# Patient Record
Sex: FEMALE | Race: WHITE | NOT HISPANIC OR LATINO | Employment: OTHER | URBAN - METROPOLITAN AREA
[De-identification: names, ages, dates, MRNs, and addresses within clinical notes are randomized per-mention and may not be internally consistent; named-entity substitution may affect disease eponyms.]

---

## 2017-07-07 NOTE — PROGRESS NOTES
SUBJECTIVE:                                                    Ciera Casas is a 67 year old female who presents to clinic today for the following health issues:      Musculoskeletal problem/pain      Duration: 3 weeks     Description  Location: right foot 2nd toe    Intensity:  Mild-moderate    Accompanying signs and symptoms: swelling and discoloration of toe    History  Previous similar problem: no   Previous evaluation:  none    Precipitating or alleviating factors:  Trauma or overuse: YES- had a fall  Aggravating factors include: walking    Therapies tried and outcome: rest/inactivity    Sustained injury to right second toe during fall 3 weeks ago.  Fell when she was chasing after cat and slipped on cat vomit.  Fell backwards and landed on her left elbow and right foot.  She did hit her head, no LOC.      No headache,m nausea, or vision changes after fall.    Pt was able to walk and bear weight after injury.    Left elbow pain has resolved.      immediately developed swelling and pain of right foot.  Did milan taping which actually worsened pain.  Stopped milan taping.  Pain has been improving but still hurts to walk.  No numbness or tingling.    Patient Active Problem List   Diagnosis     CARDIOVASCULAR SCREENING; LDL GOAL LESS THAN 160     Overweight     Abnormal blood sugar     Closed fracture of middle or proximal phalanx or phalanges of hand     Stiffness of joint, not elsewhere classified, hand     Pain in joint, hand     Post-proc states NEC     Depression with anxiety     Atherosclerotic plaque     Chronic right shoulder pain     Past Surgical History:   Procedure Laterality Date     APPENDECTOMY       BACK SURGERY       C NONSPECIFIC PROCEDURE  1992    laminectomy     CLOSED REDUCTION, PERCUTANEOUS PINNING FINGER, COMBINED  4/6/2012    Procedure:COMBINED CLOSED REDUCTION, PERCUTANEOUS PINNING FINGER; Right Ring Finger Proximal Fracture Closed Reduction Internal Fixation, percutaneous  pinning  Choice anesthesia; Surgeon:MILDRED DICKSON; Location:[facility]       Social History   Substance Use Topics     Smoking status: Former Smoker     Packs/day: 0.50     Years: 30.00     Types: Cigarettes     Quit date: 2/1/2009     Smokeless tobacco: Never Used     Alcohol use Yes      Comment: a couple of drinks on the weekend     Family History   Problem Relation Age of Onset     CANCER Mother      lung     Respiratory Mother      Emphysema     CANCER Father      esophagus     OSTEOPOROSIS Paternal Grandmother      Lipids Paternal Grandmother      Hypertension Maternal Aunt      Alzheimer Disease Other      fathers mother sister     Connective Tissue Disorder Other      lupus cousin     Depression Maternal Aunt      Respiratory Paternal Aunt      Asthma     Respiratory Other      neice     Respiratory Other      Asthma second cousin         Current Outpatient Prescriptions   Medication Sig Dispense Refill     fish oil-omega-3 fatty acids 1000 MG capsule Take 2 g by mouth daily       Cholecalciferol (VITAMIN D3 PO) Take by mouth daily       atorvastatin (LIPITOR) 40 MG tablet Take 1 tablet (40 mg) by mouth daily 90 tablet 1     aspirin 81 MG tablet Take 81 mg by mouth daily         ROS:  MSK, Neuro as above, otherwise negative       OBJECTIVE:                                                    /72 (BP Location: Right arm, Patient Position: Chair, Cuff Size: Adult Regular)  Pulse 78  Temp 97.8  F (36.6  C) (Tympanic)  Resp 16  Wt 180 lb (81.6 kg)  SpO2 98%  BMI 29.95 kg/m2   GENERAL APPEARANCE: healthy, alert and no distress  EYES: Eyes grossly normal to inspection and conjunctivae and sclerae normal  RESP: respirations nonlabored   ORTHO: RIGHT Foot Exam: Inspection:  swelling to 2nd digit  Tender::right 2nd MTP joint, 2nd metatarsal head  Non-tender: 2nd metatarsal, proximal, middle, or distal phalanx   Range of Motion: limited flexion of 2nd digit     PSYCH: mentation appears normal and affect  normal/bright     Xray of right foot with fracture of middle phalanx, does not extend into joint line.       ASSESSMENT/PLAN:                                                    (S92.965A) Closed fracture of phalanx of right second toe, initial encounter  (primary encounter diagnosis)  Plan: XR Foot Right G/E 3 Views, PODIATRY/FOOT &         ANKLE SURGERY REFERRAL        Placed in ortho boot with relief of pain with weight bearing.  Advised on ice and NSAID use.  Follow up with podiatry if not improving in 1 week.    (R59.321) Pain of toe of right foot  Plan: as above         See Patient Instructions    Marisela Khan, [facility]    Patient Instructions   1.  For toe fracture wear immobilizing boot when weight bearing.  Apply ice ove rthe toe 3 times a day for 15 min.  Over the counter ibuprofen as needed for pain/swelling.  Follow up with podiatry if not improving in 1 week.

## 2017-07-10 ENCOUNTER — OFFICE VISIT (OUTPATIENT)
Dept: FAMILY MEDICINE | Facility: CLINIC | Age: 67
End: 2017-07-10
Payer: COMMERCIAL

## 2017-07-10 ENCOUNTER — RADIANT APPOINTMENT (OUTPATIENT)
Dept: GENERAL RADIOLOGY | Facility: CLINIC | Age: 67
End: 2017-07-10
Attending: NURSE PRACTITIONER
Payer: COMMERCIAL

## 2017-07-10 VITALS
RESPIRATION RATE: 16 BRPM | OXYGEN SATURATION: 98 % | HEART RATE: 78 BPM | WEIGHT: 180 LBS | SYSTOLIC BLOOD PRESSURE: 130 MMHG | TEMPERATURE: 97.8 F | DIASTOLIC BLOOD PRESSURE: 72 MMHG | BODY MASS INDEX: 29.95 KG/M2

## 2017-07-10 DIAGNOSIS — M79.674 PAIN OF TOE OF RIGHT FOOT: ICD-10-CM

## 2017-07-10 DIAGNOSIS — S92.501A CLOSED FRACTURE OF PHALANX OF RIGHT SECOND TOE, INITIAL ENCOUNTER: Primary | ICD-10-CM

## 2017-07-10 PROCEDURE — 99213 OFFICE O/P EST LOW 20 MIN: CPT | Performed by: NURSE PRACTITIONER

## 2017-07-10 PROCEDURE — 73630 X-RAY EXAM OF FOOT: CPT | Mod: RT

## 2017-07-10 RX ORDER — CHLORAL HYDRATE 500 MG
2 CAPSULE ORAL DAILY PRN
COMMUNITY

## 2017-07-10 NOTE — LETTER
My Depression Action Plan  Name: Ciera Casas   Date of Birth 1950  Date: 7/10/2017    My doctor: Sowmya Tinajero   My clinic: [address] [address] [phone]          GREEN    ZONE   Good Control    What it looks like:     Things are going generally well. You have normal up s and down s. You may even feel depressed from time to time, but bad moods usually last less than a day.   What you need to do:  1. Continue to care for yourself (see self care plan)  2. Check your depression survival kit and update it as needed  3. Follow your physician s recommendations including any medication.  4. Do not stop taking medication unless you consult with your physician first.           YELLOW         ZONE Getting Worse    What it looks like:     Depression is starting to interfere with your life.     It may be hard to get out of bed; you may be starting to isolate yourself from others.    Symptoms of depression are starting to last most all day and this has happened for several days.     You may have suicidal thoughts but they are not constant.   What you need to do:     1. Call your care team, your response to treatment will improve if you keep your care team informed of your progress. Yellow periods are signs an adjustment may need to be made.     2. Continue your self-care, even if you have to fake it!    3. Talk to someone in your support network    4. Open up your depression survival kit           RED    ZONE Medical Alert - Get Help    What it looks like:     Depression is seriously interfering with your life.     You may experience these or other symptoms: You can t get out of bed most days, can t work or engage in other necessary activities, you have trouble taking care of basic hygiene, or basic responsibilities, thoughts of suicide or death that will not go away, self-injurious behavior.     What you need to do:  1. Call your care  team and request a same-day appointment. If they are not available (weekends or after hours) call your local crisis line, emergency room or 911.      Electronically signed by: Keyla Vega, July 10, 2017    Depression Self Care Plan / Survival Kit    Self-Care for Depression  Here s the deal. Your body and mind are really not as separate as most people think.  What you do and think affects how you feel and how you feel influences what you do and think. This means if you do things that people who feel good do, it will help you feel better.  Sometimes this is all it takes.  There is also a place for medication and therapy depending on how severe your depression is, so be sure to consult with your medical provider and/ or Behavioral Health Consultant if your symptoms are worsening or not improving.     In order to better manage my stress, I will:    Exercise  Get some form of exercise, every day. This will help reduce pain and release endorphins, the  feel good  chemicals in your brain. This is almost as good as taking antidepressants!  This is not the same as joining a gym and then never going! (they count on that by the way ) It can be as simple as just going for a walk or doing some gardening, anything that will get you moving.      Hygiene   Maintain good hygiene (Get out of bed in the morning, Make your bed, Brush your teeth, Take a shower, and Get dressed like you were going to work, even if you are unemployed).  If your clothes don't fit try to get ones that do.    Diet  I will strive to eat foods that are good for me, drink plenty of water, and avoid excessive sugar, caffeine, alcohol, and other mood-altering substances.  Some foods that are helpful in depression are: complex carbohydrates, B vitamins, flaxseed, fish or fish oil, fresh fruits and vegetables.    Psychotherapy  I agree to participate in Individual Therapy (if recommended).    Medication  If prescribed medications, I agree to take them.  Missing  doses can result in serious side effects.  I understand that drinking alcohol, or other illicit drug use, may cause potential side effects.  I will not stop my medication abruptly without first discussing it with my provider.    Staying Connected With Others  I will stay in touch with my friends, family members, and my primary care provider/team.    Use your imagination  Be creative.  We all have a creative side; it doesn t matter if it s oil painting, sand castles, or mud pies! This will also kick up the endorphins.    Witness Beauty  (AKA stop and smell the roses) Take a look outside, even in mid-winter. Notice colors, textures. Watch the squirrels and birds.     Service to others  Be of service to others.  There is always someone else in need.  By helping others we can  get out of ourselves  and remember the really important things.  This also provides opportunities for practicing all the other parts of the program.    Humor  Laugh and be silly!  Adjust your TV habits for less news and crime-drama and more comedy.    Control your stress  Try breathing deep, massage therapy, biofeedback, and meditation. Find time to relax each day.     My support system    Clinic Contact:  Phone number:    Contact 1:  Phone number:    Contact 2:  Phone number:    Baptism/:  Phone number:    Therapist:  Phone number:    Local crisis center:    Phone number:    Other community support:  Phone number:

## 2017-07-10 NOTE — PATIENT INSTRUCTIONS
1.  For toe fracture wear immobilizing boot when weight bearing.  Apply ice ove rthe toe 3 times a day for 15 min.  Over the counter ibuprofen as needed for pain/swelling.  Follow up with podiatry if not improving in 1 week.

## 2017-07-10 NOTE — NURSING NOTE
"Chief Complaint   Patient presents with     Musculoskeletal Problem       Initial /72 (BP Location: Right arm, Patient Position: Chair, Cuff Size: Adult Regular)  Pulse 78  Temp 97.8  F (36.6  C) (Tympanic)  Resp 16  Wt 180 lb (81.6 kg)  SpO2 98%  BMI 29.95 kg/m2 Estimated body mass index is 29.95 kg/(m^2) as calculated from the following:    Height as of 12/12/16: 5' 5\" (1.651 m).    Weight as of this encounter: 180 lb (81.6 kg).  Medication Reconciliation: complete     Keyla Vega, CMA    "

## 2017-07-10 NOTE — MR AVS SNAPSHOT
After Visit Summary   7/10/2017    Ciera Casas    MRN: [id number]           Patient Information     Date Of Birth          1950        Visit Information        Provider Department      7/10/2017 9:20 AM Marisela Khan APRN CNP [facility]        Today's Diagnoses     Pain of toe of right foot    -  1      Care Instructions    1.  For toe fracture wear immobilizing boot when weight bearing.  Apply ice ove rthe toe 3 times a day for 15 min.  Over the counter ibuprofen as needed for pain/swelling.  Follow up with podiatry if not improving in 1 week.          Follow-ups after your visit        Additional Services     PODIATRY/FOOT & ANKLE SURGERY REFERRAL       Your provider has referred you to: FMG: [facility] [phone]   [email or web address]  FMG: [facility] [phone]   [email or web address]    Please be aware that coverage of these services is subject to the terms and limitations of your health insurance plan.  Call member services at your health plan with any benefit or coverage questions.      Please bring the following to your appointment:  >>   Any x-rays, CTs or MRIs which have been performed.  Contact the facility where they were done to arrange for  prior to your scheduled appointment.    >>   List of current medications   >>   This referral request   >>   Any documents/labs given to you for this referral                  Who to contact     If you have questions or need follow up information about today's clinic visit or your schedule please contact [facility] directly at [phone].  Normal or non-critical lab and imaging results will be communicated to you by MyChart, letter or phone within 4 business days after the clinic has received the results. If you do not hear from us within 7 days, please contact the clinic through  "FireLayershart or phone. If you have a critical or abnormal lab result, we will notify you by phone as soon as possible.  Submit refill requests through [email or web address] or call your pharmacy and they will forward the refill request to us. Please allow 3 business days for your refill to be completed.          Additional Information About Your Visit        FireLayershart Information     [email or web address] lets you send messages to your doctor, view your test results, renew your prescriptions, schedule appointments and more. To sign up, go to [email or web address] . Click on \"Log in\" on the left side of the screen, which will take you to the Welcome page. Then click on \"Sign up Now\" on the right side of the page.     You will be asked to enter the access code listed below, as well as some personal information. Please follow the directions to create your username and password.     Your access code is: [email or web address]  Expires: 10/8/2017  9:58 AM     Your access code will  in 90 days. If you need help or a new code, please call your [place] clinic or [phone].        Care EveryWhere ID     This is your Care EveryWhere ID. This could be used by other organizations to access your [place] medical records  [id number]        Your Vitals Were     Pulse Temperature Respirations Pulse Oximetry BMI (Body Mass Index)       78 97.8  F (36.6  C) (Tympanic) 16 98% 29.95 kg/m2        Blood Pressure from Last 3 Encounters:   07/10/17 130/72   16 108/76   16 118/78    Weight from Last 3 Encounters:   07/10/17 180 lb (81.6 kg)   16 180 lb (81.6 kg)   16 183 lb (83 kg)              We Performed the Following     DEPRESSION ACTION PLAN (DAP)     PODIATRY/FOOT & ANKLE SURGERY REFERRAL        Primary Care Provider Office Phone # Fax #    Sowmya Tinajero PA-C [phone]       [facility]       [address] [facility] [address]        Equal Access to Services     CHARLEE TAVARES AH: Hadii " gail Livingston, waalexander germaindeborahha, qacharleneta kacesar binflor, waxairam sen crainflorentinyordan verma josi. So [facility] [phone].    ATENCIÓN: Si habla español, tiene a varner disposición servicios gratuitos de asistencia lingüística. Llame al [phone].    We comply with applicable federal civil rights laws and [place] laws. We do not discriminate on the basis of race, color, national origin, age, disability sex, sexual orientation or gender identity.            Thank you!     Thank you for choosing [facility]  for your care. Our goal is always to provide you with excellent care. Hearing back from our patients is one way we can continue to improve our services. Please take a few minutes to complete the written survey that you may receive in the mail after your visit with us. Thank you!             Your Updated Medication List - Protect others around you: Learn how to safely use, store and throw away your medicines at www.disposemymeds.org.          This list is accurate as of: 7/10/17  9:58 AM.  Always use your most recent med list.                   Brand Name Dispense Instructions for use Diagnosis    aspirin 81 MG tablet      Take 81 mg by mouth daily        atorvastatin 40 MG tablet    LIPITOR    90 tablet    Take 1 tablet (40 mg) by mouth daily    CARDIOVASCULAR SCREENING; LDL GOAL LESS THAN 160       fish oil-omega-3 fatty acids 1000 MG capsule      Take 2 g by mouth daily        VITAMIN D3 PO      Take by mouth daily

## 2017-07-11 ASSESSMENT — PATIENT HEALTH QUESTIONNAIRE - PHQ9: SUM OF ALL RESPONSES TO PHQ QUESTIONS 1-9: 2

## 2017-07-17 ENCOUNTER — OFFICE VISIT (OUTPATIENT)
Dept: PODIATRY | Facility: CLINIC | Age: 67
End: 2017-07-17
Payer: COMMERCIAL

## 2017-07-17 VITALS
HEIGHT: 65 IN | WEIGHT: 175 LBS | BODY MASS INDEX: 29.16 KG/M2 | SYSTOLIC BLOOD PRESSURE: 132 MMHG | DIASTOLIC BLOOD PRESSURE: 60 MMHG

## 2017-07-17 DIAGNOSIS — S92.514A CLOSED NONDISPLACED FRACTURE OF PROXIMAL PHALANX OF LESSER TOE OF RIGHT FOOT, INITIAL ENCOUNTER: Primary | ICD-10-CM

## 2017-07-17 PROCEDURE — 99203 OFFICE O/P NEW LOW 30 MIN: CPT | Performed by: PODIATRIST

## 2017-07-17 NOTE — MR AVS SNAPSHOT
After Visit Summary   7/17/2017    Ciera Casas    MRN: [id number]           Patient Information     Date Of Birth          1950        Visit Information        Provider Department      7/17/2017 1:20 PM Casey Munroe, [facility]        Today's Diagnoses     Closed nondisplaced fracture of proximal phalanx of lesser toe of right foot, initial encounter    -  1      Care Instructions    Follow up in 2 weeks.  Dr. Munroe's Clinic Locations    Monday Tuesday  [facility]  [address]         [address]  [place] [address]      [place] [address]  Ph:  [phone]      Ph: [phone]  Fax: [phone]      Fax: [phone]    Wednesday Thursday - Surgery Day  [facility]     Call Danielle @ [phone]  [address] [place] [address]  Ph: [phone]  Fax: [phone]    PURVIS THERAPY    Many aches and pains throughout the foot and ankle can be helped with many simple treatments. This is usually described as PRICE Therapy.      P - Protection - often times, inflammation/pain in the lower extremity is not able to improve simply because the areas involved are never allowed to rest. Every step we take can bother the problematic area. Protecting those areas is an important step in the healing process. This may involve a walking cast boot, a special insert/orthotic device, an ankle brace, or simply avoiding barefoot walking.    R - Rest - in addition to protecting the foot/ankle, resting is an important, but often times difficult, treatment option. Getting off your feet when they bother you, and specifically avoiding activities that cause pain/discomfort, are very beneficial to prevent, and treat, foot/ankle pain.      I - Ice - icing regularly can help to decrease inflammation and swelling in the foot, thus decreasing pain. Using an ice pack or a bag of  frozen veggies works very well. Ice for 20 minutes multiple times per day as needed.  Do not place the ice directly on the skin as this can cause tissue damage.    C - Compression - using a compression wrap or an ACE wrap can help to decrease swelling, which can help to decrease pain. Wearing the wraps is generally not needed at night, but they should be worn on a regular basis when you are going to be on your feet for prolonged periods as gravity tends to pull fluids down to your feet/ankles.    E - Elevation - elevating your lower extremities multiple times daily for 15-20 minutes can help to decrease swelling, which works well in decreasing pain levels.    NSAID/Tylenol - Anti-inflammatories like Aleve or ibuprofen, and/or a pain medication, such as Tylenol, can help to improve pain levels and get the issue resolved sooner rather than later. Anyone with liver issues should be careful with Tylenol, and anyone with high blood pressure or heart, stomach or kidney issues should be careful with anti-inflammatories. Please ask if you have questions about these medications, including dosage.                Follow-ups after your visit        Who to contact     If you have questions or need follow up information about today's clinic visit or your schedule please contact [facility] directly at [phone].  Normal or non-critical lab and imaging results will be communicated to you by Musiwavehart, letter or phone within 4 business days after the clinic has received the results. If you do not hear from us within 7 days, please contact the clinic through Musiwavehart or phone. If you have a critical or abnormal lab result, we will notify you by phone as soon as possible.  Submit refill requests through [employer] or call your pharmacy and they will forward the refill request to us. Please allow 3 business days for your refill to be completed.          Additional Information About Your Visit        MyChart Information  "    [employer] lets you send messages to your doctor, view your test results, renew your prescriptions, schedule appointments and more. To sign up, go to [email or web address] . Click on \"Log in\" on the left side of the screen, which will take you to the Welcome page. Then click on \"Sign up Now\" on the right side of the page.     You will be asked to enter the access code listed below, as well as some personal information. Please follow the directions to create your username and password.     Your access code is: [email or web address]  Expires: 10/8/2017  9:58 AM     Your access code will  in 90 days. If you need help or a new code, please call your [place] clinic or [phone].        Care EveryWhere ID     This is your [facility] EveryWhere ID. This could be used by other organizations to access your [place] medical records  [id number]        Your Vitals Were     Height BMI (Body Mass Index)                5' 5\" (1.651 m) 29.12 kg/m2           Blood Pressure from Last 3 Encounters:   17 132/60   07/10/17 130/72   16 108/76    Weight from Last 3 Encounters:   17 175 lb (79.4 kg)   07/10/17 180 lb (81.6 kg)   16 180 lb (81.6 kg)              Today, you had the following     No orders found for display       Primary Care Provider Office Phone # Fax #    Sowmya Tinajero PA-C [phone]       [place] [address] [facility] [address]        Equal Access to Services     [facility] CHAITANYA AH: Hadii gail Livingston, wamumtazda luchery, qaybta kaalprosper meehan. So [facility] [phone].    ATENCIÓN: Si habla español, tiene a varner disposición servicios gratuitos de asistencia lingüística. Mil al [phone].    We comply with applicable federal civil rights laws and [place] laws. We do not discriminate on the basis of race, color, national origin, age, disability sex, sexual orientation or gender " identity.            Thank you!     Thank you for choosing [facility]  for your care. Our goal is always to provide you with excellent care. Hearing back from our patients is one way we can continue to improve our services. Please take a few minutes to complete the written survey that you may receive in the mail after your visit with us. Thank you!             Your Updated Medication List - Protect others around you: Learn how to safely use, store and throw away your medicines at www.disposemymeds.org.          This list is accurate as of: 7/17/17  1:23 PM.  Always use your most recent med list.                   Brand Name Dispense Instructions for use Diagnosis    aspirin 81 MG tablet      Take 81 mg by mouth daily        atorvastatin 40 MG tablet    LIPITOR    90 tablet    Take 1 tablet (40 mg) by mouth daily    CARDIOVASCULAR SCREENING; LDL GOAL LESS THAN 160       fish oil-omega-3 fatty acids 1000 MG capsule      Take 2 g by mouth daily        VITAMIN D3 PO      Take by mouth daily
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
## 2017-07-17 NOTE — PATIENT INSTRUCTIONS
Follow up in 2 weeks.  Dr. Munroe's Clinic Locations    Monday Tuesday  [facility]  [address]         [id number]  [place] [address]      [place] [address]  Ph:  [phone]      Ph: [phone]  Fax: [phone]      Fax: [phone]    Wednesday Thursday - Surgery Day  [facility]     Call Danielle @ [phone]  [address] [place] [address] [place] [address]  Ph: [phone]  Fax: [phone]    PRICE THERAPY    Many aches and pains throughout the foot and ankle can be helped with many simple treatments. This is usually described as PRICE Therapy.      P - Protection - often times, inflammation/pain in the lower extremity is not able to improve simply because the areas involved are never allowed to rest. Every step we take can bother the problematic area. Protecting those areas is an important step in the healing process. This may involve a walking cast boot, a special insert/orthotic device, an ankle brace, or simply avoiding barefoot walking.    R - Rest - in addition to protecting the foot/ankle, resting is an important, but often times difficult, treatment option. Getting off your feet when they bother you, and specifically avoiding activities that cause pain/discomfort, are very beneficial to prevent, and treat, foot/ankle pain.      I - Ice - icing regularly can help to decrease inflammation and swelling in the foot, thus decreasing pain. Using an ice pack or a bag of frozen veggies works very well. Ice for 20 minutes multiple times per day as needed.  Do not place the ice directly on the skin as this can cause tissue damage.    C - Compression - using a compression wrap or an ACE wrap can help to decrease swelling, which can help to decrease pain. Wearing the wraps is generally not needed at night, but they should be worn on a regular basis when you are going to be on your feet for prolonged periods  as gravity tends to pull fluids down to your feet/ankles.    E - Elevation - elevating your lower extremities multiple times daily for 15-20 minutes can help to decrease swelling, which works well in decreasing pain levels.    NSAID/Tylenol - Anti-inflammatories like Aleve or ibuprofen, and/or a pain medication, such as Tylenol, can help to improve pain levels and get the issue resolved sooner rather than later. Anyone with liver issues should be careful with Tylenol, and anyone with high blood pressure or heart, stomach or kidney issues should be careful with anti-inflammatories. Please ask if you have questions about these medications, including dosage.

## 2017-07-17 NOTE — NURSING NOTE
"Chief Complaint   Patient presents with     Fracture     R 2nd toe broken- Injured over 3 weeks ago       Initial /60 (BP Location: Left arm, Patient Position: Chair, Cuff Size: Adult Large)  Ht 5' 5\" (1.651 m)  Wt 175 lb (79.4 kg)  BMI 29.12 kg/m2 Estimated body mass index is 29.12 kg/(m^2) as calculated from the following:    Height as of this encounter: 5' 5\" (1.651 m).    Weight as of this encounter: 175 lb (79.4 kg).  Medication Reconciliation: abelino Haro MA July 17, 2017 1:11 PM  "

## 2017-07-31 ENCOUNTER — RADIANT APPOINTMENT (OUTPATIENT)
Dept: GENERAL RADIOLOGY | Facility: CLINIC | Age: 67
End: 2017-07-31
Attending: PODIATRIST
Payer: COMMERCIAL

## 2017-07-31 ENCOUNTER — OFFICE VISIT (OUTPATIENT)
Dept: PODIATRY | Facility: CLINIC | Age: 67
End: 2017-07-31
Payer: COMMERCIAL

## 2017-07-31 VITALS
BODY MASS INDEX: 29.16 KG/M2 | DIASTOLIC BLOOD PRESSURE: 68 MMHG | HEIGHT: 65 IN | WEIGHT: 175 LBS | SYSTOLIC BLOOD PRESSURE: 111 MMHG

## 2017-07-31 DIAGNOSIS — S92.514D CLOSED NONDISPLACED FRACTURE OF PROXIMAL PHALANX OF LESSER TOE OF RIGHT FOOT WITH ROUTINE HEALING, SUBSEQUENT ENCOUNTER: Primary | ICD-10-CM

## 2017-07-31 PROCEDURE — 73660 X-RAY EXAM OF TOE(S): CPT | Mod: RT

## 2017-07-31 PROCEDURE — 99213 OFFICE O/P EST LOW 20 MIN: CPT | Performed by: PODIATRIST

## 2017-07-31 NOTE — PROGRESS NOTES
"PATIENT HISTORY:  Ciera Casas is a 67 year old female who presents to clinic for recheck of a R 2nd toe fx.  DOI about 5 wks ago.  Pt is wbat in a boot.  Reports minimal pain, but occasional toe and 2nd MPJ pain (plantar).  Reports painting w/o boot since last visit which made pain worse.  Denies fevers, new injury.  Nonsmoker.  Retired.       EXAM:  /68 (BP Location: Left arm, Patient Position: Chair, Cuff Size: Adult Regular)  Ht 5' 5\" (1.651 m)  Wt 175 lb (79.4 kg)  BMI 29.12 kg/m2    General appearance: Patient is alert and fully cooperative with history & exam.  No sign of distress is noted during the visit.     Dermatologic: Skin is intact to the right foot without significant lesions, rash or abrasion.  No paronychia or evidence of soft tissue infection is noted.      Vascular: DP & PT pulses are intact & regular on the right.  No significant edema or varicosities noted.  CFT and skin temperature are normal.      Neurologic: Lower extremity sensation is intact to light touch.  No evidence of weakness or contracture in the lower extremities.  No evidence of neuropathy.      Musculoskeletal: Right 2nd toe mild edema, mild pain to palpation.  Pain sub 2nd MPJ noted today as well.  Patient is ambulatory without assistive device or brace.  No gross ankle deformity noted.  No foot or ankle joint effusion is noted.     XRs of right 2nd toe reviewed with pt.  Relatively nondisplaced fx of proximal phalanx of 2nd toe with callus.  Some mild angular deformity at 2nd met head, ? old fx, DJD.      ASSESSMENT: Right 2nd toe fracture, ? capsulitis/DJD      PLAN:  Reviewed patient's chart in epic.  Discussed condition and treatment options including pros and cons.     Continue WBAT in Aircast boot (short).  RICE.   F/u in 4 wks.     Casey Munroe, ROMAINE, FACFAS        "

## 2017-07-31 NOTE — NURSING NOTE
"Chief Complaint   Patient presents with     Fracture     2 week follow up R 2nd toe Fx       Initial /68 (BP Location: Left arm, Patient Position: Chair, Cuff Size: Adult Regular)  Ht 5' 5\" (1.651 m)  Wt 175 lb (79.4 kg)  BMI 29.12 kg/m2 Estimated body mass index is 29.12 kg/(m^2) as calculated from the following:    Height as of this encounter: 5' 5\" (1.651 m).    Weight as of this encounter: 175 lb (79.4 kg).  Medication Reconciliation: abelino Haro MA July 31, 2017 1:18 PM  "

## 2017-07-31 NOTE — MR AVS SNAPSHOT
After Visit Summary   7/31/2017    Ciera Casas    MRN: [id number]           Patient Information     Date Of Birth          1950        Visit Information        Provider Department      7/31/2017 1:00 PM Casey Munroe DPM [facility]        Today's Diagnoses     Closed nondisplaced fracture of proximal phalanx of lesser toe of right foot with routine healing, subsequent encounter    -  1      Care Instructions    Follow up in 3 weeks.  Dr. Munroe's Clinic Locations    Monday Tuesday  [facility]  [address] [place] [address]         [address] [place] [address]      [place] [address]  Ph:  [phone]      Ph: [phone]  Fax: [phone]      Fax: [phone]    Wednesday Thursday - Surgery Day  [facility]     Call Danielle @ [phone]  [address] [place] [address]  Ph: [phone]  Fax: [phone]                  Follow-ups after your visit        Your next 10 appointments already scheduled     Aug 21, 2017  1:00 PM CDT   Return Visit with Casey Munroe DPM   [facility]    [address] [place] [id number] [phone]              Who to contact     If you have questions or need follow up information about today's clinic visit or your schedule please contact [facility] directly at [phone].  Normal or non-critical lab and imaging results will be communicated to you by MyChart, letter or phone within 4 business days after the clinic has received the results. If you do not hear from us within 7 days, please contact the clinic through MyChart or phone. If you have a critical or abnormal lab result, we will notify you by phone as soon as possible.  Submit refill requests through [employer] or call your pharmacy and they will forward the refill request to us. Please allow  "3 business days for your refill to be completed.          Additional Information About Your Visit        MyChart Information     "[employer] lets you send messages to your doctor, view your test results, renew your prescriptions, schedule appointments and more. To sign up, go to [email or web address] . Click on \"Log in\" on the left side of the screen, which will take you to the Welcome page. Then click on \"Sign up Now\" on the right side of the page.     You will be asked to enter the access code listed below, as well as some personal information. Please follow the directions to create your username and password.     Your access code is: [email or web address]  Expires: 10/8/2017  9:58 AM     Your access code will  in 90 days. If you need help or a new code, please call your [place] clinic or [phone].        Care EveryWhere ID     This is your [facility] EveryWhere ID. This could be used by other organizations to access your [place] medical records  [id number]        Your Vitals Were     Height BMI (Body Mass Index)                5' 5\" (1.651 m) 29.12 kg/m2           Blood Pressure from Last 3 Encounters:   17 111/68   17 132/60   07/10/17 130/72    Weight from Last 3 Encounters:   17 175 lb (79.4 kg)   17 175 lb (79.4 kg)   07/10/17 180 lb (81.6 kg)              We Performed the Following     XR Toe Right G/E 2 Views        Primary Care Provider Office Phone # Fax #    Sowmya Tinajero PA-C [phone]       [place] [id number] [facility] [address]        Equal Access to Services     SRIDHAR TAVARES : Hadjacinto [place], lore mg, gigi kaalmada gemini, prosper prater. So [facility] [phone].    ATENCIÓN: Si habla español, tiene a varner disposición servicios gratuitos de asistencia lingüística. Llame al [phone].    We comply with applicable federal civil rights laws and [place] laws. " We do not discriminate on the basis of race, color, national origin, age, disability sex, sexual orientation or gender identity.            Thank you!     Thank you for choosing [facility]  for your care. Our goal is always to provide you with excellent care. Hearing back from our patients is one way we can continue to improve our services. Please take a few minutes to complete the written survey that you may receive in the mail after your visit with us. Thank you!             Your Updated Medication List - Protect others around you: Learn how to safely use, store and throw away your medicines at www.disposemymeds.org.          This list is accurate as of: 7/31/17  1:33 PM.  Always use your most recent med list.                   Brand Name Dispense Instructions for use Diagnosis    aspirin 81 MG tablet      Take 81 mg by mouth daily        atorvastatin 40 MG tablet    LIPITOR    90 tablet    Take 1 tablet (40 mg) by mouth daily    CARDIOVASCULAR SCREENING; LDL GOAL LESS THAN 160       fish oil-omega-3 fatty acids 1000 MG capsule      Take 2 g by mouth daily        VITAMIN D3 PO      Take by mouth daily

## 2017-07-31 NOTE — LETTER
"    7/31/2017         RE: Ciera Casas  [address]  [facility] [place] [address]        Dear Colleague,    Thank you for referring your patient, Ciera Casas, to the [facility]. Please see a copy of my visit note below.    PATIENT HISTORY:  Ciera Casas is a 67 year old female who presents to clinic for recheck of a R 2nd toe fx.  DOI about 5 wks ago.  Pt is wbat in a boot.  Reports minimal pain, but occasional toe and 2nd MPJ pain (plantar).  Reports painting w/o boot since last visit which made pain worse.  Denies fevers, new injury.  Nonsmoker.  Retired.       EXAM:  /68 (BP Location: Left arm, Patient Position: Chair, Cuff Size: Adult Regular)  Ht 5' 5\" (1.651 m)  Wt 175 lb (79.4 kg)  BMI 29.12 kg/m2    General appearance: Patient is alert and fully cooperative with history & exam.  No sign of distress is noted during the visit.     Dermatologic: Skin is intact to the right foot without significant lesions, rash or abrasion.  No paronychia or evidence of soft tissue infection is noted.      Vascular: DP & PT pulses are intact & regular on the right.  No significant edema or varicosities noted.  CFT and skin temperature are normal.      Neurologic: Lower extremity sensation is intact to light touch.  No evidence of weakness or contracture in the lower extremities.  No evidence of neuropathy.      Musculoskeletal: Right 2nd toe mild edema, mild pain to palpation.   Pain sub 2nd MPJ noted today as well.  Patient is ambulatory without assistive device or brace.  No gross ankle deformity noted.  No foot or ankle joint effusion is noted.     XRs of right 2nd toe reviewed with pt.  Relatively nondisplaced fx of proximal phalanx of 2nd toe with callus.  Some mild angular deformity at 2nd met head, ? old fx, DJD.      ASSESSMENT: Right 2nd toe fracture, ? capsulitis/DJD      PLAN:  Reviewed patient's chart in epic.  Discussed condition and treatment options including pros and " cons.     Continue WBAT in Aircast boot (short).  RICE.   F/u in 4 wks.     Casey Munroe DPM, FACFAS          BMI is normal.  JUAN JOSE Haro MA July 31, 2017 1:18 PM      Again, thank you for allowing me to participate in the care of your patient.        Sincerely,        Casey Munroe DPM

## 2017-07-31 NOTE — PATIENT INSTRUCTIONS
Follow up in 3 weeks.  Dr[facility]roe[facility] Locations    Monday Tuesday  [facility]  [address]         [id number]  [place] [address]      [place] [address]  Ph:  [phone]      Ph: [phone]  Fax: [phone]      Fax: [phone]    Wednesday Thursday - Surgery Day  [facility]     Call Danielle @ [phone]  [address] [place] [address] [place] [address]  Ph: [phone]  Fax: [phone]

## 2017-08-14 DIAGNOSIS — Z13.6 CARDIOVASCULAR SCREENING; LDL GOAL LESS THAN 160: ICD-10-CM

## 2017-08-14 RX ORDER — ATORVASTATIN CALCIUM 40 MG/1
TABLET, FILM COATED ORAL
Qty: 90 TABLET | Refills: 0 | Status: SHIPPED | OUTPATIENT
Start: 2017-08-14 | End: 2017-11-22

## 2017-08-14 NOTE — TELEPHONE ENCOUNTER
Prescription approved per [facility] Refill Protocol.    Signed Prescriptions:                        Disp   Refills    atorvastatin (LIPITOR) 40 MG tablet        90 tab*0        Sig: TAKE ONE TABLET BY MOUTH EVERY DAY  Authorizing Provider: LISA IBARRA  Ordering User: EMANI CARRIZALES      Closing encounter - no further actions needed at this time    Emani Carrizales RN

## 2017-08-14 NOTE — TELEPHONE ENCOUNTER
atorvastatin (LIPITOR) 40 MG tablet     Last Written Prescription Date: 12/12/16  Last Fill Quantity: 90, # refills: 1  Last Office Visit with [facility], [facility] or [facility] prescribing provider: 7/10/17  Next 5 appointments (look out 90 days)     Aug 21, 2017  1:00 PM CDT   Return Visit with Casey Munroe DPM   [facility] ([facility])    [address] [place] [address]   [phone]                   Lab Results   Component Value Date    CHOL 147 12/13/2016     Lab Results   Component Value Date    HDL 80 12/13/2016     Lab Results   Component Value Date    LDL 56 12/13/2016     Lab Results   Component Value Date    TRIG 53 12/13/2016     Lab Results   Component Value Date    CHOLHDLRATIO 1.9 10/15/2015

## 2017-08-21 ENCOUNTER — OFFICE VISIT (OUTPATIENT)
Dept: PODIATRY | Facility: CLINIC | Age: 67
End: 2017-08-21
Payer: COMMERCIAL

## 2017-08-21 ENCOUNTER — RADIANT APPOINTMENT (OUTPATIENT)
Dept: GENERAL RADIOLOGY | Facility: CLINIC | Age: 67
End: 2017-08-21
Attending: PODIATRIST
Payer: COMMERCIAL

## 2017-08-21 VITALS
SYSTOLIC BLOOD PRESSURE: 110 MMHG | DIASTOLIC BLOOD PRESSURE: 60 MMHG | BODY MASS INDEX: 29.16 KG/M2 | WEIGHT: 175 LBS | HEIGHT: 65 IN

## 2017-08-21 DIAGNOSIS — S92.514D CLOSED NONDISPLACED FRACTURE OF PROXIMAL PHALANX OF LESSER TOE OF RIGHT FOOT WITH ROUTINE HEALING, SUBSEQUENT ENCOUNTER: Primary | ICD-10-CM

## 2017-08-21 PROCEDURE — 73660 X-RAY EXAM OF TOE(S): CPT | Mod: RT

## 2017-08-21 PROCEDURE — 99213 OFFICE O/P EST LOW 20 MIN: CPT | Performed by: PODIATRIST

## 2017-08-21 NOTE — PROGRESS NOTES
"PATIENT HISTORY:  Ciera Casas is a 67 year old female who presents to clinic for recheck of a R 2nd toe fx.  Pt is wbat in a boot.  Reports minimal pain, but pt did have a fall down some stairs 3 days ago and landed flat on her foot, which is a bit sore, but she can still bear weight.  Denies fevers.  Nonsmoker.  Retired.       EXAM:  /60 (BP Location: Left arm, Patient Position: Chair, Cuff Size: Adult Large)  Ht 5' 5\" (1.651 m)  Wt 175 lb (79.4 kg)  BMI 29.12 kg/m2    General appearance: Patient is alert and fully cooperative with history & exam.  No sign of distress is noted during the visit.     Dermatologic: Diffuse callus sub left 2nd MPJ.  Skin is intact to the right foot without significant lesions, rash or abrasion.  No paronychia or evidence of soft tissue infection is noted.      Vascular: DP & PT pulses are intact & regular on the right.  No significant edema or varicosities noted.  CFT and skin temperature are normal.      Neurologic: Lower extremity sensation is intact to light touch.  No evidence of weakness or contracture in the lower extremities.  No evidence of neuropathy.      Musculoskeletal: Right 2nd toe w/o pain to palpation.  Mild pain sub 2nd MPJ noted today as well.  No other significant foot pain on exam.  No gross ankle deformity noted.  No foot or ankle joint effusion is noted.     XRs of right 2nd toe reviewed with pt.  Relatively nondisplaced fx of proximal phalanx of 2nd toe with callus.  Some mild angular deformity at 2nd met head, ? old fx, DJD.      ASSESSMENT: Right 2nd toe fracture, ? capsulitis/DJD      PLAN:  Reviewed patient's chart in epic.  Discussed condition and treatment options including pros and cons.     Fracture seems healed clinically.  Advised transitioning to stiff soled supportive shoes with orthotics w/metatarsal pads as tolerated.   Pt states she has orthotics at home.  PRICE as needed.  F/u prn.     Casey Munroe, ROMAINE, FACFAS        "

## 2017-08-21 NOTE — LETTER
"    8/21/2017         RE: Ciera Casas  [address]  [facility] [address]        Dear Colleague,    Thank you for referring your patient, Ciera Casas, to the [facility]. Please see a copy of my visit note below.    BMI is normal.  JUAN JOSE Haro MA August 21, 2017 12:53 PM      PATIENT HISTORY:  Ciera Casas is a 67 year old female who presents to clinic for recheck of a R 2nd toe fx.  Pt is wbat in a boot.  Reports minimal pain, but pt did have a fall down some stairs 3 days ago and landed flat on her foot, which is a bit sore, but she can still bear weight.  Denies fevers.  Nonsmoker.  Retired.       EXAM:  /60 (BP Location: Left arm, Patient Position: Chair, Cuff Size: Adult Large)  Ht 5' 5\" (1.651 m)  Wt 175 lb (79.4 kg)  BMI 29.12 kg/m2    General appearance: Patient is alert and fully cooperative with history & exam.  No sign of distress is noted during the visit.     Dermatologic: Diffuse callus sub left 2nd MPJ.  Skin is intact to the right foot without significant lesions, rash or abrasion.  No paronychia or evidence of soft tissue infection is noted.      Vascular: DP & PT pulses are intact & regular on the right.  No significant edema or varicosities noted.  CFT and skin temperature are normal.      Neurologic: Lower extremity sensation is intact to light touch.  No evidence of weakness or contracture in the lower extremities.  No evidence of neuropathy.      Musculoskeletal: Right 2nd toe w/o pain to palpation.   Mild pain sub 2nd MPJ noted today as well.  No other significant foot pain on exam.  No gross ankle deformity noted.  No foot or ankle joint effusion is noted.     XRs of right 2nd toe reviewed with pt.  Relatively nondisplaced fx of proximal phalanx of 2nd toe with callus.  Some mild angular deformity at 2nd met head, ? old fx, DJD.      ASSESSMENT: Right 2nd toe fracture, ? capsulitis/DJD      PLAN:  Reviewed patient's chart in [facility].  Discussed " condition and treatment options including pros and cons.     Fracture seems healed clinically.  Advised transitioning to stiff soled supportive shoes with orthotics w/metatarsal pads as tolerated.   Pt states she has orthotics at home.  PRICE as needed.  F/u prn.     Casey Munroe DPM, FACFAS          Again, thank you for allowing me to participate in the care of your patient.        Sincerely,        Casey Munroe DPM

## 2017-08-21 NOTE — MR AVS SNAPSHOT
After Visit Summary   8/21/2017    Ciera Casas    MRN: [id number]           Patient Information     Date Of Birth          1950        Visit Information        Provider Department      8/21/2017 1:00 PM Casey Munroe DPM [facility]        Today's Diagnoses     Closed nondisplaced fracture of proximal phalanx of lesser toe of right foot with routine healing, subsequent encounter    -  1      Care Instructions    PRICE THERAPY    Many aches and pains throughout the foot and ankle can be helped with many simple treatments. This is usually described as PRICE Therapy.      P - Protection - often times, inflammation/pain in the lower extremity is not able to improve simply because the areas involved are never allowed to rest. Every step we take can bother the problematic area. Protecting those areas is an important step in the healing process. This may involve a walking cast boot, a special insert/orthotic device, an ankle brace, or simply avoiding barefoot walking.    R - Rest - in addition to protecting the foot/ankle, resting is an important, but often times difficult, treatment option. Getting off your feet when they bother you, and specifically avoiding activities that cause pain/discomfort, are very beneficial to prevent, and treat, foot/ankle pain.      I - Ice - icing regularly can help to decrease inflammation and swelling in the foot, thus decreasing pain. Using an ice pack or a bag of frozen veggies works very well. Ice for 20 minutes multiple times per day as needed.  Do not place the ice directly on the skin as this can cause tissue damage.    C - Compression - using a compression wrap or an ACE wrap can help to decrease swelling, which can help to decrease pain. Wearing the wraps is generally not needed at night, but they should be worn on a regular basis when you are going to be on your feet for prolonged periods as gravity tends to pull fluids down to your  "feet/ankles.    E - Elevation - elevating your lower extremities multiple times daily for 15-20 minutes can help to decrease swelling, which works well in decreasing pain levels.    NSAID/Tylenol - Anti-inflammatories like Aleve or ibuprofen, and/or a pain medication, such as Tylenol, can help to improve pain levels and get the issue resolved sooner rather than later. Anyone with liver issues should be careful with Tylenol, and anyone with high blood pressure or heart, stomach or kidney issues should be careful with anti-inflammatories. Please ask if you have questions about these medications, including dosage.                Follow-ups after your visit        Who to contact     If you have questions or need follow up information about today's clinic visit or your schedule please contact [facility] directly at [phone].  Normal or non-critical lab and imaging results will be communicated to you by Intellocorphart, letter or phone within 4 business days after the clinic has received the results. If you do not hear from us within 7 days, please contact the clinic through Intellocorphart or phone. If you have a critical or abnormal lab result, we will notify you by phone as soon as possible.  Submit refill requests through Placeling or call your pharmacy and they will forward the refill request to us. Please allow 3 business days for your refill to be completed.          Additional Information About Your Visit        Placeling Information     Placeling lets you send messages to your doctor, view your test results, renew your prescriptions, schedule appointments and more. To sign up, go to [email or web address] . Click on \"Log in\" on the left side of the screen, which will take you to the Welcome page. Then click on \"Sign up Now\" on the right side of the page.     You will be asked to enter the access code listed below, as well as some personal information. Please follow the directions to create your username and " "password.     Your access code is: [email or web address]  Expires: 10/8/2017  9:58 AM     Your access code will  in 90 days. If you need help or a new code, please call your [facility] clinic or [phone].        Care EveryWhere ID     This is your Care EveryWhere ID. This could be used by other organizations to access your [facility] medical records  [id number]        Your Vitals Were     Height BMI (Body Mass Index)                5' 5\" (1.651 m) 29.12 kg/m2           Blood Pressure from Last 3 Encounters:   17 110/60   17 111/68   17 132/60    Weight from Last 3 Encounters:   17 175 lb (79.4 kg)   17 175 lb (79.4 kg)   17 175 lb (79.4 kg)              We Performed the Following     XR Toe Right G/E 2 Views        Primary Care Provider Office Phone # Fax #    Sowmya Tinajero PA-C [phone]       [address] [place] [address]        Equal Access to Services     [facility]: Hadii aad ku hadasho Soomaali, waaxda luqadaha, qaybta kaalmada adeegyada, prosper vergarain hayaan adedevin verma . So [facility] [phone].    ATENCIÓN: Si habla español, tiene a varner disposición servicios gratuitos de asistencia lingüística. [facility] [phone].    We comply with applicable federal civil rights laws and [place] laws. We do not discriminate on the basis of race, color, national origin, age, disability sex, sexual orientation or gender identity.            Thank you!     Thank you for choosing [facility]  for your care. Our goal is always to provide you with excellent care. Hearing back from our patients is one way we can continue to improve our services. Please take a few minutes to complete the written survey that you may receive in the mail after your visit with us. Thank you!             Your Updated Medication List - Protect others around you: Learn how to safely use, store and throw away your medicines at " www.disposemymeds.org.          This list is accurate as of: 8/21/17  1:16 PM.  Always use your most recent med list.                   Brand Name Dispense Instructions for use Diagnosis    aspirin 81 MG tablet      Take 81 mg by mouth daily        atorvastatin 40 MG tablet    LIPITOR    90 tablet    TAKE ONE TABLET BY MOUTH EVERY DAY    CARDIOVASCULAR SCREENING; LDL GOAL LESS THAN 160       fish oil-omega-3 fatty acids 1000 MG capsule      Take 2 g by mouth daily        VITAMIN D3 PO      Take by mouth daily

## 2017-08-21 NOTE — NURSING NOTE
"Chief Complaint   Patient presents with     Fracture     3 week follow up R 2nd toe Fx       Initial /60 (BP Location: Left arm, Patient Position: Chair, Cuff Size: Adult Large)  Ht 5' 5\" (1.651 m)  Wt 175 lb (79.4 kg)  BMI 29.12 kg/m2 Estimated body mass index is 29.12 kg/(m^2) as calculated from the following:    Height as of this encounter: 5' 5\" (1.651 m).    Weight as of this encounter: 175 lb (79.4 kg).  Medication Reconciliation: abelino Haro MA August 21, 2017 12:52 PM  "

## 2017-08-30 ENCOUNTER — TELEPHONE (OUTPATIENT)
Dept: FAMILY MEDICINE | Facility: CLINIC | Age: 67
End: 2017-08-30

## 2017-11-06 ENCOUNTER — RADIANT APPOINTMENT (OUTPATIENT)
Dept: MAMMOGRAPHY | Facility: CLINIC | Age: 67
End: 2017-11-06
Attending: PHYSICIAN ASSISTANT
Payer: MEDICARE

## 2017-11-06 DIAGNOSIS — Z00.00 PREVENTATIVE HEALTH CARE: ICD-10-CM

## 2017-11-06 PROCEDURE — G0202 SCR MAMMO BI INCL CAD: HCPCS

## 2017-11-22 DIAGNOSIS — Z13.6 CARDIOVASCULAR SCREENING; LDL GOAL LESS THAN 160: ICD-10-CM

## 2017-11-24 NOTE — TELEPHONE ENCOUNTER
Medication Detail      Disp Refills Start End SHIRA   atorvastatin (LIPITOR) 40 MG tablet 90 tablet 0 8/14/2017  No   Sig: TAKE ONE TABLET BY MOUTH EVERY DAY   Class: E-Prescribe   Order: [id number]   E-Prescribing Status: Receipt confirmed by pharmacy (8/14/2017  3:23 PM CDT)     LOV:7/10/2017

## 2017-11-28 RX ORDER — ATORVASTATIN CALCIUM 40 MG/1
TABLET, FILM COATED ORAL
Qty: 90 TABLET | Refills: 0 | Status: SHIPPED | OUTPATIENT
Start: 2017-11-28 | End: 2018-02-08

## 2017-11-28 NOTE — TELEPHONE ENCOUNTER
One refill only as patient due for annual office visit and lipid panel.    Team coordinators-Please contact patient to schedule annual office visit after 12/12/17.    Thank you!  TIMOTHY Slade, RN          Lab Results   Component Value Date    CHOL 147 12/13/2016     Lab Results   Component Value Date    HDL 80 12/13/2016     Lab Results   Component Value Date    LDL 56 12/13/2016     Lab Results   Component Value Date    TRIG 53 12/13/2016     Lab Results   Component Value Date    CHOLHDLRATIO 1.9 10/15/2015

## 2018-02-08 ENCOUNTER — OFFICE VISIT (OUTPATIENT)
Dept: FAMILY MEDICINE | Facility: CLINIC | Age: 68
End: 2018-02-08
Payer: COMMERCIAL

## 2018-02-08 VITALS
DIASTOLIC BLOOD PRESSURE: 75 MMHG | OXYGEN SATURATION: 95 % | HEIGHT: 64 IN | SYSTOLIC BLOOD PRESSURE: 114 MMHG | TEMPERATURE: 97.7 F | RESPIRATION RATE: 15 BRPM | WEIGHT: 186 LBS | HEART RATE: 78 BPM | BODY MASS INDEX: 31.76 KG/M2

## 2018-02-08 DIAGNOSIS — R09.82 PND (POST-NASAL DRIP): ICD-10-CM

## 2018-02-08 DIAGNOSIS — Z00.00 ROUTINE GENERAL MEDICAL EXAMINATION AT A HEALTH CARE FACILITY: Primary | ICD-10-CM

## 2018-02-08 DIAGNOSIS — R73.09 ABNORMAL BLOOD SUGAR: ICD-10-CM

## 2018-02-08 DIAGNOSIS — Z13.6 CARDIOVASCULAR SCREENING; LDL GOAL LESS THAN 160: ICD-10-CM

## 2018-02-08 DIAGNOSIS — F41.8 DEPRESSION WITH ANXIETY: ICD-10-CM

## 2018-02-08 DIAGNOSIS — N81.10 BLADDER PROLAPSE, FEMALE, ACQUIRED: ICD-10-CM

## 2018-02-08 PROCEDURE — G0438 PPPS, INITIAL VISIT: HCPCS | Performed by: PHYSICIAN ASSISTANT

## 2018-02-08 PROCEDURE — 36415 COLL VENOUS BLD VENIPUNCTURE: CPT | Performed by: PHYSICIAN ASSISTANT

## 2018-02-08 PROCEDURE — 84443 ASSAY THYROID STIM HORMONE: CPT | Performed by: PHYSICIAN ASSISTANT

## 2018-02-08 PROCEDURE — 80061 LIPID PANEL: CPT | Performed by: PHYSICIAN ASSISTANT

## 2018-02-08 PROCEDURE — 80053 COMPREHEN METABOLIC PANEL: CPT | Performed by: PHYSICIAN ASSISTANT

## 2018-02-08 RX ORDER — FLUTICASONE PROPIONATE 50 MCG
2 SPRAY, SUSPENSION (ML) NASAL DAILY
Qty: 16 G | Refills: 3 | Status: SHIPPED | OUTPATIENT
Start: 2018-02-08 | End: 2018-06-29

## 2018-02-08 RX ORDER — OXYBUTYNIN CHLORIDE 5 MG/1
5 TABLET ORAL 2 TIMES DAILY
Qty: 60 TABLET | Refills: 1 | Status: SHIPPED | OUTPATIENT
Start: 2018-02-08 | End: 2018-03-19

## 2018-02-08 RX ORDER — OXYBUTYNIN CHLORIDE 5 MG/1
5 TABLET, EXTENDED RELEASE ORAL DAILY
Qty: 90 TABLET | Refills: 1 | Status: SHIPPED | OUTPATIENT
Start: 2018-02-08 | End: 2018-02-08

## 2018-02-08 RX ORDER — ATORVASTATIN CALCIUM 40 MG/1
40 TABLET, FILM COATED ORAL DAILY
Qty: 90 TABLET | Refills: 3 | Status: SHIPPED | OUTPATIENT
Start: 2018-02-08 | End: 2019-02-19

## 2018-02-08 ASSESSMENT — ANXIETY QUESTIONNAIRES
1. FEELING NERVOUS, ANXIOUS, OR ON EDGE: SEVERAL DAYS
2. NOT BEING ABLE TO STOP OR CONTROL WORRYING: SEVERAL DAYS
6. BECOMING EASILY ANNOYED OR IRRITABLE: SEVERAL DAYS
7. FEELING AFRAID AS IF SOMETHING AWFUL MIGHT HAPPEN: SEVERAL DAYS
5. BEING SO RESTLESS THAT IT IS HARD TO SIT STILL: NOT AT ALL
GAD7 TOTAL SCORE: 6
IF YOU CHECKED OFF ANY PROBLEMS ON THIS QUESTIONNAIRE, HOW DIFFICULT HAVE THESE PROBLEMS MADE IT FOR YOU TO DO YOUR WORK, TAKE CARE OF THINGS AT HOME, OR GET ALONG WITH OTHER PEOPLE: NOT DIFFICULT AT ALL
3. WORRYING TOO MUCH ABOUT DIFFERENT THINGS: SEVERAL DAYS

## 2018-02-08 ASSESSMENT — PATIENT HEALTH QUESTIONNAIRE - PHQ9: 5. POOR APPETITE OR OVEREATING: SEVERAL DAYS

## 2018-02-08 NOTE — PROGRESS NOTES
SUBJECTIVE:   Ciera Casas is a 67 year old female who presents for Preventive Visit.  re you in the first 12 months of your Medicare Part B coverage?  No    Healthy Habits:    Do you get at least three servings of calcium containing foods daily (dairy, green leafy vegetables, etc.)? yes    Amount of exercise or daily activities, outside of work: 0-1 day(s) per week    Problems taking medications regularly No    Medication side effects: No    Have you had an eye exam in the past two years? yes    Do you see a dentist twice per year? Patient has dentures     Do you have sleep apnea, excessive snoring or daytime drowsiness?snoring      Ability to successfully perform activities of daily living: Yes, no assistance needed    Home safety:  none identified     Hearing impairment: No    Fall risk:  Fallen 2 or more times in the past year?: No  Any fall with injury in the past year?: No      COGNITIVE SCREEN  1) Repeat 3 items (Banana, Sunrise, Chair)    2) Clock draw: NORMAL  3) 3 item recall: Recalls 2 objects   Results: NORMAL clock, 1-2 items recalled: COGNITIVE IMPAIRMENT LESS LIKELY    Mini-CogTM Copyright S Singh. Licensed by the author for use in [facility]; reprinted with permission ([email or web address]). All rights reserved.          Other:  -Sinus problem off/on since last fall - has tried over the counter antihistamines with no real changes  -bladder issue, on going for years and was told patient has a fallen bladder  - wondering about memory vs depression issues - history of being on prozac, paxil and zoloft in the past with some side effects and fatigue; daughter is currently homeless and dealing with addictions so always worried about that and other life stressors.    Reviewed and updated as needed this visit by clinical staff  Tobacco  Allergies  Meds  Problems  Med Hx  Surg Hx  Fam Hx  Soc Hx          Reviewed and updated as needed this visit by Provider  Allergies  Meds  Problems         Social History   Substance Use Topics     Smoking status: Former Smoker     Packs/day: 0.50     Years: 30.00     Types: Cigarettes     Quit date: 2/1/2009     Smokeless tobacco: Never Used     Alcohol use Yes      Comment: a couple of drinks on the weekend       If you drink alcohol do you typically have >3 drinks per day or >7 drinks per week? No                        Today's PHQ-2 Score:   PHQ-2 ( 1999 [employer]) 2/8/2018 12/12/2016   Q1: Little interest or pleasure in doing things 1 0   Q2: Feeling down, depressed or hopeless 1 0   PHQ-2 Score 2 0       Do you feel safe in your environment - Yes    Do you have a Health Care Directive?: No: Advance care planning reviewed with patient; information given to patient to review.    Current providers sharing in care for this patient include:   Patient Care Team:  Sowmya Tinajero PA-C as PCP - General (Physician Assistant)  Sowmya Tinajero PA-C as MD (Physician Assistant)  Leandro Frye MD as MD (House Physician)    The following health maintenance items are reviewed in Epic and correct as of today:  Health Maintenance   Topic Date Due     ADVANCE DIRECTIVE PLANNING Q5 YRS  11/09/2017     FALL RISK ASSESSMENT  12/12/2017     SAURABH QUESTIONNAIRE 1 YEAR  12/12/2017     PHQ-9 Q6 MONTHS  01/10/2018     DEPRESSION ACTION PLAN Q1 YR  07/10/2018     MAMMO SCREEN Q2 YR (SYSTEM ASSIGNED)  11/06/2019     LIPID SCREEN Q5 YR FEMALE (SYSTEM ASSIGNED)  12/13/2021     TETANUS IMMUNIZATION (SYSTEM ASSIGNED)  03/02/2022     COLON CANCER SCREEN (SYSTEM ASSIGNED)  08/24/2022     INFLUENZA VACCINE (SYSTEM ASSIGNED)  Completed     DEXA SCAN SCREENING (SYSTEM ASSIGNED)  Completed     PNEUMOCOCCAL  Completed     HEPATITIS C SCREENING  Completed     Labs reviewed in EPIC  BP Readings from Last 3 Encounters:   02/08/18 114/75   08/21/17 110/60   07/31/17 111/68    Wt Readings from Last 3 Encounters:   02/08/18 186 lb (84.4 kg)   08/21/17 175 lb (79.4 kg)   07/31/17 175  lb (79.4 kg)                  Patient Active Problem List   Diagnosis     CARDIOVASCULAR SCREENING; LDL GOAL LESS THAN 160     Overweight     Abnormal blood sugar     Closed fracture of middle or proximal phalanx or phalanges of hand     Stiffness of joint, not elsewhere classified, hand     Pain in joint, hand     Other postprocedural status(V45.89)     Depression with anxiety     Atherosclerotic plaque     Chronic right shoulder pain     Past Surgical History:   Procedure Laterality Date     APPENDECTOMY       BACK SURGERY       C NONSPECIFIC PROCEDURE  1992    laminectomy     CLOSED REDUCTION, PERCUTANEOUS PINNING FINGER, COMBINED  4/6/2012    Procedure:COMBINED CLOSED REDUCTION, PERCUTANEOUS PINNING FINGER; Right Ring Finger Proximal Fracture Closed Reduction Internal Fixation, percutaneous pinning  Choice anesthesia; Surgeon:MILDRED DICKSON; Location: OR       Social History   Substance Use Topics     Smoking status: Former Smoker     Packs/day: 0.50     Years: 30.00     Types: Cigarettes     Quit date: 2/1/2009     Smokeless tobacco: Never Used     Alcohol use Yes      Comment: a couple of drinks on the weekend     Family History   Problem Relation Age of Onset     CANCER Mother      lung     Respiratory Mother      Emphysema     CANCER Father      esophagus     OSTEOPOROSIS Paternal Grandmother      Lipids Paternal Grandmother      Hypertension Maternal Aunt      Alzheimer Disease Other      fathers mother sister     Connective Tissue Disorder Other      lupus cousin     Depression Maternal Aunt      Respiratory Paternal Aunt      Asthma     Respiratory Other      neice     Respiratory Other      Asthma second cousin         Current Outpatient Prescriptions   Medication Sig Dispense Refill     atorvastatin (LIPITOR) 40 MG tablet Take 1 tablet (40 mg) by mouth daily 90 tablet 3     fluticasone (FLONASE) 50 MCG/ACT spray Spray 2 sprays into both nostrils daily 16 g 3     oxybutynin (DITROPAN XL) 5 MG 24 hr  "tablet Take 1 tablet (5 mg) by mouth daily 90 tablet 1     fish oil-omega-3 fatty acids 1000 MG capsule Take 2 g by mouth daily       Cholecalciferol (VITAMIN D3 PO) Take by mouth daily       aspirin 81 MG tablet Take 81 mg by mouth daily       [DISCONTINUED] atorvastatin (LIPITOR) 40 MG tablet TAKE ONE TABLET BY MOUTH EVERY DAY 90 tablet 0     Allergies   Allergen Reactions     No Known Drug Allergies      Recent Labs   Lab Test  12/13/16   0909  10/15/15   1007  02/26/14   0920   LDL  56  56  118   HDL  80  78  64   TRIG  53  66  73   ALT   --   39   --    CR   --   0.82   --    GFRESTIMATED   --   70   --    GFRESTBLACK   --   84   --    POTASSIUM   --   4.2   --         ROS:  Constitutional, HEENT, cardiovascular, pulmonary, GI, , musculoskeletal, neuro, skin, endocrine and psych systems are negative, except as otherwise noted.    OBJECTIVE:   /75 (BP Location: Left arm, Patient Position: Sitting, Cuff Size: Adult Large)  Pulse 78  Temp 97.7  F (36.5  C) (Oral)  Resp 15  Ht 5' 4\" (1.626 m)  Wt 186 lb (84.4 kg)  SpO2 95%  BMI 31.93 kg/m2 Estimated body mass index is 31.93 kg/(m^2) as calculated from the following:    Height as of this encounter: 5' 4\" (1.626 m).    Weight as of this encounter: 186 lb (84.4 kg).  EXAM:   GENERAL: healthy, alert and no distress  EYES: Eyes grossly normal to inspection, PERRL and conjunctivae and sclerae normal  HENT: ear canals and TM's normal, nose and mouth without ulcers or lesions  NECK: no adenopathy, no asymmetry, masses, or scars and thyroid normal to palpation  RESP: lungs clear to auscultation - no rales, rhonchi or wheezes  BREAST: normal without masses, tenderness or nipple discharge and no palpable axillary masses or adenopathy  CV: regular rate and rhythm, normal S1 S2, no S3 or S4, no murmur, click or rub, no peripheral edema and peripheral pulses strong  ABDOMEN: soft, nontender, no hepatosplenomegaly, no masses and bowel sounds normal  MS: no gross " "musculoskeletal defects noted, no edema  SKIN: no suspicious lesions or rashes  NEURO: Normal strength and tone, mentation intact and speech normal  PSYCH: mentation appears normal, affect normal/bright    ASSESSMENT / PLAN:       ICD-10-CM    1. Routine general medical examination at a health care facility Z00.00 OPHTHALMOLOGY ADULT REFERRAL   2. Abnormal blood sugar R73.09 Comprehensive metabolic panel   3. Depression with anxiety F41.8 TSH with free T4 reflex   4. Bladder prolapse, female, acquired N81.10 oxybutynin (DITROPAN XL) 5 MG 24 hr tablet   5. PND (post-nasal drip) R09.82 fluticasone (FLONASE) 50 MCG/ACT spray   6. CARDIOVASCULAR SCREENING; LDL GOAL LESS THAN 160 Z13.6 atorvastatin (LIPITOR) 40 MG tablet     Lipid panel reflex to direct LDL Fasting       End of Life Planning:  Patient currently has an advanced directive: No.  I have verified the patient's ablity to prepare an advanced directive/make health care decisions.  Literature was provided to assist patient in preparing an advanced directive.    COUNSELING:  Reviewed preventive health counseling, as reflected in patient instructions       Regular exercise       Healthy diet/nutrition       Vision screening    Estimated body mass index is 31.93 kg/(m^2) as calculated from the following:    Height as of this encounter: 5' 4\" (1.626 m).    Weight as of this encounter: 186 lb (84.4 kg).     reports that she quit smoking about 9 years ago. Her smoking use included Cigarettes. She has a 15.00 pack-year smoking history. She has never used smokeless tobacco.      Appropriate preventive services were discussed with this patient, including applicable screening as appropriate for cardiovascular disease, diabetes, osteopenia/osteoporosis, and glaucoma.  As appropriate for age/gender, discussed screening for colorectal cancer, prostate cancer, breast cancer, and cervical cancer. Checklist reviewing preventive services available has been given to the " patient.    Reviewed patients plan of care and provided an AVS. The Basic Care Plan (routine screening as documented in Health Maintenance) for Ciera meets the Care Plan requirement. This Care Plan has been established and reviewed with the Patient.    Counseling Resources:  ATP IV Guidelines  Pooled Cohorts Equation Calculator  Breast Cancer Risk Calculator  FRAX Risk Assessment  ICSI Preventive Guidelines  Dietary Guidelines for Americans, 2010  USDA's MyPlate  ASA Prophylaxis  Lung CA Screening    Sowmya Tinajero PA-C  [facility]

## 2018-02-08 NOTE — PATIENT INSTRUCTIONS
Preventive Health Recommendations    Female Ages 65 +    Yearly exam:     See your health care provider every year in order to  o Review health changes.   o Discuss preventive care.    o Review your medicines if your doctor has prescribed any.      You no longer need a yearly Pap test unless you've had an abnormal Pap test in the past 10 years. If you have vaginal symptoms, such as bleeding or discharge, be sure to talk with your provider about a Pap test.      Every 1 to 2 years, have a mammogram.  If you are over 69, talk with your health care provider about whether or not you want to continue having screening mammograms.      Every 10 years, have a colonoscopy. Or, have a yearly FIT test (stool test). These exams will check for colon cancer.       Have a cholesterol test every 5 years, or more often if your doctor advises it.       Have a diabetes test (fasting glucose) every three years. If you are at risk for diabetes, you should have this test more often.       At age 65, have a bone density scan (DEXA) to check for osteoporosis (brittle bone disease).    Shots:    Get a flu shot each year.    Get a tetanus shot every 10 years.    Talk to your doctor about your pneumonia vaccines. There are now two you should receive - Pneumovax (PPSV 23) and Prevnar (PCV 13).    Talk to your doctor about the shingles vaccine.    Talk to your doctor about the hepatitis B vaccine.    Nutrition:     Eat at least 5 servings of fruits and vegetables each day.      Eat whole-grain bread, whole-wheat pasta and brown rice instead of white grains and rice.      Talk to your provider about Calcium and Vitamin D.     Lifestyle    Exercise at least 150 minutes a week (30 minutes a day, 5 days a week). This will help you control your weight and prevent disease.      Limit alcohol to one drink per day.      No smoking.       Wear sunscreen to prevent skin cancer.       See your dentist twice a year for an exam and cleaning.      See your  "eye doctor every 1 to 2 years to screen for conditions such as glaucoma, macular degeneration and cataracts.    Discussed the pathophysiology of anxiety/depression episodes and the various symptoms seen associated with anxiety episodes. Discussed possible triggers including fatigue, depression, stress, and chemicals such as alcohol, caffeine and certain drugs. Discussed the treatment including an aerobic exercise program, adequate rest, and both rescue meds and maintenance meds.   For your anxiety:   1. Consider therapy - CBT - cognitive behavioral therapy - Daron Bailon's card given to patient.  2. \"The Chemistry of Calm\" by Wilson Banegas   3. \"Hope and Help for your Nerves\" by Nina Varner   4. Vitamin D 1031-6552 IU daily with magnesium 400-550 mg nightly or as needed nightly.  5. Valerian root extract for relaxation and sleep OR Melatonin at bedtime.     "

## 2018-02-08 NOTE — LETTER
February 9, 2018      Ciera Casas  [address]  [facility] [address]        Dear ,    We are writing to inform you of your test results.    Please send patient letter with the following:  This letter is sent to inform you of recent test results and to provide you with personal records of health information.    Your labs are overall within normal limits, but your liver enzymes (AST/ALT) are a little elevated.  This can be from dehydration, increased NSAIDS (ibuprofen, advil, aleve type products) or alcohol use. Please  monitor the intake of these things and we can repeat these levels in 3-6 months. If they remain elevated you may need a ultrasound of your liver.     Thank you for allowing me to participate in your care. If you have any further questions or problems, please contact me at [phone].    Resulted Orders   Comprehensive metabolic panel   Result Value Ref Range    Sodium 138 133 - 144 mmol/L    Potassium 4.6 3.4 - 5.3 mmol/L    Chloride 103 94 - 109 mmol/L    Carbon Dioxide 27 20 - 32 mmol/L    Anion Gap 8 3 - 14 mmol/L    Glucose 100 (H) 70 - 99 mg/dL      Comment:      Fasting specimen    Urea Nitrogen 19 7 - 30 mg/dL    Creatinine 0.85 0.52 - 1.04 mg/dL    GFR Estimate 67 >60 mL/min/1.7m2      Comment:      Non  GFR Calc    GFR Estimate If Black 81 >60 mL/min/1.7m2      Comment:       GFR Calc    Calcium 9.6 8.5 - 10.1 mg/dL    Bilirubin Total 0.6 0.2 - 1.3 mg/dL    Albumin 4.2 3.4 - 5.0 g/dL    Protein Total 7.6 6.8 - 8.8 g/dL    Alkaline Phosphatase 82 40 - 150 U/L    ALT 76 (H) 0 - 50 U/L    AST 47 (H) 0 - 45 U/L   Lipid panel reflex to direct LDL Fasting   Result Value Ref Range    Cholesterol 163 <200 mg/dL    Triglycerides 72 <150 mg/dL      Comment:      Fasting specimen    HDL Cholesterol 84 >49 mg/dL    LDL Cholesterol Calculated 65 <100 mg/dL      Comment:      Desirable:       <100 mg/dl    Non HDL Cholesterol 79 <130 mg/dL   TSH with  free T4 reflex   Result Value Ref Range    TSH 0.53 0.40 - 4.00 mU/L       If you have any questions or concerns, please call the clinic at the number listed above.       Sincerely,        Sowmya Tinajero PA-C/nr

## 2018-02-08 NOTE — MR AVS SNAPSHOT
After Visit Summary   2/8/2018    Ciera Casas    MRN: [id number]           Patient Information     Date Of Birth          1950        Visit Information        Provider Department      2/8/2018 10:40 AM Sowmya Tinajero PA-C [facility]        Today's Diagnoses     Routine general medical examination at a health care facility    -  1    Abnormal blood sugar        Depression with anxiety        Bladder prolapse, female, acquired        PND (post-nasal drip)        CARDIOVASCULAR SCREENING; LDL GOAL LESS THAN 160          Care Instructions      Preventive Health Recommendations    Female Ages 65 +    Yearly exam:     See your health care provider every year in order to  o Review health changes.   o Discuss preventive care.    o Review your medicines if your doctor has prescribed any.      You no longer need a yearly Pap test unless you've had an abnormal Pap test in the past 10 years. If you have vaginal symptoms, such as bleeding or discharge, be sure to talk with your provider about a Pap test.      Every 1 to 2 years, have a mammogram.  If you are over 69, talk with your health care provider about whether or not you want to continue having screening mammograms.      Every 10 years, have a colonoscopy. Or, have a yearly FIT test (stool test). These exams will check for colon cancer.       Have a cholesterol test every 5 years, or more often if your doctor advises it.       Have a diabetes test (fasting glucose) every three years. If you are at risk for diabetes, you should have this test more often.       At age 65, have a bone density scan (DEXA) to check for osteoporosis (brittle bone disease).    Shots:    Get a flu shot each year.    Get a tetanus shot every 10 years.    Talk to your doctor about your pneumonia vaccines. There are now two you should receive - Pneumovax (PPSV 23) and Prevnar (PCV 13).    Talk to your doctor about the shingles vaccine.    Talk to your  "doctor about the hepatitis B vaccine.    Nutrition:     Eat at least 5 servings of fruits and vegetables each day.      Eat whole-grain bread, whole-wheat pasta and brown rice instead of white grains and rice.      Talk to your provider about Calcium and Vitamin D.     Lifestyle    Exercise at least 150 minutes a week (30 minutes a day, 5 days a week). This will help you control your weight and prevent disease.      Limit alcohol to one drink per day.      No smoking.       Wear sunscreen to prevent skin cancer.       See your dentist twice a year for an exam and cleaning.      See your eye doctor every 1 to 2 years to screen for conditions such as glaucoma, macular degeneration and cataracts.    Discussed the pathophysiology of anxiety/depression episodes and the various symptoms seen associated with anxiety episodes. Discussed possible triggers including fatigue, depression, stress, and chemicals such as alcohol, caffeine and certain drugs. Discussed the treatment including an aerobic exercise program, adequate rest, and both rescue meds and maintenance meds.   For your anxiety:   1. Consider therapy - CBT - cognitive behavioral therapy - Daron Bailon's card given to patient.  2. \"The Chemistry of Calm\" by Wilson Banegas   3. \"Hope and Help for your Nerves\" by Nina Varner   4. Vitamin D 9655-3353 IU daily with magnesium 400-550 mg nightly or as needed nightly.  5. Valerian root extract for relaxation and sleep OR Melatonin at bedtime.             Follow-ups after your visit        Additional Services     OPHTHALMOLOGY ADULT REFERRAL       Your provider has referred you to: [facility]: [facility] [phone]   [email or web address]  MANSI: [facility] [place] [phone] [email or web address]  FHN: [facility] [phone]  FHN: [facility] [phone]   " "[email or web address]    Please be aware that coverage of these services is subject to the terms and limitations of your health insurance plan.  Call member services at your health plan with any benefit or coverage questions.      Please bring the following with you to your appointment:    (1) Any X-Rays, CTs or MRIs which have been performed.  Contact the facility where they were done to arrange for  prior to your scheduled appointment.    (2) List of current medications  (3) This referral request   (4) Any documents/labs given to you for this referral                  Follow-up notes from your care team     Return in about 6 months (around 8/8/2018), or if symptoms worsen or fail to improve.      Who to contact     If you have questions or need follow up information about today's clinic visit or your schedule please contact [facility] directly at [phone].  Normal or non-critical lab and imaging results will be communicated to you by MyChart, letter or phone within 4 business days after the clinic has received the results. If you do not hear from us within 7 days, please contact the clinic through [employer]Spr[employer] or phone. If you have a critical or abnormal lab result, we will notify you by phone as soon as possible.  Submit refill requests through [employer] or call your pharmacy and they will forward the refill request to us. Please allow 3 business days for your refill to be completed.          Additional Information About Your Visit        [employer] Information     [employer] lets you send messages to your doctor, view your test results, renew your prescriptions, schedule appointments and more. To sign up, go to [email or web address] . Click on \"Log in\" on the left side of the screen, which will take you to the Welcome page. Then click on \"Sign up Now\" on the right side of the page.     You will be asked to enter the access code listed below, as well as some " "personal information. Please follow the directions to create your username and password.     Your access code is: [email or web address]  Expires: 2018 11:13 AM     Your access code will  in 90 days. If you need help or a new code, please call your [place] clinic or [phone].        Care EveryWhere ID     This is your Care EveryWhere ID. This could be used by other organizations to access your [place] medical records  [id number]        Your Vitals Were     Pulse Temperature Respirations Height Pulse Oximetry BMI (Body Mass Index)    78 97.7  F (36.5  C) (Oral) 15 5' 4\" (1.626 m) 95% 31.93 kg/m2       Blood Pressure from Last 3 Encounters:   18 114/75   17 110/60   17 111/68    Weight from Last 3 Encounters:   18 186 lb (84.4 kg)   17 175 lb (79.4 kg)   17 175 lb (79.4 kg)              We Performed the Following     Comprehensive metabolic panel     Lipid panel reflex to direct LDL Fasting     OPHTHALMOLOGY ADULT REFERRAL     TSH with free T4 reflex          Today's Medication Changes          These changes are accurate as of 18 11:13 AM.  If you have any questions, ask your nurse or doctor.               Start taking these medicines.        Dose/Directions    fluticasone 50 MCG/ACT spray   Commonly known as:  FLONASE   Used for:  PND (post-nasal drip)   Started by:  Sowmya Tinajero PA-C        Dose:  2 spray   Spray 2 sprays into both nostrils daily   Quantity:  16 g   Refills:  3       oxybutynin 5 MG 24 hr tablet   Commonly known as:  DITROPAN XL   Used for:  Bladder prolapse, female, acquired   Started by:  Sowmya Tinajero PA-C        Dose:  5 mg   Take 1 tablet (5 mg) by mouth daily   Quantity:  90 tablet   Refills:  1         These medicines have changed or have updated prescriptions.        Dose/Directions    atorvastatin 40 MG tablet   Commonly known as:  LIPITOR   This may have changed:  See the new instructions.   Used for:  CARDIOVASCULAR " SCREENING; LDL GOAL LESS THAN 160   Changed by:  Sowmya Tinajero PA-C        Dose:  40 mg   Take 1 tablet (40 mg) by mouth daily   Quantity:  90 tablet   Refills:  3            Where to get your medicines      These medications were sent to [facility] [place], [place] - [address]  [address], [facility] [address]     Phone:  [phone]     atorvastatin 40 MG tablet    fluticasone 50 MCG/ACT spray    oxybutynin 5 MG 24 hr tablet                Primary Care Provider Office Phone # Fax #    Sowmya Tinajero PA-C [phone]       [address]  [facility] [address]        Equal Access to Services     CHARLEE TAVARES : Hadii gail johnsono Soedin, waaxda luqadaha, qaybta kaalmada adeegyada, prosper verma . So [place] [phone].    ATENCIÓN: Si habla español, tiene a varner disposición servicios gratuitos de asistencia lingüística. Ll[facility] [phone].    We comply with applicable federal civil rights laws and [place] laws. We do not discriminate on the basis of race, color, national origin, age, disability, sex, sexual orientation, or gender identity.            Thank you!     Thank you for choosing [facility]  for your care. Our goal is always to provide you with excellent care. Hearing back from our patients is one way we can continue to improve our services. Please take a few minutes to complete the written survey that you may receive in the mail after your visit with us. Thank you!             Your Updated Medication List - Protect others around you: Learn how to safely use, store and throw away your medicines at www.disposemymeds.org.          This list is accurate as of 2/8/18 11:13 AM.  Always use your most recent med list.                   Brand Name Dispense Instructions for use Diagnosis    aspirin 81 MG tablet      Take 81 mg by mouth daily        atorvastatin 40 MG tablet    LIPITOR    90 tablet     Take 1 tablet (40 mg) by mouth daily    CARDIOVASCULAR SCREENING; LDL GOAL LESS THAN 160       fish oil-omega-3 fatty acids 1000 MG capsule      Take 2 g by mouth daily        fluticasone 50 MCG/ACT spray    FLONASE    16 g    Spray 2 sprays into both nostrils daily    PND (post-nasal drip)       oxybutynin 5 MG 24 hr tablet    DITROPAN XL    90 tablet    Take 1 tablet (5 mg) by mouth daily    Bladder prolapse, female, acquired       VITAMIN D3 PO      Take by mouth daily

## 2018-02-09 ENCOUNTER — OFFICE VISIT (OUTPATIENT)
Dept: BEHAVIORAL HEALTH | Facility: CLINIC | Age: 68
End: 2018-02-09
Payer: COMMERCIAL

## 2018-02-09 DIAGNOSIS — F41.8 DEPRESSION WITH ANXIETY: Primary | ICD-10-CM

## 2018-02-09 LAB
ALBUMIN SERPL-MCNC: 4.2 G/DL (ref 3.4–5)
ALP SERPL-CCNC: 82 U/L (ref 40–150)
ALT SERPL W P-5'-P-CCNC: 76 U/L (ref 0–50)
ANION GAP SERPL CALCULATED.3IONS-SCNC: 8 MMOL/L (ref 3–14)
AST SERPL W P-5'-P-CCNC: 47 U/L (ref 0–45)
BILIRUB SERPL-MCNC: 0.6 MG/DL (ref 0.2–1.3)
BUN SERPL-MCNC: 19 MG/DL (ref 7–30)
CALCIUM SERPL-MCNC: 9.6 MG/DL (ref 8.5–10.1)
CHLORIDE SERPL-SCNC: 103 MMOL/L (ref 94–109)
CHOLEST SERPL-MCNC: 163 MG/DL
CO2 SERPL-SCNC: 27 MMOL/L (ref 20–32)
CREAT SERPL-MCNC: 0.85 MG/DL (ref 0.52–1.04)
GFR SERPL CREATININE-BSD FRML MDRD: 67 ML/MIN/1.7M2
GLUCOSE SERPL-MCNC: 100 MG/DL (ref 70–99)
HDLC SERPL-MCNC: 84 MG/DL
LDLC SERPL CALC-MCNC: 65 MG/DL
NONHDLC SERPL-MCNC: 79 MG/DL
POTASSIUM SERPL-SCNC: 4.6 MMOL/L (ref 3.4–5.3)
PROT SERPL-MCNC: 7.6 G/DL (ref 6.8–8.8)
SODIUM SERPL-SCNC: 138 MMOL/L (ref 133–144)
TRIGL SERPL-MCNC: 72 MG/DL
TSH SERPL DL<=0.005 MIU/L-ACNC: 0.53 MU/L (ref 0.4–4)

## 2018-02-09 PROCEDURE — 99207 ZZC NO CHARGE BEHAVIORAL WARM HANDOFF: CPT | Performed by: SOCIAL WORKER

## 2018-02-09 ASSESSMENT — ANXIETY QUESTIONNAIRES: GAD7 TOTAL SCORE: 6

## 2018-02-09 ASSESSMENT — PATIENT HEALTH QUESTIONNAIRE - PHQ9: SUM OF ALL RESPONSES TO PHQ QUESTIONS 1-9: 7

## 2018-02-09 NOTE — MR AVS SNAPSHOT
"              After Visit Summary   2018    Ciera Casas    MRN: [id number]           Patient Information     Date Of Birth          1950        Visit Information        Provider Department      2018 9:27 AM Daron Bailon, [facility]        Today's Diagnoses     Depression with anxiety    -  1       Follow-ups after your visit        Who to contact     If you have questions or need follow up information about today's clinic visit or your schedule please contact [facility] [place]note 2] directly at [phone].  Normal or non-critical lab and imaging results will be communicated to you by SmartPillhart, letter or phone within 4 business days after the clinic has received the results. If you do not hear from us within 7 days, please contact the clinic through SmartPillhart or phone. If you have a critical or abnormal lab result, we will notify you by phone as soon as possible.  Submit refill requests through [employer] or call your pharmacy and they will forward the refill request to us. Please allow 3 business days for your refill to be completed.          Additional Information About Your Visit        MyChart Information     [employer] lets you send messages to your doctor, view your test results, renew your prescriptions, schedule appointments and more. To sign up, go to [email or web address] . Click on \"Log in\" on the left side of the screen, which will take you to the Welcome page. Then click on \"Sign up Now\" on the right side of the page.     You will be asked to enter the access code listed below, as well as some personal information. Please follow the directions to create your username and password.     Your access code is: [email or web address]  Expires: 2018 11:13 AM     Your access code will  in 90 days. If you need help or a new code, please call your [facility] or [phone].        Care EveryWhere ID     This is your Care EveryWhere ID. This could be used by other " organizations to access your [place] medical records  [id number]         Blood Pressure from Last 3 Encounters:   02/08/18 114/75   08/21/17 110/60   07/31/17 111/68    Weight from Last 3 Encounters:   02/08/18 84.4 kg (186 lb)   08/21/17 79.4 kg (175 lb)   07/31/17 79.4 kg (175 lb)              Today, you had the following     No orders found for display       Primary Care Provider Office Phone # Fax #    Sowmya Tinajero PA-C [phone]       [address]  [facility] [address]        Equal Access to Services     SRIDHAR TAVARES : Hadii gail rosen hadasho Soedin, waaxda luqadaha, qaybta kaalmada adeflor, prosper verma . So [facility] [phone].    ATENCIÓN: Si habla español, tiene a varner disposición servicios gratuitos de asistencia lingüística. Ll[facility] [phone].    We comply with applicable federal civil rights laws and [place] laws. We do not discriminate on the basis of race, color, national origin, age, disability, sex, sexual orientation, or gender identity.            Thank you!     Thank you for choosing [facility]  for your care. Our goal is always to provide you with excellent care. Hearing back from our patients is one way we can continue to improve our services. Please take a few minutes to complete the written survey that you may receive in the mail after your visit with us. Thank you!             Your Updated Medication List - Protect others around you: Learn how to safely use, store and throw away your medicines at www.disposemymeds.org.          This list is accurate as of 2/9/18  9:30 AM.  Always use your most recent med list.                   Brand Name Dispense Instructions for use Diagnosis    aspirin 81 MG tablet      Take 81 mg by mouth daily        atorvastatin 40 MG tablet    LIPITOR    90 tablet    Take 1 tablet (40 mg) by mouth daily    CARDIOVASCULAR SCREENING; LDL GOAL LESS THAN 160       fish oil-omega-3  fatty acids 1000 MG capsule      Take 2 g by mouth daily        fluticasone 50 MCG/ACT spray    FLONASE    16 g    Spray 2 sprays into both nostrils daily    PND (post-nasal drip)       oxybutynin 5 MG tablet    DITROPAN    60 tablet    Take 1 tablet (5 mg) by mouth 2 times daily    Bladder prolapse, female, acquired       VITAMIN D3 PO      Take by mouth daily

## 2018-02-09 NOTE — PROGRESS NOTES
"Please send patient letter with the following:  This letter is sent to inform you of recent test results and to provide you with personal records of health information.    Your labs are overall within normal limits, but your liver enzymes (AST/ALT) are a little elevated.  This can be from dehydration, increased NSAIDS (ibuprofen, advil, aleve type products) or alcohol use. Please  monitor the intake of these things and we can repeat these levels in 3-6 months. If they remain elevated you may need a ultrasound of your liver.     Thank you for allowing me to participate in your care. If you have any further questions or problems, please contact me at [phone].    Sowmya Kirk \"Julio\" SLAVA Tinajero"

## 2018-02-09 NOTE — PROGRESS NOTES
Patient had appointment with his/her primary care physician. [facility] services were requested. Explained the role of the [facility] and provided informational handout and contact information for the [facility].   Patient reports she is tired and overwhelmed dealing with multiple stressors in her life.  Patient states her  is slowly dying due to chronic medical issues and she continues to worry about her adult daughter.  Patient states her daughter has a history of polysubstance abuse and recent legal problems.  She is currently homeless.  Her daughters children have been removed from her care.  Patient reports she retired last year and also self reflecting a lot on what she did wrong that because some of her adult children's issues.  Discussed with patient the power of the ones interpretation and perception of events that can mitigate stress.  Patient agreed this could be helpful.  Provided patient contact information of [facility].

## 2018-03-19 ENCOUNTER — OFFICE VISIT (OUTPATIENT)
Dept: FAMILY MEDICINE | Facility: CLINIC | Age: 68
End: 2018-03-19
Payer: COMMERCIAL

## 2018-03-19 ENCOUNTER — TELEPHONE (OUTPATIENT)
Dept: FAMILY MEDICINE | Facility: CLINIC | Age: 68
End: 2018-03-19

## 2018-03-19 VITALS
TEMPERATURE: 97.6 F | DIASTOLIC BLOOD PRESSURE: 66 MMHG | SYSTOLIC BLOOD PRESSURE: 110 MMHG | OXYGEN SATURATION: 97 % | RESPIRATION RATE: 13 BRPM | HEART RATE: 79 BPM

## 2018-03-19 DIAGNOSIS — N81.10 BLADDER PROLAPSE, FEMALE, ACQUIRED: ICD-10-CM

## 2018-03-19 PROCEDURE — 99213 OFFICE O/P EST LOW 20 MIN: CPT | Performed by: PHYSICIAN ASSISTANT

## 2018-03-19 RX ORDER — OXYBUTYNIN CHLORIDE 5 MG/1
5 TABLET ORAL 2 TIMES DAILY
Qty: 60 TABLET | Refills: 1 | Status: CANCELLED | OUTPATIENT
Start: 2018-03-19

## 2018-03-19 RX ORDER — DARIFENACIN 7.5 MG/1
7.5 TABLET, EXTENDED RELEASE ORAL DAILY
Status: CANCELLED | OUTPATIENT
Start: 2018-03-19

## 2018-03-19 RX ORDER — TOLTERODINE 2 MG/1
2 CAPSULE, EXTENDED RELEASE ORAL DAILY
Qty: 30 CAPSULE | Refills: 1 | Status: SHIPPED | OUTPATIENT
Start: 2018-03-19 | End: 2018-03-19

## 2018-03-19 RX ORDER — FESOTERODINE FUMARATE 4 MG/1
4 TABLET, FILM COATED, EXTENDED RELEASE ORAL DAILY
Qty: 30 TABLET | Status: CANCELLED | OUTPATIENT
Start: 2018-03-19

## 2018-03-19 NOTE — PATIENT INSTRUCTIONS
Pelvic Organ Prolapse: Surgery for Incontinence  The pelvic organs include the organs of your reproductive system and your urinary tract. The pelvic organs are supported by pelvic floor muscles. Pelvic floor muscles can weaken. This may cause one or more pelvic organs to fall out of place (prolapse). Pelvic organ prolapse can contribute to stress urinary incontinence (CROW). This is trouble controlling the flow of urine out of the body. You may have surgery to treat CROW. During this surgery, a prolapse can be fixed. One or more extra incisions may be needed to do this. Your surgeon can discuss the details with you.  Pelvic organ prolapse: common types     Cystocele.This is when the bladder sags into the vagina. To fix this, the bladder is moved back into its normal position. It is then sewn into place. The tissue between the vagina and bladder may be strengthened for better support to keep the bladder from sagging.     Uterine prolapse.This is when the uterus sags into the vagina. The uterus may fall as far as the opening of the vagina. To fix this, the uterus is often removed (hysterectomy). Or, the uterus may be sewn back into place.      Rectocele. This is when the rectum bulges into the vagina. An enterocele (much less common) is when the small intestine bulges into the vagina. During CROW surgery, the bulge in the rectum or small intestine can be fixed.    Vaginal vault prolapse. This is when the walls of the vagina fall in on themselves. It can happen if the uterus has been removed. Surgery can be done to lift the vagina and hold it in place.   Risks and possible complications of this surgery    Infection    Bleeding    Blood clots    Risks of anesthesia    Damage to nerves, muscles, or nearby pelvic structures    Prolapse of the pelvic organ or organs happening again   Date Last Reviewed: 7/1/2016 2000-2017 The "[employer]. [address] [place] [address], [place] [address]. All rights reserved. This  information is not intended as a substitute for professional medical care. Always follow your healthcare professional's instructions.

## 2018-03-19 NOTE — TELEPHONE ENCOUNTER
Writer called patient who asked writer questions about OB/GYN and Urology referrals.    Patient does want to check with insurance company regarding coverage of specialty services.    Writer encouraged patient to contact both OB/GYN and Urology to ask which specialty would specifically see patient with bladder prolapse. Once has this information, can schedule with the appropriate specialty and follow up with insurance company regarding coverage.    Patient verbalized understanding and in agreement with plan.  TIMOTHY Slade, RN

## 2018-03-19 NOTE — TELEPHONE ENCOUNTER
Reason for Call:  Other call back    Detailed comments: Patient is requesting a call back from PCP. States she has some follow up questions from today's visit. Please assist. Thanks!    Phone Number Patient can be reached at: Home number on file [phone] (home)    Best Time: Any    Can we leave a detailed message on this number? YES    Call taken on 3/19/2018 at 4:08 PM by Geetha Berger

## 2018-03-19 NOTE — PROGRESS NOTES
"  SUBJECTIVE:   Ciera Casas is a 67 year old female who presents to clinic today for the following health issues:    URINARY TRACT SYMPTOMS      Duration: On going for years     Description  urgency    Intensity:  mild    Accompanying signs and symptoms:  Fever/chills: no   Flank pain no   Nausea and vomiting: no   Vaginal symptoms: none  Abdominal/Pelvic Pain: no     History  History of frequent UTI's: no   History of kidney stones: no   Sexually Active: no   Possibility of pregnancy: No    Therapies tried and outcome: oxybutynin-does not help and made patient was having a hard time breathing and swallow      Patient tried prescription for ditropan 5 mg tablet with trouble breathing, felt throat was swallow.  Doesn't want to try any more medicine at this time, looking for urology referral.    Due to repeat liver enzymes in the next few weeks.  History of simlar issue back in 2004 - normal sonogram and labs at that time and levels improved after a few weeks - told it was a \"virus\".      Problem list and histories reviewed & adjusted, as indicated.  Additional history: as documented    Patient Active Problem List   Diagnosis     CARDIOVASCULAR SCREENING; LDL GOAL LESS THAN 160     Overweight     Abnormal blood sugar     Closed fracture of middle or proximal phalanx or phalanges of hand     Stiffness of joint, not elsewhere classified, hand     Pain in joint, hand     Other postprocedural status(V45.89)     Depression with anxiety     Atherosclerotic plaque     Chronic right shoulder pain     Past Surgical History:   Procedure Laterality Date     APPENDECTOMY       BACK SURGERY       C NONSPECIFIC PROCEDURE  1992    laminectomy     CLOSED REDUCTION, PERCUTANEOUS PINNING FINGER, COMBINED  4/6/2012    Procedure:COMBINED CLOSED REDUCTION, PERCUTANEOUS PINNING FINGER; Right Ring Finger Proximal Fracture Closed Reduction Internal Fixation, percutaneous pinning  Choice anesthesia; Surgeon:MILDRED DICKSON; Location:[facility] "       Social History   Substance Use Topics     Smoking status: Former Smoker     Packs/day: 0.50     Years: 30.00     Types: Cigarettes     Quit date: 2/1/2009     Smokeless tobacco: Never Used     Alcohol use Yes      Comment: a couple of drinks on the weekend     Family History   Problem Relation Age of Onset     CANCER Mother      lung     Respiratory Mother      Emphysema     CANCER Father      esophagus     OSTEOPOROSIS Paternal Grandmother      Lipids Paternal Grandmother      Hypertension Maternal Aunt      Alzheimer Disease Other      fathers mother sister     Connective Tissue Disorder Other      lupus cousin     Depression Maternal Aunt      Respiratory Paternal Aunt      Asthma     Respiratory Other      neice     Respiratory Other      Asthma second cousin         Current Outpatient Prescriptions   Medication Sig Dispense Refill     atorvastatin (LIPITOR) 40 MG tablet Take 1 tablet (40 mg) by mouth daily 90 tablet 3     fluticasone (FLONASE) 50 MCG/ACT spray Spray 2 sprays into both nostrils daily 16 g 3     fish oil-omega-3 fatty acids 1000 MG capsule Take 2 g by mouth daily       Cholecalciferol (VITAMIN D3 PO) Take by mouth daily       aspirin 81 MG tablet Take 81 mg by mouth daily       Allergies   Allergen Reactions     No Known Drug Allergies      Oxybutynin Difficulty breathing       Reviewed and updated as needed this visit by clinical staff       Reviewed and updated as needed this visit by Provider         ROS:  Constitutional, HEENT, cardiovascular, pulmonary, GI, , musculoskeletal, neuro, skin, endocrine and psych systems are negative, except as otherwise noted.    OBJECTIVE:     /66 (BP Location: Left arm, Patient Position: Sitting, Cuff Size: Adult Regular)  Pulse 79  Temp 97.6  F (36.4  C) (Oral)  Resp 13  SpO2 97%  There is no height or weight on file to calculate BMI.  GENERAL: healthy, alert and no distress  RESP: lungs clear to auscultation - no rales, rhonchi or  wheezes  CV: regular rate and rhythm, normal S1 S2, no S3 or S4, no murmur, click or rub, no peripheral edema and peripheral pulses strong  ABDOMEN: soft, nontender, no hepatosplenomegaly, no masses and bowel sounds normal  BACK: no CVA tenderness  PSYCH: mentation appears normal, affect normal/bright    Diagnostic Test Results:  none     ASSESSMENT/PLAN:       ICD-10-CM    1. Bladder prolapse, female, acquired N81.10 UROLOGY ADULT REFERRAL     OB/GYN REFERRAL     DISCONTINUED: tolterodine (DETROL LA) 2 MG 24 hr capsule       Patient Instructions       Pelvic Organ Prolapse: Surgery for Incontinence  The pelvic organs include the organs of your reproductive system and your urinary tract. The pelvic organs are supported by pelvic floor muscles. Pelvic floor muscles can weaken. This may cause one or more pelvic organs to fall out of place (prolapse). Pelvic organ prolapse can contribute to stress urinary incontinence (CROW). This is trouble controlling the flow of urine out of the body. You may have surgery to treat CROW. During this surgery, a prolapse can be fixed. One or more extra incisions may be needed to do this. Your surgeon can discuss the details with you.  Pelvic organ prolapse: common types     Cystocele.This is when the bladder sags into the vagina. To fix this, the bladder is moved back into its normal position. It is then sewn into place. The tissue between the vagina and bladder may be strengthened for better support to keep the bladder from sagging.     Uterine prolapse.This is when the uterus sags into the vagina. The uterus may fall as far as the opening of the vagina. To fix this, the uterus is often removed (hysterectomy). Or, the uterus may be sewn back into place.      Rectocele. This is when the rectum bulges into the vagina. An enterocele (much less common) is when the small intestine bulges into the vagina. During CROW surgery, the bulge in the rectum or small intestine can be fixed.    Vaginal  vault prolapse. This is when the walls of the vagina fall in on themselves. It can happen if the uterus has been removed. Surgery can be done to lift the vagina and hold it in place.   Risks and possible complications of this surgery    Infection    Bleeding    Blood clots    Risks of anesthesia    Damage to nerves, muscles, or nearby pelvic structures    Prolapse of the pelvic organ or organs happening again   Date Last Reviewed: 7/1/2016 2000-2017 [employer]. [address] [place] [address], [place] [address]. All rights reserved. This information is not intended as a substitute for professional medical care. Always follow your healthcare professional's instructions.      Sowmya Tinajero PA-C  [facility]

## 2018-03-19 NOTE — MR AVS SNAPSHOT
After Visit Summary   3/19/2018    Ciera Casas    MRN: [id number]           Patient Information     Date Of Birth          1950        Visit Information        Provider Department      3/19/2018 1:40 PM Sowmya Tinajero PA-C [facility]        Today's Diagnoses     Bladder prolapse, female, acquired          Care Instructions      Pelvic Organ Prolapse: Surgery for Incontinence  The pelvic organs include the organs of your reproductive system and your urinary tract. The pelvic organs are supported by pelvic floor muscles. Pelvic floor muscles can weaken. This may cause one or more pelvic organs to fall out of place (prolapse). Pelvic organ prolapse can contribute to stress urinary incontinence (CROW). This is trouble controlling the flow of urine out of the body. You may have surgery to treat CROW. During this surgery, a prolapse can be fixed. One or more extra incisions may be needed to do this. Your surgeon can discuss the details with you.  Pelvic organ prolapse: common types     Cystocele.This is when the bladder sags into the vagina. To fix this, the bladder is moved back into its normal position. It is then sewn into place. The tissue between the vagina and bladder may be strengthened for better support to keep the bladder from sagging.     Uterine prolapse.This is when the uterus sags into the vagina. The uterus may fall as far as the opening of the vagina. To fix this, the uterus is often removed (hysterectomy). Or, the uterus may be sewn back into place.      Rectocele. This is when the rectum bulges into the vagina. An enterocele (much less common) is when the small intestine bulges into the vagina. During CROW surgery, the bulge in the rectum or small intestine can be fixed.    Vaginal vault prolapse. This is when the walls of the vagina fall in on themselves. It can happen if the uterus has been removed. Surgery can be done to lift the vagina and hold it in  place.   Risks and possible complications of this surgery    Infection    Bleeding    Blood clots    Risks of anesthesia    Damage to nerves, muscles, or nearby pelvic structures    Prolapse of the pelvic organ or organs happening again   Date Last Reviewed: 7/1/2016 2000-2017 [employer]. [address] [place] [address]. All rights reserved. This information is not intended as a substitute for professional medical care. Always follow your healthcare professional's instructions.          Follow-ups after your visit        Additional Services     OB/GYN REFERRAL       Your provider has referred you to:  [facility]: [facility] [phone]   [email or web address]    Please be aware that coverage of these services is subject to the terms and limitations of your health insurance plan.  Call member services at your health plan with any benefit or coverage questions.      Please bring the following with you to your appointment:    (1) Any X-Rays, CTs or MRIs which have been performed.  Contact the facility where they were done to arrange for  prior to your scheduled appointment.   (2) List of current medications   (3) This referral request   (4) Any documents/labs given to you for this referral            UROLOGY ADULT REFERRAL       Your provider has referred you to: [facility]: [facility] [place] [phone]   [email or web address]  N: [facility] [phone]   [email or web address]  [place] [phone]   [email or web address]    Please be aware that coverage of these services is subject to the terms and limitations of your health insurance plan.  Call member services at your health plan with any benefit or coverage questions.      Please bring the following with you to your appointment:    (1) Any X-Rays, CTs or MRIs which have been performed.  Contact the facility where they  "were done to arrange for  prior to your scheduled appointment.    (2) List of current medications  (3) This referral request   (4) Any documents/labs given to you for this referral                  Who to contact     If you have questions or need follow up information about today's clinic visit or your schedule please contact [facility] directly at [phone].  Normal or non-critical lab and imaging results will be communicated to you by MyChart, letter or phone within 4 business days after the clinic has received the results. If you do not hear from us within 7 days, please contact the clinic through [employer] or phone. If you have a critical or abnormal lab result, we will notify you by phone as soon as possible.  Submit refill requests through [employer] or call your pharmacy and they will forward the refill request to us. Please allow 3 business days for your refill to be completed.          Additional Information About Your Visit        [employer] Information     [employer] lets you send messages to your doctor, view your test results, renew your prescriptions, schedule appointments and more. To sign up, go to [email or web address] . Click on \"Log in\" on the left side of the screen, which will take you to the Welcome page. Then click on \"Sign up Now\" on the right side of the page.     You will be asked to enter the access code listed below, as well as some personal information. Please follow the directions to create your username and password.     Your access code is: [email or web address]  Expires: 2018 12:13 PM     Your access code will  in 90 days. If you need help or a new code, please call your [place] clinic or [phone].        Care EveryWhere ID     This is your Care EveryWhere ID. This could be used by other organizations to access your [place] medical records  [id number]        Your Vitals Were     Pulse Temperature Respirations Pulse Oximetry          79 97.6  F (36.4  C) " (Oral) 13 97%         Blood Pressure from Last 3 Encounters:   03/19/18 110/66   02/08/18 114/75   08/21/17 110/60    Weight from Last 3 Encounters:   02/08/18 186 lb (84.4 kg)   08/21/17 175 lb (79.4 kg)   07/31/17 175 lb (79.4 kg)              We Performed the Following     OB/GYN REFERRAL     UROLOGY ADULT REFERRAL          Today's Medication Changes          These changes are accurate as of 3/19/18  1:43 PM.  If you have any questions, ask your nurse or doctor.               Stop taking these medicines if you haven't already. Please contact your care team if you have questions.     oxybutynin 5 MG tablet   Commonly known as:  DITROPAN                    Primary Care Provider Office Phone # Fax #    Sowmya Tinajero PA-C [phone]       [address]  [facility] [address]        Equal Access to Services     [facility] Center[facility] : Hadii aad ku hadasho Soedin, waaxda luqadaha, qaybta kaalmada adedevinyada, prosper verma . So [facility] [phone].    ATENCIÓN: Si habla español, tiene a varner disposición servicios gratuitos de asistencia lingüística. Mil al [phone].    We comply with applicable federal civil rights laws and [place] laws. We do not discriminate on the basis of race, color, national origin, age, disability, sex, sexual orientation, or gender identity.            Thank you!     Thank you for choosing [facility]  for your care. Our goal is always to provide you with excellent care. Hearing back from our patients is one way we can continue to improve our services. Please take a few minutes to complete the written survey that you may receive in the mail after your visit with us. Thank you!             Your Updated Medication List - Protect others around you: Learn how to safely use, store and throw away your medicines at www.disposemymeds.org.          This list is accurate as of 3/19/18  1:43 PM.  Always use your most recent med list.                    Brand Name Dispense Instructions for use Diagnosis    aspirin 81 MG tablet      Take 81 mg by mouth daily        atorvastatin 40 MG tablet    LIPITOR    90 tablet    Take 1 tablet (40 mg) by mouth daily    CARDIOVASCULAR SCREENING; LDL GOAL LESS THAN 160       fish oil-omega-3 fatty acids 1000 MG capsule      Take 2 g by mouth daily        fluticasone 50 MCG/ACT spray    FLONASE    16 g    Spray 2 sprays into both nostrils daily    PND (post-nasal drip)       VITAMIN D3 PO      Take by mouth daily

## 2018-04-05 ENCOUNTER — TELEPHONE (OUTPATIENT)
Dept: FAMILY MEDICINE | Facility: CLINIC | Age: 68
End: 2018-04-05

## 2018-04-05 ENCOUNTER — OFFICE VISIT (OUTPATIENT)
Dept: FAMILY MEDICINE | Facility: CLINIC | Age: 68
End: 2018-04-05
Payer: COMMERCIAL

## 2018-04-05 VITALS
RESPIRATION RATE: 15 BRPM | SYSTOLIC BLOOD PRESSURE: 105 MMHG | HEART RATE: 75 BPM | TEMPERATURE: 97.6 F | DIASTOLIC BLOOD PRESSURE: 72 MMHG | OXYGEN SATURATION: 95 %

## 2018-04-05 DIAGNOSIS — M79.10 GENERALIZED MUSCLE ACHE: ICD-10-CM

## 2018-04-05 DIAGNOSIS — R74.8 ELEVATED LIVER ENZYMES: ICD-10-CM

## 2018-04-05 DIAGNOSIS — R42 DIZZINESS: Primary | ICD-10-CM

## 2018-04-05 LAB
ALBUMIN UR-MCNC: NEGATIVE MG/DL
APPEARANCE UR: CLEAR
BACTERIA #/AREA URNS HPF: ABNORMAL /HPF
BASOPHILS # BLD AUTO: 0.1 10E9/L (ref 0–0.2)
BASOPHILS NFR BLD AUTO: 0.9 %
BILIRUB UR QL STRIP: NEGATIVE
COLOR UR AUTO: YELLOW
DIFFERENTIAL METHOD BLD: ABNORMAL
EOSINOPHIL # BLD AUTO: 0.3 10E9/L (ref 0–0.7)
EOSINOPHIL NFR BLD AUTO: 3.9 %
ERYTHROCYTE [DISTWIDTH] IN BLOOD BY AUTOMATED COUNT: 12.4 % (ref 10–15)
ERYTHROCYTE [SEDIMENTATION RATE] IN BLOOD BY WESTERGREN METHOD: 11 MM/H (ref 0–30)
GLUCOSE UR STRIP-MCNC: NEGATIVE MG/DL
HCT VFR BLD AUTO: 38.4 % (ref 35–47)
HGB BLD-MCNC: 13.1 G/DL (ref 11.7–15.7)
HGB UR QL STRIP: ABNORMAL
KETONES UR STRIP-MCNC: NEGATIVE MG/DL
LEUKOCYTE ESTERASE UR QL STRIP: ABNORMAL
LYMPHOCYTES # BLD AUTO: 2.9 10E9/L (ref 0.8–5.3)
LYMPHOCYTES NFR BLD AUTO: 46.1 %
MCH RBC QN AUTO: 33.7 PG (ref 26.5–33)
MCHC RBC AUTO-ENTMCNC: 34.1 G/DL (ref 31.5–36.5)
MCV RBC AUTO: 99 FL (ref 78–100)
MONOCYTES # BLD AUTO: 0.5 10E9/L (ref 0–1.3)
MONOCYTES NFR BLD AUTO: 8 %
NEUTROPHILS # BLD AUTO: 2.6 10E9/L (ref 1.6–8.3)
NEUTROPHILS NFR BLD AUTO: 41.1 %
NITRATE UR QL: NEGATIVE
NON-SQ EPI CELLS #/AREA URNS LPF: ABNORMAL /LPF
PH UR STRIP: 5.5 PH (ref 5–7)
PLATELET # BLD AUTO: 250 10E9/L (ref 150–450)
RBC # BLD AUTO: 3.89 10E12/L (ref 3.8–5.2)
RBC #/AREA URNS AUTO: ABNORMAL /HPF
SOURCE: ABNORMAL
SP GR UR STRIP: 1.02 (ref 1–1.03)
UROBILINOGEN UR STRIP-ACNC: 0.2 EU/DL (ref 0.2–1)
WBC # BLD AUTO: 6.4 10E9/L (ref 4–11)
WBC #/AREA URNS AUTO: ABNORMAL /HPF

## 2018-04-05 PROCEDURE — 80053 COMPREHEN METABOLIC PANEL: CPT | Performed by: PHYSICIAN ASSISTANT

## 2018-04-05 PROCEDURE — 84443 ASSAY THYROID STIM HORMONE: CPT | Performed by: PHYSICIAN ASSISTANT

## 2018-04-05 PROCEDURE — 85025 COMPLETE CBC W/AUTO DIFF WBC: CPT | Performed by: PHYSICIAN ASSISTANT

## 2018-04-05 PROCEDURE — 82306 VITAMIN D 25 HYDROXY: CPT | Performed by: PHYSICIAN ASSISTANT

## 2018-04-05 PROCEDURE — 99213 OFFICE O/P EST LOW 20 MIN: CPT | Performed by: PHYSICIAN ASSISTANT

## 2018-04-05 PROCEDURE — 85652 RBC SED RATE AUTOMATED: CPT | Performed by: PHYSICIAN ASSISTANT

## 2018-04-05 PROCEDURE — 36415 COLL VENOUS BLD VENIPUNCTURE: CPT | Performed by: PHYSICIAN ASSISTANT

## 2018-04-05 PROCEDURE — 87086 URINE CULTURE/COLONY COUNT: CPT | Performed by: PHYSICIAN ASSISTANT

## 2018-04-05 PROCEDURE — 81001 URINALYSIS AUTO W/SCOPE: CPT | Performed by: PHYSICIAN ASSISTANT

## 2018-04-05 NOTE — PATIENT INSTRUCTIONS
Labs updated today.  Push fluids and try over the counter NSAIDS (ibuprofen, advil, aleve type products).  Return to clinic with any worsening or changes in symptoms or go to ER Urgent care in off hours.

## 2018-04-05 NOTE — TELEPHONE ENCOUNTER
Patient states for the past week she has not been feeling well  Feels dizzy and light headed in the morning.  Has been every morning.  Feels better later in the day   Has been having pain in her back just above the hips on both sides  Had recent well exam in February and her labs were off a little, not sure if this is related  Appointment scheduled for this afternoon.  Kimberley Denise RN

## 2018-04-05 NOTE — MR AVS SNAPSHOT
"              After Visit Summary   4/5/2018    Ciera Casas    MRN: [id number]           Patient Information     Date Of Birth          1950        Visit Information        Provider Department      4/5/2018 2:40 PM Sowmya Tinajero PA-C [facility]        Today's Diagnoses     Dizziness    -  1    Generalized muscle ache        Elevated liver enzymes          Care Instructions    Labs updated today.  Push fluids and try over the counter NSAIDS (ibuprofen, advil, aleve type products).  Return to clinic with any worsening or changes in symptoms or go to ER Urgent care in off hours.          Follow-ups after your visit        Follow-up notes from your care team     Return if symptoms worsen or fail to improve.      Who to contact     If you have questions or need follow up information about today's clinic visit or your schedule please contact [facility] directly at [phone].  Normal or non-critical lab and imaging results will be communicated to you by MyChart, letter or phone within 4 business days after the clinic has received the results. If you do not hear from us within 7 days, please contact the clinic through MyChart or phone. If you have a critical or abnormal lab result, we will notify you by phone as soon as possible.  Submit refill requests through [employer] or call your pharmacy and they will forward the refill request to us. Please allow 3 business days for your refill to be completed.          Additional Information About Your Visit        MyChart Information     [employer] lets you send messages to your doctor, view your test results, renew your prescriptions, schedule appointments and more. To sign up, go to [email or web address] . Click on \"Log in\" on the left side of the screen, which will take you to the Welcome page. Then click on \"Sign up Now\" on the right side of the page.     You will be asked to enter the access code listed below, as well as some " personal information. Please follow the directions to create your username and password.     Your access code is: [email or web address]  Expires: 2018 12:13 PM     Your access code will  in 90 days. If you need help or a new code, please call your [place] clinic or [phone].        Care EveryWhere ID     This is your Care EveryWhere ID. This could be used by other organizations to access your [place] medical records  [id number]        Your Vitals Were     Pulse Temperature Respirations Pulse Oximetry          75 97.6  F (36.4  C) (Oral) 15 95%         Blood Pressure from Last 3 Encounters:   18 105/72   18 110/66   18 114/75    Weight from Last 3 Encounters:   18 186 lb (84.4 kg)   17 175 lb (79.4 kg)   17 175 lb (79.4 kg)              We Performed the Following     CBC with platelets differential     Comprehensive metabolic panel     ESR: Erythrocyte sedimentation rate     JUST IN CASE     TSH with free T4 reflex     UA reflex to Microscopic and Culture     Urine Culture Aerobic Bacterial     Urine Microscopic     Vitamin D Deficiency        Primary Care Provider Office Phone # Fax #    Sowmya Tinajero PA-C [phone]       [address] [place] [address]        Equal Access to Services     CHARLEE TAVARES AH: Hadii aad ku hadasho Soomaali, waaxda luqadaha, qaybta kaalmada adeegyada, waxay idiin hayyefrin turner prater. So [facility] [phone].    ATENCIÓN: Si habla español, tiene a varner disposición servicios gratuitos de asistencia lingüística. Lldena al [phone].    We comply with applicable federal civil rights laws and [place] laws. We do not discriminate on the basis of race, color, national origin, age, disability, sex, sexual orientation, or gender identity.            Thank you!     Thank you for choosing [facility]  for your care. Our goal is always to provide you with excellent care. Hearing back from our  patients is one way we can continue to improve our services. Please take a few minutes to complete the written survey that you may receive in the mail after your visit with us. Thank you!             Your Updated Medication List - Protect others around you: Learn how to safely use, store and throw away your medicines at www.disposemymeds.org.          This list is accurate as of 4/5/18  3:21 PM.  Always use your most recent med list.                   Brand Name Dispense Instructions for use Diagnosis    aspirin 81 MG tablet      Take 81 mg by mouth daily        atorvastatin 40 MG tablet    LIPITOR    90 tablet    Take 1 tablet (40 mg) by mouth daily    CARDIOVASCULAR SCREENING; LDL GOAL LESS THAN 160       fish oil-omega-3 fatty acids 1000 MG capsule      Take 2 g by mouth daily        fluticasone 50 MCG/ACT spray    FLONASE    16 g    Spray 2 sprays into both nostrils daily    PND (post-nasal drip)       VITAMIN D3 PO      Take by mouth daily

## 2018-04-05 NOTE — PROGRESS NOTES
SUBJECTIVE:   Ciera Casas is a 67 year old female who presents to clinic today for the following health issues:      Musculoskeletal problem/pain      Duration: 1 week    Description  Location: both hips and sides    Intensity:  moderate    Accompanying signs and symptoms: feeling lightheadedness with the back pain     History  Previous similar problem: YES  Previous evaluation:  none    Precipitating or alleviating factors:  Trauma or overuse: no   Aggravating factors include: laying down for too long     Therapies tried and outcome: nothing    Liver enzymes a little elevated earlier this year with routine labs.    Patient fell a few weeks ago with history of nightmares at nights - fell back and hurt tailbone and may have hit her head.    No headaches, change in vision/hearing, trouble with memory or words.          Problem list and histories reviewed & adjusted, as indicated.  Additional history: as documented    Patient Active Problem List   Diagnosis     CARDIOVASCULAR SCREENING; LDL GOAL LESS THAN 160     Overweight     Abnormal blood sugar     Closed fracture of middle or proximal phalanx or phalanges of hand     Stiffness of joint, not elsewhere classified, hand     Pain in joint, hand     Other postprocedural status(V45.89)     Depression with anxiety     Atherosclerotic plaque     Chronic right shoulder pain     Past Surgical History:   Procedure Laterality Date     APPENDECTOMY       BACK SURGERY       C NONSPECIFIC PROCEDURE  1992    laminectomy     CLOSED REDUCTION, PERCUTANEOUS PINNING FINGER, COMBINED  4/6/2012    Procedure:COMBINED CLOSED REDUCTION, PERCUTANEOUS PINNING FINGER; Right Ring Finger Proximal Fracture Closed Reduction Internal Fixation, percutaneous pinning  Choice anesthesia; Surgeon:MILDRED DICKSON; Location: OR       Social History   Substance Use Topics     Smoking status: Former Smoker     Packs/day: 0.50     Years: 30.00     Types: Cigarettes     Quit date: 2/1/2009      Smokeless tobacco: Never Used     Alcohol use Yes      Comment: a couple of drinks on the weekend     Family History   Problem Relation Age of Onset     CANCER Mother      lung     Respiratory Mother      Emphysema     CANCER Father      esophagus     OSTEOPOROSIS Paternal Grandmother      Lipids Paternal Grandmother      Hypertension Maternal Aunt      Alzheimer Disease Other      fathers mother sister     Connective Tissue Disorder Other      lupus cousin     Depression Maternal Aunt      Respiratory Paternal Aunt      Asthma     Respiratory Other      neice     Respiratory Other      Asthma second cousin         Current Outpatient Prescriptions   Medication Sig Dispense Refill     atorvastatin (LIPITOR) 40 MG tablet Take 1 tablet (40 mg) by mouth daily 90 tablet 3     fluticasone (FLONASE) 50 MCG/ACT spray Spray 2 sprays into both nostrils daily 16 g 3     fish oil-omega-3 fatty acids 1000 MG capsule Take 2 g by mouth daily       Cholecalciferol (VITAMIN D3 PO) Take by mouth daily       aspirin 81 MG tablet Take 81 mg by mouth daily       Allergies   Allergen Reactions     No Known Drug Allergies      Oxybutynin Difficulty breathing     BP Readings from Last 3 Encounters:   04/05/18 105/72   03/19/18 110/66   02/08/18 114/75    Wt Readings from Last 3 Encounters:   02/08/18 186 lb (84.4 kg)   08/21/17 175 lb (79.4 kg)   07/31/17 175 lb (79.4 kg)               Reviewed and updated as needed this visit by clinical staff  Tobacco  Allergies  Meds  Problems  Med Hx  Surg Hx  Fam Hx  Soc Hx        Reviewed and updated as needed this visit by Provider  Allergies  Meds  Problems         ROS:  Constitutional, HEENT, cardiovascular, pulmonary, GI, , musculoskeletal, neuro, skin, endocrine and psych systems are negative, except as otherwise noted.    OBJECTIVE:     /72 (BP Location: Left arm, Patient Position: Sitting, Cuff Size: Adult Regular)  Pulse 75  Temp 97.6  F (36.4  C) (Oral)  Resp 15  SpO2  95%  There is no height or weight on file to calculate BMI.  GENERAL: healthy, alert and no distress  EYES: Eyes grossly normal to inspection, PERRL and conjunctivae and sclerae normal  HENT: ear canals and TM's normal, nose and mouth without ulcers or lesions  NECK: no adenopathy, no asymmetry, masses, or scars and thyroid normal to palpation  RESP: lungs clear to auscultation - no rales, rhonchi or wheezes  CV: regular rate and rhythm, normal S1 S2, no S3 or S4, no murmur, click or rub, no peripheral edema and peripheral pulses strong  MS: no gross musculoskeletal defects noted, no edema  NEURO: Normal strength and tone, mentation intact and speech normal  PSYCH: mentation appears normal, affect normal/bright    Diagnostic Test Results:  none     ASSESSMENT/PLAN:       ICD-10-CM    1. Dizziness R42 CBC with platelets differential     Comprehensive metabolic panel     Vitamin D Deficiency     TSH with free T4 reflex     UA reflex to Microscopic and Culture     Urine Microscopic   2. Generalized muscle ache M79.1 CBC with platelets differential     Comprehensive metabolic panel     Vitamin D Deficiency     TSH with free T4 reflex     UA reflex to Microscopic and Culture     Urine Culture Aerobic Bacterial     ESR: Erythrocyte sedimentation rate   3. Elevated liver enzymes R74.8 Comprehensive metabolic panel     JUST IN CASE       Patient Instructions   Labs updated today.  Push fluids and try over the counter NSAIDS (ibuprofen, advil, aleve type products).  Return to clinic with any worsening or changes in symptoms or go to ER Urgent care in off hours.      Sowmya Tinajero PA-C  [facility]

## 2018-04-06 LAB
ALBUMIN SERPL-MCNC: 3.9 G/DL (ref 3.4–5)
ALP SERPL-CCNC: 74 U/L (ref 40–150)
ALT SERPL W P-5'-P-CCNC: 39 U/L (ref 0–50)
ANION GAP SERPL CALCULATED.3IONS-SCNC: 9 MMOL/L (ref 3–14)
AST SERPL W P-5'-P-CCNC: 26 U/L (ref 0–45)
BACTERIA SPEC CULT: NO GROWTH
BILIRUB SERPL-MCNC: 0.3 MG/DL (ref 0.2–1.3)
BUN SERPL-MCNC: 19 MG/DL (ref 7–30)
CALCIUM SERPL-MCNC: 9.6 MG/DL (ref 8.5–10.1)
CHLORIDE SERPL-SCNC: 107 MMOL/L (ref 94–109)
CO2 SERPL-SCNC: 24 MMOL/L (ref 20–32)
CREAT SERPL-MCNC: 0.98 MG/DL (ref 0.52–1.04)
DEPRECATED CALCIDIOL+CALCIFEROL SERPL-MC: 42 UG/L (ref 20–75)
GFR SERPL CREATININE-BSD FRML MDRD: 56 ML/MIN/1.7M2
GLUCOSE SERPL-MCNC: 95 MG/DL (ref 70–99)
POTASSIUM SERPL-SCNC: 4.7 MMOL/L (ref 3.4–5.3)
PROT SERPL-MCNC: 7.2 G/DL (ref 6.8–8.8)
SODIUM SERPL-SCNC: 140 MMOL/L (ref 133–144)
SPECIMEN SOURCE: NORMAL
TSH SERPL DL<=0.005 MIU/L-ACNC: 0.42 MU/L (ref 0.4–4)

## 2018-04-09 ENCOUNTER — TELEPHONE (OUTPATIENT)
Dept: FAMILY MEDICINE | Facility: CLINIC | Age: 68
End: 2018-04-09

## 2018-04-09 NOTE — TELEPHONE ENCOUNTER
"           Please call patient with the following message:   Labs all within normal limits and liver enzymes are improved.   Check on symptoms with patient please too.   Thanks   Sowmya \"Julio\" SLAVA Tinajero LM for pt to call clinic triage back.  FRANCESCO Cruz    "

## 2018-04-09 NOTE — PROGRESS NOTES
"Please call patient with the following message:  Labs all within normal limits and liver enzymes are improved.  Check on symptoms with patient please too.  Thanks  Sowmya \"Julio\" SLAVA Tinajero"

## 2018-04-09 NOTE — TELEPHONE ENCOUNTER
Ciera called back and says she started feeling better the very next day after being seen last week.  Now all symptoms resolved.  I asked her to call back if she has any questions or concerns.    Dinora MAYA

## 2018-04-11 ENCOUNTER — TRANSFERRED RECORDS (OUTPATIENT)
Dept: HEALTH INFORMATION MANAGEMENT | Facility: CLINIC | Age: 68
End: 2018-04-11

## 2018-04-18 ENCOUNTER — OFFICE VISIT (OUTPATIENT)
Dept: URGENT CARE | Facility: URGENT CARE | Age: 68
End: 2018-04-18
Payer: COMMERCIAL

## 2018-04-18 ENCOUNTER — TELEPHONE (OUTPATIENT)
Dept: FAMILY MEDICINE | Facility: CLINIC | Age: 68
End: 2018-04-18

## 2018-04-18 ENCOUNTER — RADIANT APPOINTMENT (OUTPATIENT)
Dept: GENERAL RADIOLOGY | Facility: CLINIC | Age: 68
End: 2018-04-18
Attending: PHYSICIAN ASSISTANT
Payer: COMMERCIAL

## 2018-04-18 VITALS
TEMPERATURE: 97.8 F | HEART RATE: 81 BPM | DIASTOLIC BLOOD PRESSURE: 82 MMHG | BODY MASS INDEX: 31.77 KG/M2 | OXYGEN SATURATION: 98 % | WEIGHT: 185.06 LBS | RESPIRATION RATE: 18 BRPM | SYSTOLIC BLOOD PRESSURE: 130 MMHG

## 2018-04-18 DIAGNOSIS — R06.2 WHEEZING: Primary | ICD-10-CM

## 2018-04-18 DIAGNOSIS — R05.8 PRODUCTIVE COUGH: ICD-10-CM

## 2018-04-18 PROCEDURE — 94640 AIRWAY INHALATION TREATMENT: CPT | Performed by: PHYSICIAN ASSISTANT

## 2018-04-18 PROCEDURE — 99214 OFFICE O/P EST MOD 30 MIN: CPT | Mod: 25 | Performed by: PHYSICIAN ASSISTANT

## 2018-04-18 PROCEDURE — 71046 X-RAY EXAM CHEST 2 VIEWS: CPT | Mod: FY

## 2018-04-18 RX ORDER — ALBUTEROL SULFATE 90 UG/1
2 AEROSOL, METERED RESPIRATORY (INHALATION) EVERY 6 HOURS PRN
Qty: 1 INHALER | Refills: 0 | Status: SHIPPED | OUTPATIENT
Start: 2018-04-18 | End: 2018-06-29

## 2018-04-18 RX ORDER — PREDNISONE 10 MG/1
10 TABLET ORAL DAILY
Qty: 5 TABLET | Refills: 0 | Status: SHIPPED | OUTPATIENT
Start: 2018-04-18 | End: 2019-02-25

## 2018-04-18 RX ORDER — LEVALBUTEROL INHALATION SOLUTION 1.25 MG/3ML
SOLUTION RESPIRATORY (INHALATION)
Qty: 1 ML | Refills: 0
Start: 2018-04-18 | End: 2018-06-29

## 2018-04-18 NOTE — PATIENT INSTRUCTIONS
(R06.2) Wheezing  (primary encounter diagnosis)  Comment: likely secondary to viral illness  Plan: INHALATION/NEBULIZER TREATMENT, INITIAL,         levalbuterol (XOPENEX) 1.25 MG/3ML neb         solution, LEVALBUTEROL UNIT DOSE, 0.5 MG,         predniSONE (DELTASONE) 10 MG tablet, albuterol         (PROAIR HFA/PROVENTIL HFA/VENTOLIN HFA) 108 (90        Base) MCG/ACT Inhaler            (R05) Productive cough  Comment: consistent with viral illness  Plan:  XR Chest 2 Views        Chest xray was normal today    Follow up with your primary clinic within 3-5 days, sooner should symptoms persist or worsen.      I do not think that your symptoms today are related to the Vesicare, however, I would recommend that you stop the medication for now until you follow up with your urologist.

## 2018-04-18 NOTE — PROGRESS NOTES
SUBJECTIVE:   Ciera Casas is a 67 year old female presenting with a chief complaint of:  1) cough and cold symptoms for the past few days.    No fevers.    2) cough is productive  3) wheezing.   4) post nasal drip.  Onset of symptoms was as above.    Patient wonders if it might be secondary to the new medication, Vesicare, that she started taking about a week ago.      Denies any trouble swallowing, but does complain that she feels something in her throat and is is different when swallowing.    Course of illness is worsening.    Severity moderate  Current and Associated symptoms: as above  Treatment measures tried include None tried.  Predisposing factors include None.    Past Medical History:   Diagnosis Date     Abnormal blood sugar      CARDIOVASCULAR SCREENING; LDL GOAL LESS THAN 160 5/9/2010     Depression with anxiety      Hepatitis, unspecified 4/2004     Major depression      Overweight(278.02)      Patient Active Problem List   Diagnosis     CARDIOVASCULAR SCREENING; LDL GOAL LESS THAN 160     Overweight     Abnormal blood sugar     Closed fracture of middle or proximal phalanx or phalanges of hand     Stiffness of joint, not elsewhere classified, hand     Pain in joint, hand     Other postprocedural status(V45.89)     Depression with anxiety     Atherosclerotic plaque     Chronic right shoulder pain     Social History   Substance Use Topics     Smoking status: Former Smoker     Packs/day: 0.50     Years: 30.00     Types: Cigarettes     Quit date: 2/1/2009     Smokeless tobacco: Never Used     Alcohol use Yes      Comment: a couple of drinks on the weekend       ROS:  CONSTITUTIONAL:NEGATIVE for fever, chills, change in weight  INTEGUMENTARY/SKIN: NEGATIVE for worrisome rashes, moles or lesions  EYES: NEGATIVE for vision changes or irritation  ENT/MOUTH: as per HPI  RESP:as per HPI  CV: NEGATIVE for chest pain, palpitations or peripheral edema  GI: NEGATIVE for nausea, abdominal pain, heartburn, or  change in bowel habits  MUSCULOSKELETAL: NEGATIVE for significant arthralgias or myalgia  NEURO: NEGATIVE for weakness, dizziness or paresthesias  Review of systems negative except as stated above.    OBJECTIVE  :/82  Pulse 81  Temp 97.8  F (36.6  C) (Oral)  Resp 18  Wt 185 lb 1 oz (83.9 kg)  SpO2 98%  Breastfeeding? No  BMI 31.77 kg/m2  GENERAL APPEARANCE: healthy, alert and no distress  EYES: EOMI,  PERRL, conjunctiva clear  HENT: ear canals and TM's normal.  Nose and mouth without ulcers, erythema or lesions  HENT: nasal turbinates boggy with bluish hue and rhinorrhea clear  NECK: supple, nontender, no lymphadenopathy  RESP: wheezing and rhonchi throughout which improve with neb in clinic.  Pulse ox after neb is 100%  CV: regular rates and rhythm, normal S1 S2, no murmur noted  ABDOMEN:  soft, nontender, no HSM or masses and bowel sounds normal  NEURO: Normal strength and tone, sensory exam grossly normal,  normal speech and mentation  SKIN: no suspicious lesions or rashes    (R06.2) Wheezing  (primary encounter diagnosis)  Comment: likely secondary to viral illness  Plan: INHALATION/NEBULIZER TREATMENT, INITIAL,         levalbuterol (XOPENEX) 1.25 MG/3ML neb         solution, LEVALBUTEROL UNIT DOSE, 0.5 MG,         predniSONE (DELTASONE) 10 MG tablet, albuterol         (PROAIR HFA/PROVENTIL HFA/VENTOLIN HFA) 108 (90        Base) MCG/ACT Inhaler            (R05) Productive cough  Comment: consistent with viral illness  Plan:  XR Chest 2 Views        Chest xray was normal today    Follow up with your primary clinic within 3-5 days, sooner should symptoms persist or worsen.      I do not think that your symptoms today are related to the Vesicare, however, I would recommend that you stop the medication for now until you follow up with your urologist.      Greater than 50% of the 40 minutes spent face to face with patient was discussing and reviewing the results of diagnostic tests, discussing risks and  benefits of management (treatment) options, instruction for management and follow up and importance of compliance with treatment.

## 2018-04-18 NOTE — MR AVS SNAPSHOT
After Visit Summary   4/18/2018    Ciera Casas    MRN: [id number]           Patient Information     Date Of Birth          1950        Visit Information        Provider Department      4/18/2018 11:45 AM Norma Gilbert PA-C [facility]        Today's Diagnoses     Wheezing    -  1    Productive cough          Care Instructions    (R06.2) Wheezing  (primary encounter diagnosis)  Comment: likely secondary to viral illness  Plan: INHALATION/NEBULIZER TREATMENT, INITIAL,         levalbuterol (XOPENEX) 1.25 MG/3ML neb         solution, LEVALBUTEROL UNIT DOSE, 0.5 MG,         predniSONE (DELTASONE) 10 MG tablet, albuterol         (PROAIR HFA/PROVENTIL HFA/VENTOLIN HFA) 108 (90        Base) MCG/ACT Inhaler            (R05) Productive cough  Comment: consistent with viral illness  Plan:  XR Chest 2 Views        Chest xray was normal today    Follow up with your primary clinic within 3-5 days, sooner should symptoms persist or worsen.      I do not think that your symptoms today are related to the Vesicare, however, I would recommend that you stop the medication for now until you follow up with your urologist.                Follow-ups after your visit        Who to contact     If you have questions or need follow up information about today's clinic visit or your schedule please contact [facility] directly at [phone].  Normal or non-critical lab and imaging results will be communicated to you by MyChart, letter or phone within 4 business days after the clinic has received the results. If you do not hear from us within 7 days, please contact the clinic through MyChart or phone. If you have a critical or abnormal lab result, we will notify you by phone as soon as possible.  Submit refill requests through u.sit or call your pharmacy and they will forward the refill request to us. Please allow 3 business days for your refill to be  "completed.          Additional Information About Your Visit        [employer] Information     [employer] lets you send messages to your doctor, view your test results, renew your prescriptions, schedule appointments and more. To sign up, go to [email or web address] . Click on \"Log in\" on the left side of the screen, which will take you to the Welcome page. Then click on \"Sign up Now\" on the right side of the page.     You will be asked to enter the access code listed below, as well as some personal information. Please follow the directions to create your username and password.     Your access code is: [email or web address]  Expires: 2018 12:13 PM     Your access code will  in 90 days. If you need help or a new code, please call your [place] clinic or [phone].        Care EveryWhere ID     This is your Care EveryWhere ID. This could be used by other organizations to access your [place] medical records  [id number]        Your Vitals Were     Pulse Temperature Respirations Pulse Oximetry Breastfeeding? BMI (Body Mass Index)    81 97.8  F (36.6  C) (Oral) 18 98% No 31.77 kg/m2       Blood Pressure from Last 3 Encounters:   18 130/82   18 105/72   18 110/66    Weight from Last 3 Encounters:   18 185 lb 1 oz (83.9 kg)   18 186 lb (84.4 kg)   17 175 lb (79.4 kg)              We Performed the Following     INHALATION/NEBULIZER TREATMENT, INITIAL     LEVALBUTEROL UNIT DOSE, 0.5 MG          Today's Medication Changes          These changes are accurate as of 18  1:45 PM.  If you have any questions, ask your nurse or doctor.               Start taking these medicines.        Dose/Directions    albuterol 108 (90 Base) MCG/ACT Inhaler   Commonly known as:  PROAIR HFA/PROVENTIL HFA/VENTOLIN HFA   Used for:  Wheezing   Started by:  Norma Gilbert PA-C        Dose:  2 puff   Inhale 2 puffs into the lungs every 6 hours as needed for shortness of breath / dyspnea or wheezing "   Quantity:  1 Inhaler   Refills:  0       levalbuterol 1.25 MG/3ML neb solution   Commonly known as:  XOPENEX   Used for:  Wheezing   Started by:  Norma Gilbert PA-C        One nebulizer treatment in clinic   Quantity:  1 mL   Refills:  0       predniSONE 10 MG tablet   Commonly known as:  DELTASONE   Used for:  Wheezing   Started by:  Norma Gilbert PA-C        Dose:  10 mg   Take 1 tablet (10 mg) by mouth daily for 5 days   Quantity:  5 tablet   Refills:  0            Where to get your medicines      These medications were sent to [facility] [address]  [address] [facility] [address]     Phone:  [phone]     albuterol 108 (90 Base) MCG/ACT Inhaler    predniSONE 10 MG tablet         Some of these will need a paper prescription and others can be bought over the counter.  Ask your nurse if you have questions.     You don't need a prescription for these medications     levalbuterol 1.25 MG/3ML neb solution                Primary Care Provider Office Phone # Fax #    Sowmya Tinajero PA-C [phone]       [address]  [facility] [address]        Equal Access to Services     CHARLEE TAVARES AH: Hadii gail johnsono Soallyali, waaxda luqadaha, qaybta kaalmada adeegyada, prosper prater. So [facility] [phone].    ATENCIÓN: Si habla español, tiene a varner disposición servicios gratuitos de asistencia lingüística. Llame al [phone].    We comply with applicable federal civil rights laws and [place] laws. We do not discriminate on the basis of race, color, national origin, age, disability, sex, sexual orientation, or gender identity.            Thank you!     Thank you for choosing [facility]  for your care. Our goal is always to provide you with excellent care. Hearing back from our patients is one way we can continue to improve our services. Please take a few  minutes to complete the written survey that you may receive in the mail after your visit with us. Thank you!             Your Updated Medication List - Protect others around you: Learn how to safely use, store and throw away your medicines at www.disposemymeds.org.          This list is accurate as of 4/18/18  1:45 PM.  Always use your most recent med list.                   Brand Name Dispense Instructions for use Diagnosis    albuterol 108 (90 Base) MCG/ACT Inhaler    PROAIR HFA/PROVENTIL HFA/VENTOLIN HFA    1 Inhaler    Inhale 2 puffs into the lungs every 6 hours as needed for shortness of breath / dyspnea or wheezing    Wheezing       aspirin 81 MG tablet      Take 81 mg by mouth daily        atorvastatin 40 MG tablet    LIPITOR    90 tablet    Take 1 tablet (40 mg) by mouth daily    CARDIOVASCULAR SCREENING; LDL GOAL LESS THAN 160       fish oil-omega-3 fatty acids 1000 MG capsule      Take 2 g by mouth daily        fluticasone 50 MCG/ACT spray    FLONASE    16 g    Spray 2 sprays into both nostrils daily    PND (post-nasal drip)       levalbuterol 1.25 MG/3ML neb solution    XOPENEX    1 mL    One nebulizer treatment in clinic    Wheezing       MAGNESIUM OXIDE PO      Take by mouth daily        predniSONE 10 MG tablet    DELTASONE    5 tablet    Take 1 tablet (10 mg) by mouth daily for 5 days    Wheezing       VESICARE PO      Take 5 mg by mouth daily        VITAMIN D3 PO      Take by mouth daily

## 2018-04-18 NOTE — TELEPHONE ENCOUNTER
Pt's urologist ordered vesicare.  Took for 1 week.  Monday, sore throat and feels like trouble breathing.  Cough.  Has increased SOB.  PT has not taken vesicare today.  PT is talking fine, but doesn't feel well.    I recommend going to . Gave info on [facility].  Pt should be seen this am.  Pt was not happy that there were not locations or appts near clinic.  Family will drive pt.  FRANCESCO Cruz

## 2018-04-20 ENCOUNTER — OFFICE VISIT (OUTPATIENT)
Dept: FAMILY MEDICINE | Facility: CLINIC | Age: 68
End: 2018-04-20
Payer: COMMERCIAL

## 2018-04-20 VITALS
HEART RATE: 72 BPM | DIASTOLIC BLOOD PRESSURE: 76 MMHG | RESPIRATION RATE: 16 BRPM | OXYGEN SATURATION: 96 % | SYSTOLIC BLOOD PRESSURE: 125 MMHG | TEMPERATURE: 97.5 F

## 2018-04-20 DIAGNOSIS — J40 BRONCHITIS: Primary | ICD-10-CM

## 2018-04-20 DIAGNOSIS — Z87.891 PERSONAL HISTORY OF TOBACCO USE, PRESENTING HAZARDS TO HEALTH: ICD-10-CM

## 2018-04-20 PROCEDURE — 99213 OFFICE O/P EST LOW 20 MIN: CPT | Performed by: PHYSICIAN ASSISTANT

## 2018-04-20 NOTE — PATIENT INSTRUCTIONS
Continue previous prescription given by UC.  Continue to stop Vesicare and follow up with urology as scheduled.  Return to clinic for nurse only visit for PFT/spirometry in a few weeks.    Return to clinic with any worsening or changes in symptoms and follow up with PCP for routine care.

## 2018-04-20 NOTE — MR AVS SNAPSHOT
After Visit Summary   4/20/2018    Ciera Casas    MRN: [id number]           Patient Information     Date Of Birth          1950        Visit Information        Provider Department      4/20/2018 9:20 AM Sowmya Tinajero PA-C [facility]        Today's Diagnoses     Bronchitis    -  1    Personal history of tobacco use, presenting hazards to health          Care Instructions    Continue previous prescription given by .  Continue to stop Vesicare and follow up with urology as scheduled.  Return to clinic for nurse only visit for PFT/spirometry in a few weeks.    Return to clinic with any worsening or changes in symptoms and follow up with PCP for routine care.           Follow-ups after your visit        Future tests that were ordered for you today     Open Future Orders        Priority Expected Expires Ordered    Spirometry, Breathing Capacity: Normal Order, Clinic Performed Routine  6/4/2018 4/20/2018            Who to contact     If you have questions or need follow up information about today's clinic visit or your schedule please contact [facility] directly at [phone].  Normal or non-critical lab and imaging results will be communicated to you by Endgamehart, letter or phone within 4 business days after the clinic has received the results. If you do not hear from us within 7 days, please contact the clinic through Endgamehart or phone. If you have a critical or abnormal lab result, we will notify you by phone as soon as possible.  Submit refill requests through [employer] or call your pharmacy and they will forward the refill request to us. Please allow 3 business days for your refill to be completed.          Additional Information About Your Visit        Endgamehart Information     [employer] lets you send messages to your doctor, view your test results, renew your prescriptions, schedule appointments and more. To sign up, go to [email or web address] . Click on  "\"Log in\" on the left side of the screen, which will take you to the Welcome page. Then click on \"Sign up Now\" on the right side of the page.     You will be asked to enter the access code listed below, as well as some personal information. Please follow the directions to create your username and password.     Your access code is: [email or web address]  Expires: 2018 12:13 PM     Your access code will  in 90 days. If you need help or a new code, please call your [place] clinic or [phone].        Care EveryWhere ID     This is your Care EveryWhere ID. This could be used by other organizations to access your [place] medical records  [id number]        Your Vitals Were     Pulse Temperature Respirations Pulse Oximetry          72 97.5  F (36.4  C) (Oral) 16 96%         Blood Pressure from Last 3 Encounters:   18 125/76   18 130/82   18 105/72    Weight from Last 3 Encounters:   18 185 lb 1 oz (83.9 kg)   18 186 lb (84.4 kg)   17 175 lb (79.4 kg)               Primary Care Provider Office Phone # Fax #    Sowmya Tinajero PA-C [phone] [address] [place] [address]        Equal Access to Services     CHARLEE TAVARES : Hadii gail johnsono Soedin, waaxda luqadaha, qaybta kaalmada gemini, prosper verma . So [facility] [phone].    ATENCIÓN: Si habla español, tiene a varner disposición servicios gratuitos de asistencia lingüística. Mil al [phone].    We comply with applicable federal civil rights laws and [place] laws. We do not discriminate on the basis of race, color, national origin, age, disability, sex, sexual orientation, or gender identity.            Thank you!     Thank you for choosing [facility]  for your care. Our goal is always to provide you with excellent care. Hearing back from our patients is one way we can continue to improve our services. Please take a few minutes to " complete the written survey that you may receive in the mail after your visit with us. Thank you!             Your Updated Medication List - Protect others around you: Learn how to safely use, store and throw away your medicines at www.disposemadRiseeds.org.          This list is accurate as of 4/20/18  9:38 AM.  Always use your most recent med list.                   Brand Name Dispense Instructions for use Diagnosis    albuterol 108 (90 Base) MCG/ACT Inhaler    PROAIR HFA/PROVENTIL HFA/VENTOLIN HFA    1 Inhaler    Inhale 2 puffs into the lungs every 6 hours as needed for shortness of breath / dyspnea or wheezing    Wheezing       aspirin 81 MG tablet      Take 81 mg by mouth daily        atorvastatin 40 MG tablet    LIPITOR    90 tablet    Take 1 tablet (40 mg) by mouth daily    CARDIOVASCULAR SCREENING; LDL GOAL LESS THAN 160       fish oil-omega-3 fatty acids 1000 MG capsule      Take 2 g by mouth daily        fluticasone 50 MCG/ACT spray    FLONASE    16 g    Spray 2 sprays into both nostrils daily    PND (post-nasal drip)       levalbuterol 1.25 MG/3ML neb solution    XOPENEX    1 mL    One nebulizer treatment in clinic    Wheezing       MAGNESIUM OXIDE PO      Take by mouth daily        predniSONE 10 MG tablet    DELTASONE    5 tablet    Take 1 tablet (10 mg) by mouth daily for 5 days    Wheezing       VESICARE PO      Take 5 mg by mouth daily        VITAMIN D3 PO      Take by mouth daily

## 2018-04-20 NOTE — PROGRESS NOTES
SUBJECTIVE:   Ciera Casas is a 67 year old female who presents to clinic today for the following health issues:      ED/UC Followup:    Facility:  [facility]  Date of visit: 4/18/18  Reason for visit: cough and wheezing   Current Status: still coughing and slight wheeze but feels a little better   Normal chest xray.  Given rescue inhaler (albuterol) and oral prednisone x 5 days  May have been allergic reaction to Vesicare, similar to shortness of breath/wheeze with oxybutyin (ditropan) vs viral bronchitis         Problem list and histories reviewed & adjusted, as indicated.  Additional history: as documented    Patient Active Problem List   Diagnosis     CARDIOVASCULAR SCREENING; LDL GOAL LESS THAN 160     Overweight     Abnormal blood sugar     Closed fracture of middle or proximal phalanx or phalanges of hand     Stiffness of joint, not elsewhere classified, hand     Pain in joint, hand     Other postprocedural status(V45.89)     Depression with anxiety     Atherosclerotic plaque     Chronic right shoulder pain     Past Surgical History:   Procedure Laterality Date     APPENDECTOMY       BACK SURGERY       C NONSPECIFIC PROCEDURE  1992    laminectomy     CLOSED REDUCTION, PERCUTANEOUS PINNING FINGER, COMBINED  4/6/2012    Procedure:COMBINED CLOSED REDUCTION, PERCUTANEOUS PINNING FINGER; Right Ring Finger Proximal Fracture Closed Reduction Internal Fixation, percutaneous pinning  Choice anesthesia; Surgeon:MILDRED DICKSON; Location: OR       Social History   Substance Use Topics     Smoking status: Former Smoker     Packs/day: 0.50     Years: 30.00     Types: Cigarettes     Quit date: 2/1/2009     Smokeless tobacco: Never Used     Alcohol use Yes      Comment: a couple of drinks on the weekend     Family History   Problem Relation Age of Onset     CANCER Mother      lung     Respiratory Mother      Emphysema     CANCER Father      esophagus     OSTEOPOROSIS Paternal Grandmother      Lipids Paternal Grandmother       Hypertension Maternal Aunt      Alzheimer Disease Other      fathers mother sister     Connective Tissue Disorder Other      lupus cousin     Depression Maternal Aunt      Respiratory Paternal Aunt      Asthma     Respiratory Other      neice     Respiratory Other      Asthma second cousin         Current Outpatient Prescriptions   Medication Sig Dispense Refill     albuterol (PROAIR HFA/PROVENTIL HFA/VENTOLIN HFA) 108 (90 Base) MCG/ACT Inhaler Inhale 2 puffs into the lungs every 6 hours as needed for shortness of breath / dyspnea or wheezing 1 Inhaler 0     aspirin 81 MG tablet Take 81 mg by mouth daily       atorvastatin (LIPITOR) 40 MG tablet Take 1 tablet (40 mg) by mouth daily 90 tablet 3     Cholecalciferol (VITAMIN D3 PO) Take by mouth daily       fish oil-omega-3 fatty acids 1000 MG capsule Take 2 g by mouth daily       levalbuterol (XOPENEX) 1.25 MG/3ML neb solution One nebulizer treatment in clinic 1 mL 0     MAGNESIUM OXIDE PO Take by mouth daily       predniSONE (DELTASONE) 10 MG tablet Take 1 tablet (10 mg) by mouth daily for 5 days 5 tablet 0     Solifenacin Succinate (VESICARE PO) Take 5 mg by mouth daily       fluticasone (FLONASE) 50 MCG/ACT spray Spray 2 sprays into both nostrils daily (Patient not taking: Reported on 4/18/2018) 16 g 3     Allergies   Allergen Reactions     No Known Drug Allergies      Oxybutynin Difficulty breathing       Reviewed and updated as needed this visit by clinical staff  Tobacco  Allergies  Meds  Problems  Med Hx  Surg Hx  Fam Hx  Soc Hx        Reviewed and updated as needed this visit by Provider  Allergies  Meds  Problems         ROS:  Constitutional, HEENT, cardiovascular, pulmonary, GI, , musculoskeletal, neuro, skin, endocrine and psych systems are negative, except as otherwise noted.    OBJECTIVE:     /76 (BP Location: Left arm, Patient Position: Sitting, Cuff Size: Adult Large)  Pulse 72  Temp 97.5  F (36.4  C) (Oral)  Resp 16  SpO2  96%  There is no height or weight on file to calculate BMI.  GENERAL: healthy, alert and no distress  EYES: Eyes grossly normal to inspection, PERRL and conjunctivae and sclerae normal  HENT: ear canals and TM's normal, nose and mouth without ulcers or lesions  NECK: no adenopathy, no asymmetry, masses, or scars and thyroid normal to palpation  RESP: lungs clear to auscultation - no rales, rhonchi or wheezes  CV: regular rate and rhythm, normal S1 S2, no S3 or S4, no murmur, click or rub, no peripheral edema and peripheral pulses strong  PSYCH: mentation appears normal, affect normal/bright    Diagnostic Test Results:  none     ASSESSMENT/PLAN:       ICD-10-CM    1. Bronchitis J40 Spirometry, Breathing Capacity: Normal Order, Clinic Performed   2. Personal history of tobacco use, presenting hazards to health Z87.891 Spirometry, Breathing Capacity: Normal Order, Clinic Performed       Patient Instructions   Continue previous prescription given by .  Continue to stop Vesicare and follow up with urology as scheduled.  Return to clinic for nurse only visit for PFT/spirometry in a few weeks.    Return to clinic with any worsening or changes in symptoms and follow up with PCP for routine care.       Sowmya Tinajero PA-C  [facility]

## 2018-04-24 ENCOUNTER — TELEPHONE (OUTPATIENT)
Dept: FAMILY MEDICINE | Facility: CLINIC | Age: 68
End: 2018-04-24

## 2018-04-24 NOTE — TELEPHONE ENCOUNTER
Patient is calling just wanting Julio Tinajero to know that she is not going to proceed with seeing urologist regarding her urine, found out other stuff going on and at this time does not want to proceed with seeing provider at this time. She just wanted you to know this

## 2018-05-10 ENCOUNTER — ALLIED HEALTH/NURSE VISIT (OUTPATIENT)
Dept: NURSING | Facility: CLINIC | Age: 68
End: 2018-05-10
Payer: COMMERCIAL

## 2018-05-10 DIAGNOSIS — R06.02 SOB (SHORTNESS OF BREATH): Primary | ICD-10-CM

## 2018-05-10 LAB
FEF 25/75: NORMAL
FEV-1: NORMAL
FEV1/FVC: NORMAL
FVC: NORMAL

## 2018-05-10 PROCEDURE — 99207 ZZC NO CHARGE NURSE ONLY: CPT

## 2018-05-10 PROCEDURE — 94010 BREATHING CAPACITY TEST: CPT

## 2018-05-10 NOTE — MR AVS SNAPSHOT
"              After Visit Summary   5/10/2018    Ciera Casas    MRN: [id number]           Patient Information     Date Of Birth          1950        Visit Information        Provider Department      5/10/2018 10:00 AM  MEDICAL ASSISTANT [facility]        Today's Diagnoses     SOB (shortness of breath)    -  1       Follow-ups after your visit        Who to contact     If you have questions or need follow up information about today's clinic visit or your schedule please contact [facility] directly at [phone].  Normal or non-critical lab and imaging results will be communicated to you by Golden Gekkohart, letter or phone within 4 business days after the clinic has received the results. If you do not hear from us within 7 days, please contact the clinic through [employer] or phone. If you have a critical or abnormal lab result, we will notify you by phone as soon as possible.  Submit refill requests through [employer] or call your pharmacy and they will forward the refill request to us. Please allow 3 business days for your refill to be completed.          Additional Information About Your Visit        MyChart Information     Yellowsmith lets you send messages to your doctor, view your test results, renew your prescriptions, schedule appointments and more. To sign up, go to [email or web address] . Click on \"Log in\" on the left side of the screen, which will take you to the Welcome page. Then click on \"Sign up Now\" on the right side of the page.     You will be asked to enter the access code listed below, as well as some personal information. Please follow the directions to create your username and password.     Your access code is: [email or web address]  Expires: 2018 10:24 AM     Your access code will  in 90 days. If you need help or a new code, please call your [facility] or [phone].        Care EveryWhere ID     This is your Care EveryWhere ID. This could be used by other " organizations to access your [place] medical records  [id number]         Blood Pressure from Last 3 Encounters:   04/20/18 125/76   04/18/18 130/82   04/05/18 105/72    Weight from Last 3 Encounters:   04/18/18 185 lb 1 oz (83.9 kg)   02/08/18 186 lb (84.4 kg)   08/21/17 175 lb (79.4 kg)              We Performed the Following     Spirometry, Breathing Capacity: Normal Order, Clinic Performed        Primary Care Provider Office Phone # Fax #    Sowmya Tinajero PA-C [phone] [address]  [facility] [address]        Equal Access to Services     CHARLEE TAVARES : Hadii gail [place], waaxda luqadaha, qaybta kaalmada adeflor, prosper prater. So [facility] [phone].    ATENCIÓN: Si habla español, tiene a varner disposición servicios gratuitos de asistencia lingüística. Llame al [phone].    We comply with applicable federal civil rights laws and [place] laws. We do not discriminate on the basis of race, color, national origin, age, disability, sex, sexual orientation, or gender identity.            Thank you!     Thank you for choosing [facility]  for your care. Our goal is always to provide you with excellent care. Hearing back from our patients is one way we can continue to improve our services. Please take a few minutes to complete the written survey that you may receive in the mail after your visit with us. Thank you!             Your Updated Medication List - Protect others around you: Learn how to safely use, store and throw away your medicines at www.disposemymeds.org.          This list is accurate as of 5/10/18 10:24 AM.  Always use your most recent med list.                   Brand Name Dispense Instructions for use Diagnosis    albuterol 108 (90 Base) MCG/ACT Inhaler    PROAIR HFA/PROVENTIL HFA/VENTOLIN HFA    1 Inhaler    Inhale 2 puffs into the lungs every 6 hours as needed for shortness of breath / dyspnea or  wheezing    Wheezing       aspirin 81 MG tablet      Take 81 mg by mouth daily        atorvastatin 40 MG tablet    LIPITOR    90 tablet    Take 1 tablet (40 mg) by mouth daily    CARDIOVASCULAR SCREENING; LDL GOAL LESS THAN 160       fish oil-omega-3 fatty acids 1000 MG capsule      Take 2 g by mouth daily        fluticasone 50 MCG/ACT spray    FLONASE    16 g    Spray 2 sprays into both nostrils daily    PND (post-nasal drip)       levalbuterol 1.25 MG/3ML neb solution    XOPENEX    1 mL    One nebulizer treatment in clinic    Wheezing       MAGNESIUM OXIDE PO      Take by mouth daily        VESICARE PO      Take 5 mg by mouth daily        VITAMIN D3 PO      Take by mouth daily

## 2018-05-10 NOTE — NURSING NOTE
Spirometry done. Julio Tinajero notified and she looked at results and gave patient result.    Janay Ewing, CMA

## 2018-06-29 ENCOUNTER — OFFICE VISIT (OUTPATIENT)
Dept: FAMILY MEDICINE | Facility: CLINIC | Age: 68
End: 2018-06-29
Payer: COMMERCIAL

## 2018-06-29 VITALS
HEART RATE: 70 BPM | SYSTOLIC BLOOD PRESSURE: 112 MMHG | DIASTOLIC BLOOD PRESSURE: 75 MMHG | TEMPERATURE: 98 F | HEIGHT: 64 IN | OXYGEN SATURATION: 96 %

## 2018-06-29 DIAGNOSIS — R23.8 PAPULE OF SKIN: Primary | ICD-10-CM

## 2018-06-29 PROCEDURE — 99213 OFFICE O/P EST LOW 20 MIN: CPT | Performed by: FAMILY MEDICINE

## 2018-06-29 RX ORDER — BENZOCAINE/MENTHOL 6 MG-10 MG
LOZENGE MUCOUS MEMBRANE
Qty: 30 G | Refills: 0 | Status: SHIPPED | OUTPATIENT
Start: 2018-06-29 | End: 2019-03-14

## 2018-06-29 NOTE — PROGRESS NOTES
"  SUBJECTIVE:   Ciera Casas is a 68 year old female who presents to clinic today for the following health issues:      For two to three weeks. She reads a lot and sometimes keeps her glasses on. She has noticed a lesion on the left inner eye. She notices that it is sore to the touch. She has applied neosporin. No pruritis, drainage, fever or chills. She feels that it is getting puffier and larger. She feels that discomfort is relieved with applying pressure on the sinuses. Pt doesn't wear sun screen.     Problem list and histories reviewed & adjusted, as indicated.  Additional history: as documented    Reviewed and updated as needed this visit by clinical staff       Reviewed and updated as needed this visit by Provider         ROS:  Constitutional, HEENT, cardiovascular, pulmonary, gi and gu systems are negative, except as otherwise noted.    OBJECTIVE:     /75  Pulse 70  Temp 98  F (36.7  C) (Oral)  Ht 5' 4\" (1.626 m)  SpO2 96%  There is no height or weight on file to calculate BMI.  GENERAL: healthy, alert and no distress  EYES: Eyes grossly normal to inspection  HENT:  nose and mouth without ulcers or lesions  MS: no gross musculoskeletal defects noted  SKIN: left ethmoidal region with erythematous papule     Diagnostic Test Results:  none     ASSESSMENT/PLAN:     ## Papule of skin  - due to irritation from possible glasses, will tx with hydrocortisone  - if not improving advised to schedule with dermatology for further evaluation  - advised to wear sun screen   - DERMATOLOGY REFERRAL  - hydrocortisone (CORTAID) 1 % cream; Apply sparingly to affected area three two daily for 14 days.  Dispense: 30 g; Refill: 0        Deqa Ale Morillo MD  [facility]  "

## 2018-06-29 NOTE — PATIENT INSTRUCTIONS
Apply hydrocortisone small amount two times daily for up to 14 days.   If not improving then please follow up with a dermatologist.   Please wear sun screen while out in the sun.

## 2018-06-29 NOTE — MR AVS SNAPSHOT
After Visit Summary   6/29/2018    Ciera Casas    MRN: [id number]           Patient Information     Date Of Birth          1950        Visit Information        Provider Department      6/29/2018 9:00 AM Luciana Morillo MD [facility]        Today's Diagnoses     Papule of skin    -  1      Care Instructions    Apply hydrocortisone small amount two times daily for up to 14 days.   If not improving then please follow up with a dermatologist.   Please wear sun screen while out in the sun.           Follow-ups after your visit        Additional Services     DERMATOLOGY REFERRAL       Your provider has referred you to: FMDEE: [facility] [phone]   [email or web address]    Please be aware that coverage of these services is subject to the terms and limitations of your health insurance plan.  Call member services at your health plan with any benefit or coverage questions.      Please bring the following with you to your appointment:    (1) Any X-Rays, CTs or MRIs which have been performed.  Contact the facility where they were done to arrange for  prior to your scheduled appointment.    (2) List of current medications  (3) This referral request   (4) Any documents/labs given to you for this referral                  Who to contact     If you have questions or need follow up information about today's clinic visit or your schedule please contact [facility] directly at [phone].  Normal or non-critical lab and imaging results will be communicated to you by MyChart, letter or phone within 4 business days after the clinic has received the results. If you do not hear from us within 7 days, please contact the clinic through MyChart or phone. If you have a critical or abnormal lab result, we will notify you by phone as soon as possible.  Submit refill requests through [employer] or call your pharmacy and  "they will forward the refill request to us. Please allow 3 business days for your refill to be completed.          Additional Information About Your Visit        Care EveryWhere ID     This is your Care EveryWhere ID. This could be used by other organizations to access your [place] medical records  [id number]        Your Vitals Were     Pulse Temperature Height Pulse Oximetry          70 98  F (36.7  C) (Oral) 5' 4\" (1.626 m) 96%         Blood Pressure from Last 3 Encounters:   06/29/18 112/75   04/20/18 125/76   04/18/18 130/82    Weight from Last 3 Encounters:   04/18/18 185 lb 1 oz (83.9 kg)   02/08/18 186 lb (84.4 kg)   08/21/17 175 lb (79.4 kg)              We Performed the Following     DERMATOLOGY REFERRAL          Today's Medication Changes          These changes are accurate as of 6/29/18  9:15 AM.  If you have any questions, ask your nurse or doctor.               Start taking these medicines.        Dose/Directions    hydrocortisone 1 % cream   Commonly known as:  CORTAID   Used for:  Papule of skin   Started by:  Luciana Morillo MD        Apply sparingly to affected area three two daily for 14 days.   Quantity:  30 g   Refills:  0            Where to get your medicines      These medications were sent to [facility] [address]  [address] [id number]     Phone:  [phone]     hydrocortisone 1 % cream                Primary Care Provider Office Phone # Fax #    Sowmya Tinajero PA-C [phone]       [address]  [facility] [address]        Equal Access to Services     [facility]: Hadii gail rosen hadasho Soedin, waaxda luqadaha, qaybta kaalmada prosper singletary. So [facility] [phone].    ATENCIÓN: Si habla español, tiene a varner disposición servicios gratuitos de asistencia lingüística. Llame al [phone].    We comply with applicable federal civil rights laws and " [place] laws. We do not discriminate on the basis of race, color, national origin, age, disability, sex, sexual orientation, or gender identity.            Thank you!     Thank you for choosing [facility]  for your care. Our goal is always to provide you with excellent care. Hearing back from our patients is one way we can continue to improve our services. Please take a few minutes to complete the written survey that you may receive in the mail after your visit with us. Thank you!             Your Updated Medication List - Protect others around you: Learn how to safely use, store and throw away your medicines at www.disposemymeds.org.          This list is accurate as of 6/29/18  9:15 AM.  Always use your most recent med list.                   Brand Name Dispense Instructions for use Diagnosis    aspirin 81 MG tablet      Take 81 mg by mouth daily        atorvastatin 40 MG tablet    LIPITOR    90 tablet    Take 1 tablet (40 mg) by mouth daily    CARDIOVASCULAR SCREENING; LDL GOAL LESS THAN 160       fish oil-omega-3 fatty acids 1000 MG capsule      Take 2 g by mouth daily        hydrocortisone 1 % cream    CORTAID    30 g    Apply sparingly to affected area three two daily for 14 days.    Papule of skin       VITAMIN D3 PO      Take by mouth daily

## 2018-09-02 ENCOUNTER — OFFICE VISIT (OUTPATIENT)
Dept: URGENT CARE | Facility: URGENT CARE | Age: 68
End: 2018-09-02
Payer: COMMERCIAL

## 2018-09-02 VITALS
OXYGEN SATURATION: 98 % | SYSTOLIC BLOOD PRESSURE: 130 MMHG | BODY MASS INDEX: 29.02 KG/M2 | HEIGHT: 64 IN | DIASTOLIC BLOOD PRESSURE: 78 MMHG | HEART RATE: 73 BPM | WEIGHT: 170 LBS | TEMPERATURE: 97 F

## 2018-09-02 DIAGNOSIS — M79.602 PAIN OF LEFT UPPER EXTREMITY: Primary | ICD-10-CM

## 2018-09-02 PROCEDURE — 99214 OFFICE O/P EST MOD 30 MIN: CPT | Performed by: PHYSICIAN ASSISTANT

## 2018-09-02 PROCEDURE — 93000 ELECTROCARDIOGRAM COMPLETE: CPT | Performed by: PHYSICIAN ASSISTANT

## 2018-09-02 NOTE — PATIENT INSTRUCTIONS
Follow up with PCP in the next week     Follow up in ER for RED FLAG symptoms:  Chest pain  Shortness of Breath  Weakness in arm

## 2018-09-02 NOTE — MR AVS SNAPSHOT
"              After Visit Summary   9/2/2018    Ciera Casas    MRN: [id number]           Patient Information     Date Of Birth          1950        Visit Information        Provider Department      9/2/2018 1:55 PM Bree Washington PA-C [facility]        Today's Diagnoses     Pain of left upper extremity    -  1      Care Instructions    Follow up with PCP in the next week     Follow up in ER for RED FLAG symptoms:  Chest pain  Shortness of Breath  Weakness in arm             Follow-ups after your visit        Who to contact     If you have questions or need follow up information about today's clinic visit or your schedule please contact [facility] directly at [phone].  Normal or non-critical lab and imaging results will be communicated to you by MyChart, letter or phone within 4 business days after the clinic has received the results. If you do not hear from us within 7 days, please contact the clinic through MyChart or phone. If you have a critical or abnormal lab result, we will notify you by phone as soon as possible.  Submit refill requests through [employer] or call your pharmacy and they will forward the refill request to us. Please allow 3 business days for your refill to be completed.          Additional Information About Your Visit        Care EveryWhere ID     This is your Care EveryWhere ID. This could be used by other organizations to access your [place] medical records  [id number]        Your Vitals Were     Pulse Temperature Height Pulse Oximetry BMI (Body Mass Index)       73 97  F (36.1  C) (Tympanic) 5' 4\" (1.626 m) 98% 29.18 kg/m2        Blood Pressure from Last 3 Encounters:   09/02/18 130/78   06/29/18 112/75   04/20/18 125/76    Weight from Last 3 Encounters:   09/02/18 170 lb (77.1 kg)   04/18/18 185 lb 1 oz (83.9 kg)   02/08/18 186 lb (84.4 kg)              We Performed the Following     EKG 12-lead complete w/read - Clinics  "       Primary Care Provider Office Phone # Fax #    Sowmya Tinajero PA-C [phone] [address]  [facility] [address]        Equal Access to Services     CHARLEE TAVARES : Hadii gail ku elizabetho Soomaali, waaxda luqadaha, qaybta kaalmada adeflor, prosper bardales laDanielcatalino prater. So [facility] [phone].    ATENCIÓN: Si habla español, tiene a varner disposición servicios gratuitos de asistencia lingüística. Llame al [phone].    We comply with applicable federal civil rights laws and [place] laws. We do not discriminate on the basis of race, color, national origin, age, disability, sex, sexual orientation, or gender identity.            Thank you!     Thank you for choosing [facility]  for your care. Our goal is always to provide you with excellent care. Hearing back from our patients is one way we can continue to improve our services. Please take a few minutes to complete the written survey that you may receive in the mail after your visit with us. Thank you!             Your Updated Medication List - Protect others around you: Learn how to safely use, store and throw away your medicines at www.disposemymeds.org.          This list is accurate as of 9/2/18  3:33 PM.  Always use your most recent med list.                   Brand Name Dispense Instructions for use Diagnosis    aspirin 81 MG tablet      Take 81 mg by mouth daily        atorvastatin 40 MG tablet    LIPITOR    90 tablet    Take 1 tablet (40 mg) by mouth daily    CARDIOVASCULAR SCREENING; LDL GOAL LESS THAN 160       fish oil-omega-3 fatty acids 1000 MG capsule      Take 2 g by mouth daily        hydrocortisone 1 % cream    CORTAID    30 g    Apply sparingly to affected area three two daily for 14 days.    Papule of skin       VITAMIN D3 PO      Take by mouth daily

## 2018-09-07 NOTE — PROGRESS NOTES
"SUBJECTIVE:  Chief Complaint   Patient presents with     Urgent Care     Forearm Pain     c/o forearm pain for 1 month off and on      Ciera Casas is a 68 year old female who presents with a chief complaint of left forearm pain and tingling. Patient notes that earlier today she had an intense pain and some tingling into her left arm. It lasted about 5 minutes, but during that time she felt sweaty. The area affected is from her elbow to her wrist. She states \"if feels like a blood pressure cuff it on my arm\" She denies having slurred speech or cognitive impairment. When the pain subsided, she was then asymptomatic. She has had this pain for the last 1-2 months and states that it comes on \"out of blue.\" When it occurred today she was sitting at her table. She is left handed. She is retired and is no longer working. She denies having and injury to the area. She denies having the pain waking her up at night. She denies having CP, SOB, nausea or feeling dizzy.   This has never happened to the patient before.  Cardiac Risk factors:  High lipids, EX- smoker    Past Medical History:   Diagnosis Date     Abnormal blood sugar      CARDIOVASCULAR SCREENING; LDL GOAL LESS THAN 160 5/9/2010     Depression with anxiety      Hepatitis, unspecified 4/2004     Major depression      Overweight(278.02)      Current Outpatient Prescriptions   Medication Sig Dispense Refill     aspirin 81 MG tablet Take 81 mg by mouth daily       atorvastatin (LIPITOR) 40 MG tablet Take 1 tablet (40 mg) by mouth daily 90 tablet 3     Cholecalciferol (VITAMIN D3 PO) Take by mouth daily       fish oil-omega-3 fatty acids 1000 MG capsule Take 2 g by mouth daily       hydrocortisone (CORTAID) 1 % cream Apply sparingly to affected area three two daily for 14 days. 30 g 0     Social History   Substance Use Topics     Smoking status: Former Smoker     Packs/day: 0.50     Years: 30.00     Types: Cigarettes     Quit date: 2/1/2009     Smokeless tobacco: Never " "Used     Alcohol use Yes      Comment: a couple of drinks on the weekend     Family History   Problem Relation Age of Onset     Cancer Mother      lung     Respiratory Mother      Emphysema     Cancer Father      esophagus     Osteoporosis Paternal Grandmother      Lipids Paternal Grandmother      Hypertension Maternal Aunt      Alzheimer Disease Other      fathers mother sister     Connective Tissue Disorder Other      lupus cousin     Depression Maternal Aunt      Respiratory Paternal Aunt      Asthma     Respiratory Other      neice     Respiratory Other      Asthma second cousin         ROS:  10 Review of systems negative except as stated above.    EXAM:   /78  Pulse 73  Temp 97  F (36.1  C) (Tympanic)  Ht 5' 4\" (1.626 m)  Wt 170 lb (77.1 kg)  SpO2 98%  BMI 29.18 kg/m2  M/S Exam:right forearm does not have erythema, swelling, tenderness to palpation OR decreased ROM. Tinnels / Phalen negative. Finklestein negative.   GENERAL APPEARANCE: healthy, alert and no distress  NECK: supple, non-tender to palpation, FROM   CHEST: clear to auscultation  CV: regular rate and rhythm  EXTREMITIES: peripheral pulses normal  SKIN: no suspicious lesions or rashes  NEURO: Normal strength and tone, sensory exam grossly normal, mentation intact, speech normal, gait normal, cranial nerves 2-12 intact and Romberg negative. No facial weakness noted.     X-RAY was not done.  EKG -- NSR, no ST segment elevation     ASSESSMENT / PLAN:  1. Pain of left upper extremity  68-year-old female presents with a 1-2 month history of periodic left arm tingling and pain.  Last episode happened this morning which prompted her visit to urgent care.  In clinic the episode had passed and she is completely asymptomatic.  Broad differential was considered including myocardial infarction, TIA,  and ulnar neuropathy. EKG and clinic is normal Sinus rhythm without T wave elevation. She does not have any cognitive impairment or changes while " episodes are happening or weakness in her arm, virtually ruling out TIA. I advised that if it occurs again today, that she be seen in the ED. Follow up with PCP for re-check in the upcoming week.   Likely secondary to nerve compression.   - EKG 12-lead complete w/read - Clinics    Bree Washington PA-C

## 2018-10-23 ENCOUNTER — TRANSFERRED RECORDS (OUTPATIENT)
Dept: HEALTH INFORMATION MANAGEMENT | Facility: CLINIC | Age: 68
End: 2018-10-23

## 2018-11-02 ENCOUNTER — ALLIED HEALTH/NURSE VISIT (OUTPATIENT)
Dept: NURSING | Facility: CLINIC | Age: 68
End: 2018-11-02
Payer: COMMERCIAL

## 2018-11-02 DIAGNOSIS — Z23 NEED FOR PROPHYLACTIC VACCINATION AND INOCULATION AGAINST INFLUENZA: Primary | ICD-10-CM

## 2018-11-02 PROCEDURE — 90662 IIV NO PRSV INCREASED AG IM: CPT

## 2018-11-02 PROCEDURE — G0008 ADMIN INFLUENZA VIRUS VAC: HCPCS

## 2018-11-02 NOTE — MR AVS SNAPSHOT
After Visit Summary   11/2/2018    Ciera Casas    MRN: [id number]           Patient Information     Date Of Birth          1950        Visit Information        Provider Department      11/2/2018 11:45 AM  FLU CLINIC NURSE [facility]        Today's Diagnoses     Need for prophylactic vaccination and inoculation against influenza    -  1       Follow-ups after your visit        Who to contact     If you have questions or need follow up information about today's clinic visit or your schedule please contact [facility] directly at [phone].  Normal or non-critical lab and imaging results will be communicated to you by MyChart, letter or phone within 4 business days after the clinic has received the results. If you do not hear from us within 7 days, please contact the clinic through MyChart or phone. If you have a critical or abnormal lab result, we will notify you by phone as soon as possible.  Submit refill requests through [employer] or call your pharmacy and they will forward the refill request to us. Please allow 3 business days for your refill to be completed.          Additional Information About Your Visit        Care EveryWhere ID     This is your Care EveryWhere ID. This could be used by other organizations to access your [place] medical records  [id number]         Blood Pressure from Last 3 Encounters:   09/02/18 130/78   06/29/18 112/75   04/20/18 125/76    Weight from Last 3 Encounters:   09/02/18 170 lb (77.1 kg)   04/18/18 185 lb 1 oz (83.9 kg)   02/08/18 186 lb (84.4 kg)              We Performed the Following     FLU VACCINE, INCREASED ANTIGEN, PRESV FREE, AGE 65+ [44344]     Vaccine Administration, Initial [[id number]]        Primary Care Provider Office Phone # Fax #    Sowmya Tinajero PA-C [phone] [address] [facility] [address]        Equal Access to Services     CHARLEE TAVARES AH: Fabiana davidson  Yara, wamumtazda luximenaadaha, rajeshta kacesar singletary, prosper ursulain hayaan bindevin breannjewels laDanielcatalino josi. So [facility] [phone].    ATENCIÓN: Si nadine benito, tiene a varner disposición servicios gratuitos de asistencia lingüística. Mil al [phone].    We comply with applicable federal civil rights laws and [place] laws. We do not discriminate on the basis of race, color, national origin, age, disability, sex, sexual orientation, or gender identity.            Thank you!     Thank you for choosing [facility]  for your care. Our goal is always to provide you with excellent care. Hearing back from our patients is one way we can continue to improve our services. Please take a few minutes to complete the written survey that you may receive in the mail after your visit with us. Thank you!             Your Updated Medication List - Protect others around you: Learn how to safely use, store and throw away your medicines at www.disposemymeds.org.          This list is accurate as of 11/2/18 11:53 AM.  Always use your most recent med list.                   Brand Name Dispense Instructions for use Diagnosis    aspirin 81 MG tablet      Take 81 mg by mouth daily        atorvastatin 40 MG tablet    LIPITOR    90 tablet    Take 1 tablet (40 mg) by mouth daily    CARDIOVASCULAR SCREENING; LDL GOAL LESS THAN 160       fish oil-omega-3 fatty acids 1000 MG capsule      Take 2 g by mouth daily        hydrocortisone 1 % cream    CORTAID    30 g    Apply sparingly to affected area three two daily for 14 days.    Papule of skin       VITAMIN D3 PO      Take by mouth daily

## 2018-11-02 NOTE — PROGRESS NOTES

## 2019-01-28 ENCOUNTER — TRANSFERRED RECORDS (OUTPATIENT)
Dept: HEALTH INFORMATION MANAGEMENT | Facility: CLINIC | Age: 69
End: 2019-01-28

## 2019-01-31 ENCOUNTER — OFFICE VISIT (OUTPATIENT)
Dept: FAMILY MEDICINE | Facility: CLINIC | Age: 69
End: 2019-01-31
Payer: COMMERCIAL

## 2019-01-31 VITALS
HEART RATE: 90 BPM | OXYGEN SATURATION: 96 % | TEMPERATURE: 97.6 F | DIASTOLIC BLOOD PRESSURE: 82 MMHG | SYSTOLIC BLOOD PRESSURE: 134 MMHG | RESPIRATION RATE: 18 BRPM

## 2019-01-31 DIAGNOSIS — I70.90 ATHEROSCLEROTIC PLAQUE: ICD-10-CM

## 2019-01-31 DIAGNOSIS — I25.10 CORONARY ARTERY DISEASE INVOLVING NATIVE CORONARY ARTERY OF NATIVE HEART WITHOUT ANGINA PECTORIS: ICD-10-CM

## 2019-01-31 DIAGNOSIS — R42 DIZZINESS: Primary | ICD-10-CM

## 2019-01-31 PROCEDURE — 99213 OFFICE O/P EST LOW 20 MIN: CPT | Performed by: PHYSICIAN ASSISTANT

## 2019-01-31 NOTE — PROGRESS NOTES
SUBJECTIVE:   Ciera Casas is a 68 year old female who presents to clinic today for the following health issues:      ED/UC Followup:    Facility:  [facility]  Date of visit: 1/28/19 and 1/30/19  Reason for visit: dizziness and lightheaded   Current Status: Feeling better      History of CAD with a few clogged arteries - suggested cardiology follow up.  Patient taking baby asa and lipitor 40 mg daily.  If dizziness continues may need vestibular therapy vs ENT referral.  Has not filled meclizine yet - was wondering about options.      Problem list and histories reviewed & adjusted, as indicated.  Additional history: as documented    Patient Active Problem List   Diagnosis     CARDIOVASCULAR SCREENING; LDL GOAL LESS THAN 160     Overweight     Abnormal blood sugar     Closed fracture of middle or proximal phalanx or phalanges of hand     Stiffness of joint, not elsewhere classified, hand     Pain in joint, hand     Other postprocedural status(V45.89)     Depression with anxiety     Atherosclerotic plaque     Chronic right shoulder pain     Coronary artery disease involving native coronary artery of native heart without angina pectoris     Past Surgical History:   Procedure Laterality Date     APPENDECTOMY       BACK SURGERY       C NONSPECIFIC PROCEDURE  1992    laminectomy     CLOSED REDUCTION, PERCUTANEOUS PINNING FINGER, COMBINED  4/6/2012    Procedure:COMBINED CLOSED REDUCTION, PERCUTANEOUS PINNING FINGER; Right Ring Finger Proximal Fracture Closed Reduction Internal Fixation, percutaneous pinning  Choice anesthesia; Surgeon:MILDRED DICKSNO; Location: OR       Social History     Tobacco Use     Smoking status: Former Smoker     Packs/day: 0.50     Years: 30.00     Pack years: 15.00     Types: Cigarettes     Last attempt to quit: 2/1/2009     Years since quitting: 10.0     Smokeless tobacco: Never Used   Substance Use Topics     Alcohol use: Yes     Comment: a couple of drinks on the weekend     Family History    Problem Relation Age of Onset     Cancer Mother         lung     Respiratory Mother         Emphysema     Cancer Father         esophagus     Osteoporosis Paternal Grandmother      Lipids Paternal Grandmother      Hypertension Maternal Aunt      Alzheimer Disease Other         fathers mother sister     Connective Tissue Disorder Other         lupus cousin     Depression Maternal Aunt      Respiratory Paternal Aunt         Asthma     Respiratory Other         neice     Respiratory Other         Asthma second cousin         Current Outpatient Medications   Medication Sig Dispense Refill     aspirin 81 MG tablet Take 81 mg by mouth daily       atorvastatin (LIPITOR) 40 MG tablet Take 1 tablet (40 mg) by mouth daily 90 tablet 3     Cholecalciferol (VITAMIN D3 PO) Take by mouth daily       fish oil-omega-3 fatty acids 1000 MG capsule Take 2 g by mouth daily       hydrocortisone (CORTAID) 1 % cream Apply sparingly to affected area three two daily for 14 days. 30 g 0     Allergies   Allergen Reactions     No Known Drug Allergies      Oxybutynin Difficulty breathing     BP Readings from Last 3 Encounters:   01/31/19 134/82   09/02/18 130/78   06/29/18 112/75    Wt Readings from Last 3 Encounters:   09/02/18 77.1 kg (170 lb)   04/18/18 83.9 kg (185 lb 1 oz)   02/08/18 84.4 kg (186 lb)                  Labs reviewed in EPIC    Reviewed and updated as needed this visit by clinical staff  Tobacco  Allergies  Meds  Problems  Med Hx  Surg Hx  Fam Hx  Soc Hx        Reviewed and updated as needed this visit by Provider  Tobacco  Allergies  Meds  Problems  Med Hx  Surg Hx  Fam Hx         ROS:  Constitutional, HEENT, cardiovascular, pulmonary, GI, , musculoskeletal, neuro, skin, endocrine and psych systems are negative, except as otherwise noted.    OBJECTIVE:     /82 (BP Location: Left arm, Patient Position: Sitting, Cuff Size: Adult Regular)   Pulse 90   Temp 97.6  F (36.4  C) (Oral)   Resp 18   SpO2 96%    There is no height or weight on file to calculate BMI.  GENERAL: healthy, alert and no distress  EYES: Eyes grossly normal to inspection, PERRL and conjunctivae and sclerae normal  HENT: ear canals and TM's normal, nose and mouth without ulcers or lesions  RESP: lungs clear to auscultation - no rales, rhonchi or wheezes  CV: regular rate and rhythm, normal S1 S2, no S3 or S4, no murmur, click or rub, no peripheral edema and peripheral pulses strong  NEURO: Normal strength and tone, mentation intact and speech normal  PSYCH: mentation appears normal, affect normal/bright    Diagnostic Test Results:  none     ASSESSMENT/PLAN:       ICD-10-CM    1. Dizziness R42 PHYSICAL THERAPY REFERRAL   2. Coronary artery disease involving native coronary artery of native heart without angina pectoris I25.10    3. Atherosclerotic plaque I70.90        Patient Instructions       Patient Education     Managing Dizziness (Vertigo) with Medicines    Although medicines can't cure your problem, they can help control symptoms. Your doctor may prescribe medicines for a few weeks and then taper them off. Always take your medicine as prescribed. Never share your medicine with others.  Contact your healthcare provider right away if you have side effects from your medicines.   How medicines can help    Treat infection or inflammation. If you have an infection caused by bacteria, your doctor can prescribe antibiotics.    Limit conflicting balance signals. These medicines are often in pill form.    Ease nausea. Suppositories, pills, or shots can reduce vomiting.    Reduce pressure in the canals. Diuretics can be used to treat Meniere's disease. These medicines help your body get rid of extra fluid.    Ease other symptoms. Other medicines can help ease depression and anxiety caused by living with dizziness or fainting.  Date Last Reviewed: 11/1/2016 2000-2018 [employer]. [address] [place] [address], [place] [address]. All rights  reserved. This information is not intended as a substitute for professional medical care. Always follow your healthcare professional's instructions.           Patient Education     Understanding Dizziness, Balance Problems, and Fainting     The eyes, inner ear, joints, and muscles send signals to the brain to achieve balance.     Balance is a group effort of the eyes, inner ear, joints, and muscles. They each send signals to the brain about body position and head movement. Then the brain uses this information to achieve balance. When the brain receives conflicting signals, or when there is a problem with blood flow, dizziness or fainting can happen.  Vertigo  Vertigo is the feeling of spinning. It may happen if the brain receives conflicting balance signals. Vertigo is often caused by a problem in the inner ear. Problems include changes in inner ear structures, infection, swelling, or excess fluid. Sometimes vertigo is due to a brain problem, such as migraine, stroke, or tumor.  Dysequilibrium  Dysequilibrium is the feeling of imbalance without a sense of spinning. It may happen if the signal path between the body and brain is disrupted. There are many causes of dysequilibrium, including diabetes, anemia, head injury, and aging.  Syncope  Syncope is losing consciousness or fainting. The brain needs oxygen-rich blood to function. The heart pumps that blood to the brain. If there is a problem with the heart, blood flow (such as low blood pressure), or blood vessels, you may faint.  Date Last Reviewed: 5/1/2018 2000-2018 [employer]. [address] [place] [address], [place] [address]. All rights reserved. This information is not intended as a substitute for professional medical care. Always follow your healthcare professional's instructions.           Sowmya Tinajero PA-C  [facility]

## 2019-01-31 NOTE — PATIENT INSTRUCTIONS
Patient Education     Managing Dizziness (Vertigo) with Medicines    Although medicines can't cure your problem, they can help control symptoms. Your doctor may prescribe medicines for a few weeks and then taper them off. Always take your medicine as prescribed. Never share your medicine with others.  Contact your healthcare provider right away if you have side effects from your medicines.   How medicines can help    Treat infection or inflammation. If you have an infection caused by bacteria, your doctor can prescribe antibiotics.    Limit conflicting balance signals. These medicines are often in pill form.    Ease nausea. Suppositories, pills, or shots can reduce vomiting.    Reduce pressure in the canals. Diuretics can be used to treat Meniere's disease. These medicines help your body get rid of extra fluid.    Ease other symptoms. Other medicines can help ease depression and anxiety caused by living with dizziness or fainting.  Date Last Reviewed: 11/1/2016 2000-2018 [employer]. [address] [place] [address]. All rights reserved. This information is not intended as a substitute for professional medical care. Always follow your healthcare professional's instructions.           Patient Education     Understanding Dizziness, Balance Problems, and Fainting     The eyes, inner ear, joints, and muscles send signals to the brain to achieve balance.     Balance is a group effort of the eyes, inner ear, joints, and muscles. They each send signals to the brain about body position and head movement. Then the brain uses this information to achieve balance. When the brain receives conflicting signals, or when there is a problem with blood flow, dizziness or fainting can happen.  Vertigo  Vertigo is the feeling of spinning. It may happen if the brain receives conflicting balance signals. Vertigo is often caused by a problem in the inner ear. Problems include changes in inner ear structures,  infection, swelling, or excess fluid. Sometimes vertigo is due to a brain problem, such as migraine, stroke, or tumor.  Dysequilibrium  Dysequilibrium is the feeling of imbalance without a sense of spinning. It may happen if the signal path between the body and brain is disrupted. There are many causes of dysequilibrium, including diabetes, anemia, head injury, and aging.  Syncope  Syncope is losing consciousness or fainting. The brain needs oxygen-rich blood to function. The heart pumps that blood to the brain. If there is a problem with the heart, blood flow (such as low blood pressure), or blood vessels, you may faint.  Date Last Reviewed: 5/1/2018 2000-2018 [employer]. [address] [place] [address], [place] [address]. All rights reserved. This information is not intended as a substitute for professional medical care. Always follow your healthcare professional's instructions.

## 2019-02-19 ENCOUNTER — TELEPHONE (OUTPATIENT)
Dept: FAMILY MEDICINE | Facility: CLINIC | Age: 69
End: 2019-02-19

## 2019-02-19 DIAGNOSIS — Z13.6 CARDIOVASCULAR SCREENING; LDL GOAL LESS THAN 160: ICD-10-CM

## 2019-02-19 RX ORDER — ATORVASTATIN CALCIUM 40 MG/1
TABLET, FILM COATED ORAL
Qty: 30 TABLET | Refills: 0 | Status: SHIPPED | OUTPATIENT
Start: 2019-02-19 | End: 2019-02-25

## 2019-02-19 NOTE — TELEPHONE ENCOUNTER
"Last Written Prescription Date:  2/8/18  Last Fill Quantity: 90,  # refills: 3   Last office visit: 1/31/2019 with prescribing provider:  1/31/19   Future Office Visit:  .  Requested Prescriptions   Pending Prescriptions Disp Refills     atorvastatin (LIPITOR) 40 MG tablet [Pharmacy Med Name: ATORVASTATIN CALCIUM 40MG TABS] 90 tablet 3     Sig: TAKE ONE TABLET BY MOUTH EVERY DAY    Statins Protocol Failed - 2/19/2019  3:42 PM       Failed - LDL on file in past 12 months    Recent Labs   Lab Test 02/08/18  1149   LDL 65            Passed - No abnormal creatine kinase in past 12 months    No lab results found.            Passed - Recent (12 mo) or future (30 days) visit within the authorizing provider's specialty    Patient had office visit in the last 12 months or has a visit in the next 30 days with authorizing provider or within the authorizing provider's specialty.  See \"Patient Info\" tab in inbasket, or \"Choose Columns\" in Meds & Orders section of the refill encounter.             Passed - Medication is active on med list       Passed - Patient is age 18 or older       Passed - No active pregnancy on record       Passed - No positive pregnancy test in past 12 months          "

## 2019-02-19 NOTE — TELEPHONE ENCOUNTER
Reason for Call:  Medication or medication refill:    Do you use a [facility]?  Name of the pharmacy and phone number for the current request:  [facility] [place] - [phone]    Name of the medication requested: atorvastatin (LIPITOR) 40 MG tablet    Other request: Please call pt when medication has been sent    Can we leave a detailed message on this number? YES    Phone number patient can be reached at: Home number on file [phone] (home)    Best Time: anytime    Call taken on 2/19/2019 at 3:51 PM by Valerie Patel

## 2019-02-19 NOTE — TELEPHONE ENCOUNTER
"Pended 1 month supply  Patient has not had monitoring labs within the past year.  Lab order pended as future.  Kimberley Denise RN    Please call patient to schedule fasting labs.  Kimberley Denise RN    Last Written Prescription Date:  2/8/18  Last Fill Quantity: 90,  # refills: 3   Last office visit: 1/31/2019 with prescribing provider:     Future Office Visit:    Requested Prescriptions   Pending Prescriptions Disp Refills     atorvastatin (LIPITOR) 40 MG tablet [Pharmacy Med Name: ATORVASTATIN CALCIUM 40MG TABS] 30 tablet 0     Sig: TAKE ONE TABLET BY MOUTH EVERY DAY    Statins Protocol Failed - 2/19/2019  3:42 PM       Failed - LDL on file in past 12 months    Recent Labs   Lab Test 02/08/18  1149   LDL 65            Passed - No abnormal creatine kinase in past 12 months    No lab results found.            Passed - Recent (12 mo) or future (30 days) visit within the authorizing provider's specialty    Patient had office visit in the last 12 months or has a visit in the next 30 days with authorizing provider or within the authorizing provider's specialty.  See \"Patient Info\" tab in inbasket, or \"Choose Columns\" in Meds & Orders section of the refill encounter.             Passed - Medication is active on med list       Passed - Patient is age 18 or older       Passed - No active pregnancy on record       Passed - No positive pregnancy test in past 12 months          "

## 2019-02-19 NOTE — LETTER
February 22, 2019      Ciera Casas  [address]  [facility] [address]        Dear Ciera,     In order to ensure we are providing the best quality care, we have reviewed your chart and see that you are due for:  1.   Fasting lab appointment to receive additional refills on Atorvastatin    Please call the clinic at your earliest convenience to schedule an appointment.    We greatly appreciate the opportunity to serve you.  Thank you for trusting us with your health care.    Your care team at [facility]        Sincerely,        Sowmya Tinajero PA-C

## 2019-02-25 ENCOUNTER — OFFICE VISIT (OUTPATIENT)
Dept: FAMILY MEDICINE | Facility: CLINIC | Age: 69
End: 2019-02-25
Payer: COMMERCIAL

## 2019-02-25 VITALS
RESPIRATION RATE: 16 BRPM | DIASTOLIC BLOOD PRESSURE: 71 MMHG | OXYGEN SATURATION: 97 % | TEMPERATURE: 97.6 F | WEIGHT: 185 LBS | HEART RATE: 78 BPM | HEIGHT: 64 IN | BODY MASS INDEX: 31.58 KG/M2 | SYSTOLIC BLOOD PRESSURE: 123 MMHG

## 2019-02-25 DIAGNOSIS — Z13.6 CARDIOVASCULAR SCREENING; LDL GOAL LESS THAN 160: ICD-10-CM

## 2019-02-25 DIAGNOSIS — I25.10 CORONARY ARTERY DISEASE INVOLVING NATIVE CORONARY ARTERY OF NATIVE HEART WITHOUT ANGINA PECTORIS: Primary | ICD-10-CM

## 2019-02-25 DIAGNOSIS — F41.8 DEPRESSION WITH ANXIETY: ICD-10-CM

## 2019-02-25 PROCEDURE — 99213 OFFICE O/P EST LOW 20 MIN: CPT | Performed by: PHYSICIAN ASSISTANT

## 2019-02-25 PROCEDURE — 36415 COLL VENOUS BLD VENIPUNCTURE: CPT | Performed by: PHYSICIAN ASSISTANT

## 2019-02-25 PROCEDURE — 80061 LIPID PANEL: CPT | Performed by: PHYSICIAN ASSISTANT

## 2019-02-25 PROCEDURE — 80053 COMPREHEN METABOLIC PANEL: CPT | Performed by: PHYSICIAN ASSISTANT

## 2019-02-25 RX ORDER — ATORVASTATIN CALCIUM 40 MG/1
40 TABLET, FILM COATED ORAL DAILY
Qty: 90 TABLET | Refills: 3 | Status: SHIPPED | OUTPATIENT
Start: 2019-02-25 | End: 2020-03-16

## 2019-02-25 ASSESSMENT — PATIENT HEALTH QUESTIONNAIRE - PHQ9
SUM OF ALL RESPONSES TO PHQ QUESTIONS 1-9: 12
5. POOR APPETITE OR OVEREATING: SEVERAL DAYS

## 2019-02-25 ASSESSMENT — ANXIETY QUESTIONNAIRES
3. WORRYING TOO MUCH ABOUT DIFFERENT THINGS: SEVERAL DAYS
2. NOT BEING ABLE TO STOP OR CONTROL WORRYING: SEVERAL DAYS
6. BECOMING EASILY ANNOYED OR IRRITABLE: SEVERAL DAYS
7. FEELING AFRAID AS IF SOMETHING AWFUL MIGHT HAPPEN: SEVERAL DAYS
1. FEELING NERVOUS, ANXIOUS, OR ON EDGE: SEVERAL DAYS
IF YOU CHECKED OFF ANY PROBLEMS ON THIS QUESTIONNAIRE, HOW DIFFICULT HAVE THESE PROBLEMS MADE IT FOR YOU TO DO YOUR WORK, TAKE CARE OF THINGS AT HOME, OR GET ALONG WITH OTHER PEOPLE: SOMEWHAT DIFFICULT
GAD7 TOTAL SCORE: 7
5. BEING SO RESTLESS THAT IT IS HARD TO SIT STILL: SEVERAL DAYS

## 2019-02-25 ASSESSMENT — MIFFLIN-ST. JEOR: SCORE: 1354.15

## 2019-02-25 NOTE — LETTER
February 28, 2019      Ciera Casas  [address]  [facility] [address]        Dear Ciera,    The results of your recent labs are normal.    Results for orders placed or performed in visit on 02/25/19   Lipid panel reflex to direct LDL Fasting   Result Value Ref Range    Cholesterol 164 <200 mg/dL    Triglycerides 77 <150 mg/dL    HDL Cholesterol 71 >49 mg/dL    LDL Cholesterol Calculated 78 <100 mg/dL    Non HDL Cholesterol 93 <130 mg/dL   Comprehensive metabolic panel   Result Value Ref Range    Sodium 139 133 - 144 mmol/L    Potassium 4.8 3.4 - 5.3 mmol/L    Chloride 108 94 - 109 mmol/L    Carbon Dioxide 19 (L) 20 - 32 mmol/L    Anion Gap 12 3 - 14 mmol/L    Glucose 104 (H) 70 - 99 mg/dL    Urea Nitrogen 23 7 - 30 mg/dL    Creatinine 0.96 0.52 - 1.04 mg/dL    GFR Estimate 61 >60 mL/min/[1.73_m2]    GFR Estimate If Black 70 >60 mL/min/[1.73_m2]    Calcium 9.5 8.5 - 10.1 mg/dL    Bilirubin Total 0.5 0.2 - 1.3 mg/dL    Albumin 4.0 3.4 - 5.0 g/dL    Protein Total 7.4 6.8 - 8.8 g/dL    Alkaline Phosphatase 82 40 - 150 U/L    ALT 34 0 - 50 U/L    AST 24 0 - 45 U/L       Sincerely,    Sowmya Tinajero PA-C/ludin

## 2019-02-25 NOTE — PROGRESS NOTES
SUBJECTIVE:   Ciera Casas is a 68 year old female who presents to clinic today for the following health issues:    Hyperlipidemia Follow-Up      Rate your low fat/cholesterol diet?: fair    Taking statin?  Yes, no muscle aches from statin    Other lipid medications/supplements?:  Fish oil/Omega 3, without side effects      Amount of exercise or physical activity: None    Problems taking medications regularly: No    Medication side effects: none    Diet: regular (no restrictions)    Lots of stressors with her daughter and partner lately and worried about he heart and health in general.    Seen in ED for dizziness previously; had paramedics come out 2/23/2019 for some shortness of breath and palpitations - within normal limits.      Problem list and histories reviewed & adjusted, as indicated.  Additional history: as documented    Patient Active Problem List   Diagnosis     CARDIOVASCULAR SCREENING; LDL GOAL LESS THAN 160     Overweight     Abnormal blood sugar     Closed fracture of middle or proximal phalanx or phalanges of hand     Stiffness of joint, not elsewhere classified, hand     Pain in joint, hand     Other postprocedural status(V45.89)     Depression with anxiety     Atherosclerotic plaque     Chronic right shoulder pain     Coronary artery disease involving native coronary artery of native heart without angina pectoris     Past Surgical History:   Procedure Laterality Date     APPENDECTOMY       BACK SURGERY       C NONSPECIFIC PROCEDURE  1992    laminectomy     CLOSED REDUCTION, PERCUTANEOUS PINNING FINGER, COMBINED  4/6/2012    Procedure:COMBINED CLOSED REDUCTION, PERCUTANEOUS PINNING FINGER; Right Ring Finger Proximal Fracture Closed Reduction Internal Fixation, percutaneous pinning  Choice anesthesia; Surgeon:MILDRED DICKSON; Location: OR       Social History     Tobacco Use     Smoking status: Former Smoker     Packs/day: 0.50     Years: 30.00     Pack years: 15.00     Types: Cigarettes      Last attempt to quit: 2/1/2009     Years since quitting: 10.0     Smokeless tobacco: Never Used   Substance Use Topics     Alcohol use: Yes     Comment: a couple of drinks on the weekend     Family History   Problem Relation Age of Onset     Cancer Mother         lung     Respiratory Mother         Emphysema     Cancer Father         esophagus     Osteoporosis Paternal Grandmother      Lipids Paternal Grandmother      Hypertension Maternal Aunt      Alzheimer Disease Other         fathers mother sister     Connective Tissue Disorder Other         lupus cousin     Depression Maternal Aunt      Respiratory Paternal Aunt         Asthma     Respiratory Other         neice     Respiratory Other         Asthma second cousin         Current Outpatient Medications   Medication Sig Dispense Refill     aspirin 81 MG tablet Take 81 mg by mouth daily       atorvastatin (LIPITOR) 40 MG tablet Take 1 tablet (40 mg) by mouth daily 90 tablet 3     Cholecalciferol (VITAMIN D3 PO) Take by mouth daily       fish oil-omega-3 fatty acids 1000 MG capsule Take 2 g by mouth daily       hydrocortisone (CORTAID) 1 % cream Apply sparingly to affected area three two daily for 14 days. (Patient not taking: Reported on 2/25/2019) 30 g 0     Allergies   Allergen Reactions     No Known Drug Allergies      Oxybutynin Difficulty breathing     Recent Labs   Lab Test 04/05/18  1453 02/08/18  1149 12/13/16  0909 10/15/15  1007   LDL  --  65 56 56   HDL  --  84 80 78   TRIG  --  72 53 66   ALT 39 76*  --  39   CR 0.98 0.85  --  0.82   GFRESTIMATED 56* 67  --  70   GFRESTBLACK 68 81  --  84   POTASSIUM 4.7 4.6  --  4.2   TSH 0.42 0.53  --   --       BP Readings from Last 3 Encounters:   02/25/19 123/71   01/31/19 134/82   09/02/18 130/78    Wt Readings from Last 3 Encounters:   02/25/19 83.9 kg (185 lb)   09/02/18 77.1 kg (170 lb)   04/18/18 83.9 kg (185 lb 1 oz)                  Labs reviewed in EPIC    Reviewed and updated as needed this visit by  "clinical staff  Tobacco  Allergies  Meds  Problems  Med Hx  Surg Hx  Fam Hx  Soc Hx        Reviewed and updated as needed this visit by Provider  Tobacco  Allergies  Meds  Problems  Med Hx  Surg Hx  Fam Hx         ROS:  Constitutional, HEENT, cardiovascular, pulmonary, GI, , musculoskeletal, neuro, skin, endocrine and psych systems are negative, except as otherwise noted.    OBJECTIVE:     /71 (BP Location: Left arm, Patient Position: Sitting, Cuff Size: Adult Regular)   Pulse 78   Temp 97.6  F (36.4  C) (Oral)   Resp 16   Ht 1.626 m (5' 4\")   Wt 83.9 kg (185 lb)   SpO2 97%   BMI 31.76 kg/m    Body mass index is 31.76 kg/m .  GENERAL: healthy, alert and no distress  RESP: lungs clear to auscultation - no rales, rhonchi or wheezes  CV: regular rate and rhythm, normal S1 S2, no S3 or S4, no murmur, click or rub, no peripheral edema and peripheral pulses strong  NEURO: Normal strength and tone, mentation intact and speech normal  PSYCH: mentation appears normal, affect normal/bright    Diagnostic Test Results:  none     ASSESSMENT/PLAN:       ICD-10-CM    1. Coronary artery disease involving native coronary artery of native heart without angina pectoris I25.10 [facility] PARAMEDIC REFERRAL [facility]   2. CARDIOVASCULAR SCREENING; LDL GOAL LESS THAN 160 Z13.6 atorvastatin (LIPITOR) 40 MG tablet     Lipid panel reflex to direct LDL Fasting     Comprehensive metabolic panel   3. Depression with anxiety F41.8 [facility] PARAMEDIC REFERRAL [facility]       Patient Instructions   Labs updated today.  Refills sent to pharmacy.  Options for Daron Bailon, Behavioral Health Consultant vs [facility] program discussed with patient.  Return to clinic with any worsening or changes in symptoms or go to ER Urgent care in off hours.      Sowmya Tinajero PA-C  [facility]"

## 2019-02-25 NOTE — PROGRESS NOTES
"  SUBJECTIVE:   Ciera Casas is a 68 year old female who presents to clinic today for the following health issues:      Hyperlipidemia Follow-Up      Rate your low fat/cholesterol diet?: { :781070::\"good\"}    Taking statin?  { :104696::\"No\"}    Other lipid medications/supplements?:  { :210370::\"none\"}      Amount of exercise or physical activity: {Exercise frequency days per week:171601}    Problems taking medications regularly: {Med Problems:809388::\"No\"}    Medication side effects: {CHRONIC MED SIDE EFFECTS:760848::\"none\"}    Diet: { :180298}    Other:  Having SOB, dizziness at night, on going for a while     {additional problems for provider to add:748341}    Problem list and histories reviewed & adjusted, as indicated.  Additional history: {NONE - AS DOCUMENTED:654448::\"as documented\"}    {HIST REVIEW/ LINKS 2:974004}    Reviewed and updated as needed this visit by clinical staff       Reviewed and updated as needed this visit by Provider         {PROVIDER CHARTING PREFERENCE:181786}  "

## 2019-02-25 NOTE — PATIENT INSTRUCTIONS
Labs updated today.  Refills sent to pharmacy.  Options for Daron Bailon, Behavioral Health Consultant vs [facility] program discussed with patient.  Return to clinic with any worsening or changes in symptoms or go to ER Urgent care in off hours.

## 2019-02-26 ENCOUNTER — PATIENT OUTREACH (OUTPATIENT)
Dept: BEHAVIORAL HEALTH | Facility: CLINIC | Age: 69
End: 2019-02-26

## 2019-02-26 LAB
ALBUMIN SERPL-MCNC: 4 G/DL (ref 3.4–5)
ALP SERPL-CCNC: 82 U/L (ref 40–150)
ALT SERPL W P-5'-P-CCNC: 34 U/L (ref 0–50)
ANION GAP SERPL CALCULATED.3IONS-SCNC: 12 MMOL/L (ref 3–14)
AST SERPL W P-5'-P-CCNC: 24 U/L (ref 0–45)
BILIRUB SERPL-MCNC: 0.5 MG/DL (ref 0.2–1.3)
BUN SERPL-MCNC: 23 MG/DL (ref 7–30)
CALCIUM SERPL-MCNC: 9.5 MG/DL (ref 8.5–10.1)
CHLORIDE SERPL-SCNC: 108 MMOL/L (ref 94–109)
CHOLEST SERPL-MCNC: 164 MG/DL
CO2 SERPL-SCNC: 19 MMOL/L (ref 20–32)
CREAT SERPL-MCNC: 0.96 MG/DL (ref 0.52–1.04)
GFR SERPL CREATININE-BSD FRML MDRD: 61 ML/MIN/{1.73_M2}
GLUCOSE SERPL-MCNC: 104 MG/DL (ref 70–99)
HDLC SERPL-MCNC: 71 MG/DL
LDLC SERPL CALC-MCNC: 78 MG/DL
NONHDLC SERPL-MCNC: 93 MG/DL
POTASSIUM SERPL-SCNC: 4.8 MMOL/L (ref 3.4–5.3)
PROT SERPL-MCNC: 7.4 G/DL (ref 6.8–8.8)
SODIUM SERPL-SCNC: 139 MMOL/L (ref 133–144)
TRIGL SERPL-MCNC: 77 MG/DL

## 2019-02-26 ASSESSMENT — ANXIETY QUESTIONNAIRES: GAD7 TOTAL SCORE: 7

## 2019-02-27 ENCOUNTER — TELEPHONE (OUTPATIENT)
Dept: FAMILY MEDICINE | Facility: CLINIC | Age: 69
End: 2019-02-27

## 2019-02-27 NOTE — TELEPHONE ENCOUNTER
Reason for Call:  Other call back    Detailed comments: pt states she was just in but got a call to do fasting labs and wants to know if they really need to do that and also states she would like to use the  pharmacy [facility] case of [facility] but keeps getting calls from both. Please advise.     Phone Number Patient can be reached at: Home number on file [phone] (home)    Best Time: asap    Can we leave a detailed message on this number? YES    Call taken on 2/27/2019 at 10:39 AM by Karla Delgado

## 2019-02-27 NOTE — TELEPHONE ENCOUNTER
Sowmya Tinajero (Julio) Prakash PEDERSEN  Please advise on #1   Thank you    Phone call to patient     1. Was NOT fasting for lipid panel done on 2/25/19 - per review is normal   Patient wants to know if she has to do FASTNG lipid panel ? Will ask provider and return call to patient after recommendations     2. Patient does NOT want to use [facility] on [address] - this is where her rx of lipitor was sent on 2/25/19 - writer tried to explain to patient how to call [facility] pharmacy to have it transferred and after discussing twice patient was still confused - writer offered to do this for patient - spoke to staff at [facility] pharmacy who will transfer     Refugio Garcia Nurse   [facility]

## 2019-02-28 NOTE — TELEPHONE ENCOUNTER
"No need to repeat lipids at this time - can consider fasting repeat in 6 months.  Thanks  Sowmya \"Julio\" SLAVA Tinajero   "

## 2019-02-28 NOTE — TELEPHONE ENCOUNTER
Patient informed, she was very happy.     She will f/u in 6 months for fasting labs     Angela Aaron, Registered Nurse   [facility]

## 2019-03-05 ENCOUNTER — ALLIED HEALTH/NURSE VISIT (OUTPATIENT)
Dept: BEHAVIORAL HEALTH | Facility: CLINIC | Age: 69
End: 2019-03-05
Payer: COMMERCIAL

## 2019-03-05 VITALS
TEMPERATURE: 97.4 F | SYSTOLIC BLOOD PRESSURE: 122 MMHG | DIASTOLIC BLOOD PRESSURE: 70 MMHG | HEART RATE: 81 BPM | OXYGEN SATURATION: 99 % | RESPIRATION RATE: 20 BRPM

## 2019-03-05 DIAGNOSIS — F41.8 DEPRESSION WITH ANXIETY: Primary | ICD-10-CM

## 2019-03-05 PROCEDURE — 99207 ZZC COMMUNITY PARAMEDIC - PATIENT NOT BILLABLE: CPT

## 2019-03-05 NOTE — PROGRESS NOTES
Community [facility]amedics Initial Visit  March 5, 2019 TIME:1000    Ciera Casas is a 68 year old female being seen at home for a Community Paramedic Home visit.    Present at appointment: Patient    Primary Diagnosis: Other (include Comment box)(SOB, chest pain)    Chief Complaint   Patient presents with     ER F/U       [facility] Utilization:   Clinic Utilization  Difficulty keeping appointments:: No  Compliance Concerns: No  No-Show Concerns: No  Utilization    Last refreshed: 3/5/2019  1:21 PM:  Hospital Admissions 0           Last refreshed: 3/5/2019  1:21 PM:  ED Visits 0           Last refreshed: 3/5/2019  1:21 PM:  No Show Count (past year) 0              Current as of: 3/5/2019  1:21 PM              Clinical Concerns:  Current Medical Concerns:  SOB, CP    Current Behavioral Concerns: Anxiety  Education Provided to patient: Discussed anxiety attacks, coping strategies.     Vitals: /70   Pulse 81   Temp 97.4  F (36.3  C)   Resp 20   SpO2 99%   BMI: There is no height or weight on file to calculate BMI.    Pain  Pain (GOAL):: No  Health Maintenance Reviewed: Not assessed    Clinical Pathway: Clinic Care Coordination Anxiety Assessment    Discharge:      Day of hospital discharge: 1/30/2019  What recommendations were made for follow up after your recent hospitalization?   Have the follow up appointments been scheduled? Yes  If not, can I help you set up these appointments? No  Transportation concerns (GOAL):: No    Symptoms:        Review of Symptoms/PE    Skin: negative  Eyes: negative  Ears/Nose/Throat: negative  Respiratory: No shortness of breath, dyspnea on exertion, cough, or hemoptysis  Cardiovascular: negative  Gastrointestinal: negative  Genitourinary: negative  Musculoskeletal: negative  Neurologic: negative  Psychiatric: excessive stress- ill and using dialysis. Pt feeling anxious    Medication Management:    Medications need to be refilled:no     Medications set up? No  Pill Box  issued: No  Scale issued: No    Functional Status:       Living Situation:       [facility] Home Safety Assessment  Floors:  Are there throw rugs on the floor?: Yes  Are there papers, books, towels, shoes, magazines on the floor?: No  Are there loose wires and cords you need to walk around? : No  Stairs and Steps  Are there papers, books, towels, shoes, magazines on the stairs?: No  Are some steps broken or uneven?: No  Is there a light over the stairway?: Yes  Has the stairway bulb burned out?: No  Is the carpet on the steps loose or torn?: No  Are the handrails loose or broken?: No  Kitchen:  Are the things you use often on high shelves?: No  Is your step stool unsteady?: No  Bathrooms:  Is the tub or shower floor slippery?: No  Do you need support when you get in and out of the tub?: No  Bedrooms:  Is the light near the bed hard to reach?: No  Is the path from your bed to the bathroom dark?: No  Comments: : The home is very well cared for.    Diet/Exercise/Sleep:  Inadequate nutrition (GOAL):: No  Food Insecurity: No  Tube Feeding: No  Exercise:: Currently not exercising  Significant changes in sleep pattern (GOAL): No    Transportation:  Transportation concerns (GOAL):: No  Transportation means:: Regular car     Psychosocial:       Core Healthy Days Survey - Healthy Days Survey - (Centers for Disease Control and Prevention - Used with Permission.)  Would you say that in general your health is: : Good  Now thinking about your physical health, which includes physical illness and injury, for how many days during the past 30 days was your physical health not good?: 3  Now thinking about your mental health, which includes stress, depression, and problems with emotions, for how many days during the past 30 days was your mental health not good?: 30  During the past 30 days, for about how many days did poor physical or mental health keep you from doing your usual activities, such as self-care, work, or  recreation?: 3    No  Face to Face in Home / Community    Today's PHQ-2 Score:      Today's PHQ-9 Score:   PHQ-9 SCORE 2/25/2019   PHQ-9 Total Score -   PHQ-9 Total Score 12        Today's GAD7 score:   SAURABH-7 SCORE 2/25/2019   Total Score 7            Financial/Insurance:   Financial/Insurance concerns (GOAL):: Yes(Recent ER bills from [employer]       Resources and Interventions:  Current Resources:                           HEALTH CARE GOALS:  Goals Addressed as of 3/5/2019 at 3:39 PM       Healthy Coping     Added 3/5/19 by Cecy Self, EMT     Goal Statement: call PCP about stress reduction  Measure of Success: seeing your PCP  Supportive Steps to Achieve:   Barriers:   Strengths: willingness  Date to Achieve By:1 week  Patient expressed understanding of goal: yes              Patient Active Problem List   Diagnosis     CARDIOVASCULAR SCREENING; LDL GOAL LESS THAN 160     Overweight     Abnormal blood sugar     Closed fracture of middle or proximal phalanx or phalanges of hand     Stiffness of joint, not elsewhere classified, hand     Pain in joint, hand     Other postprocedural status(V45.89)     Depression with anxiety     Atherosclerotic plaque     Chronic right shoulder pain     Coronary artery disease involving native coronary artery of native heart without angina pectoris         Current Outpatient Medications:      aspirin 81 MG tablet, Take 81 mg by mouth daily, Disp: , Rfl:      atorvastatin (LIPITOR) 40 MG tablet, Take 1 tablet (40 mg) by mouth daily, Disp: 90 tablet, Rfl: 3     Cholecalciferol (VITAMIN D3 PO), Take by mouth daily, Disp: , Rfl:      fish oil-omega-3 fatty acids 1000 MG capsule, Take 2 g by mouth daily, Disp: , Rfl:      hydrocortisone (CORTAID) 1 % cream, Apply sparingly to affected area three two daily for 14 days. (Patient not taking: Reported on 2/25/2019), Disp: 30 g, Rfl: 0    Plan:      Mental Health/Cognition:  Comment: Stressed and anxious.  She wants help to manage these  symptoms.  Plan: Call for PCP appointment.         Time spent with patient: 60    The patient meets one or more of the following criteria:  * Requires services to prevent readmission to a nursing home or hospital    Acute concern/Follow-up recommendations: Follow up with PCP about possible medication intervention, therapy.    Next CP visit scheduled: 2 weeks    Issues for Provider to follow up on: Anxiety, referral for BH therapy.    Provider follow up visit needed: She will schedule.

## 2019-03-14 ENCOUNTER — OFFICE VISIT (OUTPATIENT)
Dept: FAMILY MEDICINE | Facility: CLINIC | Age: 69
End: 2019-03-14
Payer: COMMERCIAL

## 2019-03-14 VITALS
HEART RATE: 88 BPM | DIASTOLIC BLOOD PRESSURE: 74 MMHG | RESPIRATION RATE: 17 BRPM | SYSTOLIC BLOOD PRESSURE: 138 MMHG | TEMPERATURE: 98.6 F | OXYGEN SATURATION: 99 %

## 2019-03-14 DIAGNOSIS — F43.0 ACUTE REACTION TO STRESS: Primary | ICD-10-CM

## 2019-03-14 DIAGNOSIS — F41.8 DEPRESSION WITH ANXIETY: ICD-10-CM

## 2019-03-14 PROCEDURE — 99214 OFFICE O/P EST MOD 30 MIN: CPT | Performed by: PHYSICIAN ASSISTANT

## 2019-03-14 RX ORDER — BUSPIRONE HYDROCHLORIDE 10 MG/1
10 TABLET ORAL 3 TIMES DAILY
Qty: 90 TABLET | Refills: 3 | Status: SHIPPED | OUTPATIENT
Start: 2019-03-14 | End: 2019-09-08

## 2019-03-14 ASSESSMENT — PATIENT HEALTH QUESTIONNAIRE - PHQ9
5. POOR APPETITE OR OVEREATING: NEARLY EVERY DAY
SUM OF ALL RESPONSES TO PHQ QUESTIONS 1-9: 11

## 2019-03-14 ASSESSMENT — ANXIETY QUESTIONNAIRES
2. NOT BEING ABLE TO STOP OR CONTROL WORRYING: MORE THAN HALF THE DAYS
GAD7 TOTAL SCORE: 17
1. FEELING NERVOUS, ANXIOUS, OR ON EDGE: NEARLY EVERY DAY
5. BEING SO RESTLESS THAT IT IS HARD TO SIT STILL: NEARLY EVERY DAY
7. FEELING AFRAID AS IF SOMETHING AWFUL MIGHT HAPPEN: MORE THAN HALF THE DAYS
3. WORRYING TOO MUCH ABOUT DIFFERENT THINGS: MORE THAN HALF THE DAYS
6. BECOMING EASILY ANNOYED OR IRRITABLE: MORE THAN HALF THE DAYS
IF YOU CHECKED OFF ANY PROBLEMS ON THIS QUESTIONNAIRE, HOW DIFFICULT HAVE THESE PROBLEMS MADE IT FOR YOU TO DO YOUR WORK, TAKE CARE OF THINGS AT HOME, OR GET ALONG WITH OTHER PEOPLE: SOMEWHAT DIFFICULT

## 2019-03-14 NOTE — PROGRESS NOTES
SUBJECTIVE:   Ciera Casas is a 68 year old female who presents to clinic today for the following health issues:    Depression and Anxiety Follow-Up    Status since last visit: Stable overall but bad today    Other associated symptoms: palpitation, frustrated and stress     Complicating factors:     Significant life event: Yes-  Went to the ER 2 times for heart palpitation(patient thinks she ws having a heart attack but was told there is nothing worng and probably anxiety) and now have a big bill causing more stress     Current substance abuse: None    History of being on antidepressants when she was working/kids at home.    Prozac may have made her sick; celexa? made her too drowsy?    PHQ 7/10/2017 2/8/2018 2/25/2019   PHQ-9 Total Score 2 7 12   Q9: Suicide Ideation Not at all Not at all Not at all     SAURABH-7 SCORE 12/12/2016 2/8/2018 2/25/2019   Total Score 0 6 7     In the past two weeks have you had thoughts of suicide or self-harm?  No.    Do you have concerns about your personal safety or the safety of others?   No  PHQ-9  English  PHQ-9   Any Language  SAURABH-7  Suicide Assessment Five-step Evaluation and Treatment (SAFE-T)      Amount of exercise or physical activity: None    Problems taking medications regularly: No    Medication side effects: none    Diet: regular (no restrictions)            Problem list and histories reviewed & adjusted, as indicated.  Additional history: as documented    Patient Active Problem List   Diagnosis     CARDIOVASCULAR SCREENING; LDL GOAL LESS THAN 160     Overweight     Abnormal blood sugar     Closed fracture of middle or proximal phalanx or phalanges of hand     Stiffness of joint, not elsewhere classified, hand     Pain in joint, hand     Other postprocedural status(V45.89)     Depression with anxiety     Atherosclerotic plaque     Chronic right shoulder pain     Coronary artery disease involving native coronary artery of native heart without angina pectoris     Past  Surgical History:   Procedure Laterality Date     APPENDECTOMY       BACK SURGERY       C NONSPECIFIC PROCEDURE  1992    laminectomy     CLOSED REDUCTION, PERCUTANEOUS PINNING FINGER, COMBINED  4/6/2012    Procedure:COMBINED CLOSED REDUCTION, PERCUTANEOUS PINNING FINGER; Right Ring Finger Proximal Fracture Closed Reduction Internal Fixation, percutaneous pinning  Choice anesthesia; Surgeon:MILDRED DICKSON; Location: OR       Social History     Tobacco Use     Smoking status: Former Smoker     Packs/day: 0.50     Years: 30.00     Pack years: 15.00     Types: Cigarettes     Last attempt to quit: 2/1/2009     Years since quitting: 10.1     Smokeless tobacco: Never Used   Substance Use Topics     Alcohol use: Yes     Comment: a couple of drinks on the weekend     Family History   Problem Relation Age of Onset     Cancer Mother         lung     Respiratory Mother         Emphysema     Cancer Father         esophagus     Osteoporosis Paternal Grandmother      Lipids Paternal Grandmother      Hypertension Maternal Aunt      Alzheimer Disease Other         fathers mother sister     Connective Tissue Disorder Other         lupus cousin     Depression Maternal Aunt      Respiratory Paternal Aunt         Asthma     Respiratory Other         neice     Respiratory Other         Asthma second cousin         Current Outpatient Medications   Medication Sig Dispense Refill     aspirin 81 MG tablet Take 81 mg by mouth daily       atorvastatin (LIPITOR) 40 MG tablet Take 1 tablet (40 mg) by mouth daily 90 tablet 3     busPIRone (BUSPAR) 10 MG tablet Take 1 tablet (10 mg) by mouth 3 times daily 90 tablet 3     Cholecalciferol (VITAMIN D3 PO) Take by mouth daily       fish oil-omega-3 fatty acids 1000 MG capsule Take 2 g by mouth daily       Allergies   Allergen Reactions     No Known Drug Allergies      Oxybutynin Difficulty breathing     BP Readings from Last 3 Encounters:   03/14/19 138/74   03/05/19 122/70   02/25/19 123/71    Wt  Readings from Last 3 Encounters:   02/25/19 83.9 kg (185 lb)   09/02/18 77.1 kg (170 lb)   04/18/18 83.9 kg (185 lb 1 oz)                  Labs reviewed in EPIC    Reviewed and updated as needed this visit by clinical staff  Tobacco  Allergies  Meds  Med Hx  Surg Hx  Fam Hx  Soc Hx      Reviewed and updated as needed this visit by Provider         ROS:  Constitutional, HEENT, cardiovascular, pulmonary, GI, , musculoskeletal, neuro, skin, endocrine and psych systems are negative, except as otherwise noted.    OBJECTIVE:     /74 (BP Location: Left arm, Patient Position: Sitting, Cuff Size: Adult Regular)   Pulse 88   Temp 98.6  F (37  C) (Oral)   Resp 17   SpO2 99%   There is no height or weight on file to calculate BMI.  GENERAL: healthy, alert and no distress  RESP: lungs clear to auscultation - no rales, rhonchi or wheezes  CV: regular rate and rhythm, normal S1 S2, no S3 or S4, no murmur, click or rub, no peripheral edema and peripheral pulses strong  NEURO: Normal strength and tone, mentation intact and speech normal  PSYCH: mentation appears normal, affect normal/bright    Diagnostic Test Results:  none     ASSESSMENT/PLAN:     (F43.0) Acute reaction to stress  (primary encounter diagnosis)  Comment: worsening symptom  Plan: CARE COORDINATION REFERRAL, busPIRone (BUSPAR)         10 MG tablet        Options discussed with patient at length - will start with Daron Bailon, Behavioral Health Consultant and care coordination referral to see if can help with stressors (medical bills?); see below for medical management and patient instructions.    (F41.8) Depression with anxiety  Comment: Long standing, chronic, worse  Plan: new stressors present- ready for trial of medicine, will start with Buspar, see patient instructions but consider addition or change to Zoloft in near future pending above.       ICD-10-CM    1. Acute reaction to stress F43.0 CARE COORDINATION REFERRAL     busPIRone (BUSPAR) 10  "MG tablet   2. Depression with anxiety F41.8        Patient Instructions   Discussed the pathophysiology of anxiety/depression episodes and the various symptoms seen associated with anxiety episodes. Discussed possible triggers including fatigue, depression, stress, and chemicals such as alcohol, caffeine and certain drugs. Discussed the treatment including an aerobic exercise program, adequate rest, and both rescue meds and maintenance meds.   For your anxiety:   1. Consider therapy - CBT - cognitive behavioral therapy - Daron Bailon's card given to patient.  2. \"The Chemistry of Calm\" by Wilson Banegas   3. \"Hope and Help for your Nerves\" by Nina Varner   4. Vitamin D 3955-5900 IU daily   5. Valerian root extract for relaxation and sleep OR Melatonin at bedtime.  Trial of Buspar 10 mg nightly for the next 1-2 weeks then as needed if needed/tolerate for increased anxiety- max dose 30 mg two times a day.  Pending above, may need daily Zoloft type daily options.  Patient to return to clinic in 1 month for follow up then 5-6 months for further refills or sooner with any worsening or changes in symptoms.         Sowmya Tinajero PA-C  [facility]  "

## 2019-03-14 NOTE — PATIENT INSTRUCTIONS
"Discussed the pathophysiology of anxiety/depression episodes and the various symptoms seen associated with anxiety episodes. Discussed possible triggers including fatigue, depression, stress, and chemicals such as alcohol, caffeine and certain drugs. Discussed the treatment including an aerobic exercise program, adequate rest, and both rescue meds and maintenance meds.   For your anxiety:   1. Consider therapy - CBT - cognitive behavioral therapy - Daron Bailon's card given to patient.  2. \"The Chemistry of Calm\" by Wilson Banegas   3. \"Hope and Help for your Nerves\" by Nina Varner   4. Vitamin D 0668-7218 IU daily   5. Valerian root extract for relaxation and sleep OR Melatonin at bedtime.  Trial of Buspar 10 mg nightly for the next 1-2 weeks then as needed if needed/tolerate for increased anxiety- max dose 30 mg two times a day.  Pending above, may need daily Zoloft type daily options.  Patient to return to clinic in 1 month for follow up then 5-6 months for further refills or sooner with any worsening or changes in symptoms.     "

## 2019-03-15 ASSESSMENT — ANXIETY QUESTIONNAIRES: GAD7 TOTAL SCORE: 17

## 2019-03-18 ENCOUNTER — PATIENT OUTREACH (OUTPATIENT)
Dept: CARE COORDINATION | Facility: CLINIC | Age: 69
End: 2019-03-18

## 2019-03-18 ASSESSMENT — ACTIVITIES OF DAILY LIVING (ADL): DEPENDENT_IADLS:: INDEPENDENT

## 2019-03-18 NOTE — PROGRESS NOTES
Clinic Care Coordination Contact    Clinic Care Coordination Contact  OUTREACH    Referral Information:  Referral Source: PCP    Primary Diagnosis: Psychosocial(SOB, chest pain)    Chief Complaint   Patient presents with     Clinic Care Coordination - Initial        Universal Utilization: No concerns  Clinic Utilization  Difficulty keeping appointments:: No  Compliance Concerns: No  No-Show Concerns: No  No PCP office visit in Past Year: No  Utilization    Last refreshed: 3/15/2019  7:47 AM:  Hospital Admissions 0           Last refreshed: 3/15/2019  7:47 AM:  ED Visits 2           Last refreshed: 3/15/2019  7:47 AM:  No Show Count (past year) 0              Current as of: 3/15/2019  7:47 AM            Clinical Concerns:  Current Medical Concerns: none noted  Current Behavioral Concerns:  Anxiety/depression, life stressors  Education Provided to patient: SW provided education to patient regarding programs that can assist with covering the cost of her bills depending on her income/assets    Pain  Pain (GOAL):: No  Health Maintenance Reviewed: Not assessed  Clinical Pathway: None    Medication Management:  Patient manages - no concerns     Functional Status:  Dependent ADLs:: Independent  Dependent IADLs:: Independent  Bed or wheelchair confined:: No  Mobility Status: Independent  Fallen 2 or more times in the past year?: No  Any fall with injury in the past year?: No    Living Situation:  Current living arrangement:: I live in a private home(with SO )    Diet/Exercise/Sleep:  Inadequate nutrition (GOAL):: No  Food Insecurity: No  Tube Feeding: No  Exercise:: Currently not exercising  Significant changes in sleep pattern (GOAL): No    Transportation:  Transportation concerns (GOAL):: No  Transportation means:: Regular car     Psychosocial:  Sikh or spiritual beliefs that impact treatment:: No  Mental health DX:: Yes  Mental health DX how managed:: Medication, Outpatient Counseling  Mental health management  "concern (GOAL):: No  Informal Support system:: Significant other     Financial/Insurance: [employer]  Financial/Insurance concerns (GOAL):: Yes(Recent ER bills from [facility].)     Resources and Interventions:  Current Resources:   Community Resources: OP Mental Health, Other (see comment)(Community Paramedic)     Advance Care Plan/Directive  Advanced Care Plans/Directives on file:: No     Goals:   Goals        General    #1 Financial Wellbeing (pt-stated)     Notes - Note created  3/18/2019 10:49 AM by Luciana Washington LGSW    Goal Statement: I will determine what my medical bills are and if I will need assistance in paying for them  Measure of Success: Patient will know what she owes and will determine if she has the ability to pay  Supportive Steps to Achieve: Patient will review her paperwork. SW will follow-up in 3-4 weeks to check in on ability to pay and need for resources  Barriers: life stressors  Strengths: care coordination  Date to Achieve By: 5/1/19  Patient expressed understanding of goal: yes                       SW received referral to outreach to patient regarding financial concerns. Patient was recently seen in the ED at [facility] and is concerned regarding her bills. She is struggling with anxiety/depression. SW spoke with patient who appreciates the call. Patient states that a lot of the paperwork she is getting states that \"it isn't a bill.\" Patient is unsure of what her out of pocket costs will be. She would like to take SW number to call when she has things sorted out so that she will know if she needs assistance. SW informed patient about programs to assist with medical bills and that patient would have to apply/qualify. Patient declined more information about the programs today. Patient agreed to SW follow-up in 3-4 weeks to check in on bill status.    Patient/Caregiver understanding: Pt reports understanding and denies any additional questions or concerns at this times. SW CC engaged " in [employer] communication during encounter.     Future Appointments              In 3 days 3, [facility] Paramedic [facility]    In 3 days Daron Bailon, [facility]          Plan:      Patient will organize her paperwork and determine the outpatient cost of her bills  Patient will contact SW if she needs assistance in completing this  SW will outreach to patient in 3-4 weeks to check in on bill status and assess patient's need to apply for financial programs at that time    Luciana Washington, [facility]  Social Work Care Coordinator   [facility]  [phone]

## 2019-03-21 ENCOUNTER — ALLIED HEALTH/NURSE VISIT (OUTPATIENT)
Dept: BEHAVIORAL HEALTH | Facility: CLINIC | Age: 69
End: 2019-03-21
Payer: COMMERCIAL

## 2019-03-21 ENCOUNTER — OFFICE VISIT (OUTPATIENT)
Dept: BEHAVIORAL HEALTH | Facility: CLINIC | Age: 69
End: 2019-03-21
Payer: COMMERCIAL

## 2019-03-21 DIAGNOSIS — F43.23 ADJUSTMENT DISORDER WITH MIXED ANXIETY AND DEPRESSED MOOD: Primary | ICD-10-CM

## 2019-03-21 DIAGNOSIS — F41.8 DEPRESSION WITH ANXIETY: Primary | ICD-10-CM

## 2019-03-21 PROCEDURE — 90791 PSYCH DIAGNOSTIC EVALUATION: CPT | Performed by: SOCIAL WORKER

## 2019-03-21 PROCEDURE — 99207 C COMMUNITY PARAMEDIC - PATIENT NOT BILLABLE: CPT

## 2019-03-21 ASSESSMENT — ACTIVITIES OF DAILY LIVING (ADL): DEPENDENT_IADLS:: INDEPENDENT

## 2019-03-21 NOTE — PROGRESS NOTES
Community Paramedics Follow-up Visit  March 21, 2019  TIME: 1030    Ciera Casas is a 68 year old female being seen at home for a follow-up visit.    Present at appointment:  patient         Chief Complaint   Patient presents with     ER F/U     anxiety       Universal Utilization:      Utilization    Last refreshed: 3/21/2019 12:56 PM:  Hospital Admissions 0           Last refreshed: 3/21/2019 12:56 PM:  ED Visits 0           Last refreshed: 3/21/2019 12:56 PM:  No Show Count (past year) 0              Current as of: 3/21/2019 12:56 PM              There were no vitals taken for this visit.    Clinical Concerns:  Current Medical Concerns:  NA    Current Behavioral Concerns: Anxious, panic attacks    Education Provided to patient: na   Medication set up? No  Pill Box issued: No  Scale issued: No  Health Maintenance Reviewed:      Clinical Pathway: None    No  Face to Face in Home / Community    Recent blood sugars:     Review of Symptoms/PE    Skin: negative  Eyes: negative  Ears/Nose/Throat: negative  Respiratory: No shortness of breath, dyspnea on exertion, cough, or hemoptysis  Cardiovascular: negative  Gastrointestinal: negative  Genitourinary: negative  Musculoskeletal: negative  Neurologic: negative  Psychiatric: excessive stress, anxiety and nervous breakdown, The pt states she had a nervous breakdown after her conversation with the LISW    Pain Management::                 Plan:     Time spent with patient: 30    The patient meets one or more of the following criteria:  * Requires services to prevent readmission to a nursing home or hospital    Acute concern/Follow-up recommendations:   Continue with therapy, and medications.    Next CP visit scheduled: 4/18/19    Issues for Provider to follow up on: Anxiety    Provider follow up visit needed: TBD

## 2019-03-22 ENCOUNTER — TELEPHONE (OUTPATIENT)
Dept: CARE COORDINATION | Facility: CLINIC | Age: 69
End: 2019-03-22
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
## 2019-03-25 ENCOUNTER — TELEPHONE (OUTPATIENT)
Dept: FAMILY MEDICINE | Facility: CLINIC | Age: 69
End: 2019-03-25

## 2019-03-25 ENCOUNTER — PATIENT OUTREACH (OUTPATIENT)
Dept: CARE COORDINATION | Facility: CLINIC | Age: 69
End: 2019-03-25

## 2019-03-25 ASSESSMENT — ACTIVITIES OF DAILY LIVING (ADL): DEPENDENT_IADLS:: INDEPENDENT

## 2019-03-25 NOTE — TELEPHONE ENCOUNTER
Pt had some medical bills coming in from other clinics. Pt states that PCP was going to help her get that sorted out. Perhaps pt could speak with a ? Pt states that she has been talking with Kathy, but now Kathy is not returning phone calls. Please assist. Thanks. Ok to leave message.     Ninfa Campbell  [facility] Patient Rep

## 2019-03-25 NOTE — PROGRESS NOTES
Clinic Care Coordination Contact  [facility]/Voicemail    Referral Source: PCP  Clinical Data: Care Coordinator Outreach    Follow-up from recent office visit and patient outreach call to SW    Outreach attempted x 1.  Left message on voicemail with call back information and requested return call.  Plan: Care Coordinator will try to reach patient again in 1-2 business days.    BETO Sullivan  Social Work Care Coordinator   [facility] [place]  [phone]      Patient called SW back and requested an in-person meeting. Discussed that SW is unable to come to patient's home but it would be possible to schedule a meeting at the clinic. Patient is able and willing to do this. SW discussed that this SW was covering for normal clinic SW who was on vacation and that  will refer patient to clinic Alicia GOSS.  provided a hand-off to Alicia who will contact patient to schedule an appointment.    BETO Sullivan  Social Work Care Coordinator   [facility]  [phone]

## 2019-03-25 NOTE — TELEPHONE ENCOUNTER
SW was able to reach out this morning and talked with patient. See CC notes for info    Luciana Washington [facility]  Social Work Care Coordinator   [facility] [place]  [phone]

## 2019-03-26 ENCOUNTER — PATIENT OUTREACH (OUTPATIENT)
Dept: CARE COORDINATION | Facility: CLINIC | Age: 69
End: 2019-03-26

## 2019-03-26 ASSESSMENT — ACTIVITIES OF DAILY LIVING (ADL): DEPENDENT_IADLS:: INDEPENDENT

## 2019-03-26 NOTE — LETTER
[facility]  Complex Care Plan  About Me  Patient Name:  Ciera Casas    YOB: 1950  Age:     68 year old   [place] MRN:   [id number] Telephone Information:  Home Phone [phone]   Mobile [phone]       Address:    [address]  [place] [address] Email address:  No e-mail address on record      Emergency Contact(s)  Name Relationship Lgl Grd Work Phone Home Phone Mobile Phone   1. UMU BARNETT Significant ot* No  [phone]           Primary language:  English     needed? No   [place] [facility]:  [phone] op. 1  Other communication barriers: None  Preferred Method of Communication:  Mail  Current living arrangement: I live in a private home(with SO )  Mobility Status/ Medical Equipment: Independent    Health Maintenance  Health Maintenance Reviewed: Due/Overdue   Health Maintenance Due   Topic Date Due     ZOSTER IMMUNIZATION (2 of 3) 04/27/2012     ADVANCE DIRECTIVE PLANNING Q5 YRS  11/09/2017     MEDICARE ANNUAL WELLNESS VISIT  02/08/2019       My Access Plan  Medical Emergency 911   Primary Clinic Line [facility] - [phone]   24 Hour Appointment Line [phone] or  [phone]) (toll-free)   24 Hour Nurse Line [phone] (toll-free)   Preferred Urgent Care     Preferred Hospital [facility]  [phone]   Preferred Pharmacy [facility] [address]     Behavioral Health Crisis Line The National Suicide Prevention Lifeline at 1-565.324.7923 or 911     My Care Team Members    Patient Care Team       Relationship Specialty Notifications Start End    Sowmya Tinajero PA-C PCP - General Physician Assistant  7/20/15     Phone: [phone] Fax: [phone] [address] [facility] [address]    Sowmya Tinajero PA-C MD Physician Assistant  7/20/15     Referred to Derm    Phone: [phone]  Fax: [phone] [address] [facility] [address]    Leandro Frye MD MD House Physician  7/20/15     Phone: [phone] Fax: [phone] [address] [facility] [address]    Sowmya Tinajero PA-C Assigned PCP   2/25/18     Phone: [phone] Fax: [phone] [address] [facility] [address]    Cecy Self, EMT Community Paramedic   2/26/19     Alicia Cid LSW Lead Care Coordinator Primary Care - CC  3/25/19     Phone: [phone]                 My Care Plans  Self Management and Treatment Plan  Goals and (Comments)  Goals        General    #1 Financial Wellbeing (pt-stated)     Notes - Note created  3/18/2019 10:49 AM by Luciana Washington LGSW    Goal Statement: I will determine what my medical bills are and if I will need assistance in paying for them  Measure of Success: Patient will know what she owes and will determine if she has the ability to pay  Supportive Steps to Achieve: Patient will review her paperwork. SW will follow-up in 3-4 weeks to check in on ability to pay and need for resources  Barriers: life stressors  Strengths: care coordination  Date to Achieve By: 5/1/19  Patient expressed understanding of goal: yes          Healthy Coping     Notes - Note created  3/5/2019 11:03 AM by Cecy Self, EMT    Goal Statement: call PCP about stress reduction  Measure of Success: seeing your PCP  Supportive Steps to Achieve:   Barriers:   Strengths: willingness  Date to Achieve By:1 week  Patient expressed understanding of goal: yes          Healthy Coping     Notes - Note created  3/21/2019 12:36 PM by Cecy Self, EMT    Goal Statement: Coping strategies for anxiety  Measure of Success: Less anxiety  Supportive Steps to Achieve: see therapist, continue medications   Barriers: external stressors  Strengths: desire to reduce panic attacks    Date to Achieve By: one month  Patient expressed understanding of  goal: yes               Action Plans on File: Depression       My Medical and Care Information  Problem List   Patient Active Problem List   Diagnosis     CARDIOVASCULAR SCREENING; LDL GOAL LESS THAN 160     Overweight     Abnormal blood sugar     Closed fracture of middle or proximal phalanx or phalanges of hand     Stiffness of joint, not elsewhere classified, hand     Pain in joint, hand     Other postprocedural status(V45.89)     Depression with anxiety     Atherosclerotic plaque     Chronic right shoulder pain     Coronary artery disease involving native coronary artery of native heart without angina pectoris      Current Medications and Allergies:  See printed Medication Report.    Care Coordination Start Date: 3/18/2019   Frequency of Care Coordination: monthly   Form Last Updated: 03/26/2019

## 2019-03-26 NOTE — LETTER
[place] CARE COORDINATION - [facility]  [address] [place] [address]    March 26, 2019    Ciera Casas  [address]  [facility] [address]      Dear Ciera,    I am a clinic care coordinator who works with Sowmya Tinajero PA-C at [facility]. I wanted to thank you for spending the time to talk with me.  I wanted to introduce myself and provide you with my contact information so that you can call me with questions or concerns about your health care. Below is a description of clinic care coordination and how I can further assist you.     The clinic care coordinator is a registered nurse and/or  who understand the health care system. The goal of clinic care coordination is to help you manage your health and improve access to the [facility] system in the most efficient manner. The registered nurse can assist you in meeting your health care goals by providing education, coordinating services, and strengthening the communication among your providers. The  can assist you with financial, behavioral, psychosocial, chemical dependency, counseling, and/or psychiatric resources.    Please feel free to contact me at [phone], with any questions or concerns. We at [facility] are focused on providing you with the highest-quality healthcare experience possible and that all starts with you.     Sincerely,     DAREN Scott    Enclosed: I have enclosed a copy of the Complex Care Plan. This has helpful information and goals that we have talked about. Please keep this in an easy to access place to use as needed.

## 2019-03-26 NOTE — PROGRESS NOTES
Clinic Care Coordination Contact  Care Team Conversations    SW receives handoff from Covering SW, Luciana Washington. SW outreaches to patient to follow up on Care Coordination Referral -  Financial Support: Medical Billing issues. Patient indicates she was in the ED at [facility] and has paid some of the her bills but is unsure if they are all paid off. She primarily is seen at [facility]. SW attempted to assess what her remaining balance is. Patient reports she is unaware. SW advised to contact the billing department on the [facility] medical bill to inquire about remaining balance. SW assisted in walking her through how to locate this contact number. SW advised she contact this SW and/or meet with SW in clinic after contacting [facility] billing to obtain remaining balance to come up with a plan moving forward. Patient was in agreement.     Plan: Patient will contact billing dept and return call to SW. SW will outreach to patient again in 3-5 business days if SW has not received return call from patient. SW to mail Care Coordination Intro letter with contact information and copy of pts care plan.     DAREN Scott    Care Coordination  [facility]  [phone]    ADDENDUM: 3/28/19, Luciana Washington receives vmail from patient and informs this SW. Vmail from patient indicated she knows who to contact regarding medical bills and will be able to take care of it moving forward.     Plan: SW will outreach to confirm and close patient to care coordination if applicable.

## 2019-04-01 ASSESSMENT — ACTIVITIES OF DAILY LIVING (ADL): DEPENDENT_IADLS:: INDEPENDENT

## 2019-04-01 NOTE — PROGRESS NOTES
Clinic Care Coordination Contact  [facility]/Voicemail    Referral Source: PCP  Clinical Data: Care Coordinator Outreach  Outreach attempted x 1.  Left message on voicemail with call back information and requested return call.  Plan: Care Coordinator will await return call. Care Coordinator will try to reach patient again in 3-5 business days.    Alicia Cid, [facility]   Care Coordination  [place]  [phone]

## 2019-04-02 ASSESSMENT — ACTIVITIES OF DAILY LIVING (ADL): DEPENDENT_IADLS:: INDEPENDENT

## 2019-04-02 NOTE — PROGRESS NOTES
Clinic Care Coordination Contact  Care Team Conversations    SW receives return voicemail from patient indicating that she was able to have her issue with medical bills resolved after she contacted the billing department. She reports appreciation for the assistance and reports she has no further needs.     Plan: No further outreaches at this time. SW will close patient out from Care Coordination per request of the patient. If new psychosocial needs arise a new Care Coordination referral can be placed.    Alicia Cid, [facility]   Care Coordination  [place]  [phone]

## 2019-04-12 ENCOUNTER — OFFICE VISIT (OUTPATIENT)
Dept: URGENT CARE | Facility: URGENT CARE | Age: 69
End: 2019-04-12
Payer: COMMERCIAL

## 2019-04-12 VITALS
SYSTOLIC BLOOD PRESSURE: 98 MMHG | TEMPERATURE: 97.8 F | BODY MASS INDEX: 31.76 KG/M2 | HEART RATE: 81 BPM | HEIGHT: 64 IN | DIASTOLIC BLOOD PRESSURE: 60 MMHG | OXYGEN SATURATION: 97 % | RESPIRATION RATE: 14 BRPM

## 2019-04-12 DIAGNOSIS — J06.9 UPPER RESPIRATORY TRACT INFECTION, UNSPECIFIED TYPE: Primary | ICD-10-CM

## 2019-04-12 PROCEDURE — 99213 OFFICE O/P EST LOW 20 MIN: CPT

## 2019-04-12 NOTE — PROGRESS NOTES
SUBJECTIVE:   Ciera Casas is a 68 year old female who presents to clinic today for the following health issues:      HPI     Presents with increased cough and cold symptoms which have been intermittent all winter.  She feels she starts to get better- then wakes up the next day and is worse again.  She has not had fever this past week.  Notes no productive cough.  Has been fatigued with decreased energy and appetite.    Additional history: as documented    Reviewed and updated as needed this visit by clinical staff  Allergies  Meds         Reviewed and updated as needed this visit by Provider             Patient Active Problem List   Diagnosis     CARDIOVASCULAR SCREENING; LDL GOAL LESS THAN 160     Overweight     Abnormal blood sugar     Closed fracture of middle or proximal phalanx or phalanges of hand     Stiffness of joint, not elsewhere classified, hand     Pain in joint, hand     Other postprocedural status(V45.89)     Depression with anxiety     Atherosclerotic plaque     Chronic right shoulder pain     Coronary artery disease involving native coronary artery of native heart without angina pectoris     Past Surgical History:   Procedure Laterality Date     APPENDECTOMY       BACK SURGERY       C NONSPECIFIC PROCEDURE  1992    laminectomy     CLOSED REDUCTION, PERCUTANEOUS PINNING FINGER, COMBINED  4/6/2012    Procedure:COMBINED CLOSED REDUCTION, PERCUTANEOUS PINNING FINGER; Right Ring Finger Proximal Fracture Closed Reduction Internal Fixation, percutaneous pinning  Choice anesthesia; Surgeon:MILDRED DICKSON; Location: OR       Social History     Tobacco Use     Smoking status: Former Smoker     Packs/day: 0.50     Years: 30.00     Pack years: 15.00     Types: Cigarettes     Last attempt to quit: 2/1/2009     Years since quitting: 10.1     Smokeless tobacco: Never Used   Substance Use Topics     Alcohol use: Yes     Comment: a couple of drinks on the weekend     Family History   Problem Relation Age of  "Onset     Cancer Mother         lung     Respiratory Mother         Emphysema     Cancer Father         esophagus     Osteoporosis Paternal Grandmother      Lipids Paternal Grandmother      Hypertension Maternal Aunt      Alzheimer Disease Other         fathers mother sister     Connective Tissue Disorder Other         lupus cousin     Depression Maternal Aunt      Respiratory Paternal Aunt         Asthma     Respiratory Other         neice     Respiratory Other         Asthma second cousin         Current Outpatient Medications   Medication Sig Dispense Refill     aspirin 81 MG tablet Take 81 mg by mouth daily       atorvastatin (LIPITOR) 40 MG tablet Take 1 tablet (40 mg) by mouth daily 90 tablet 3     Cholecalciferol (VITAMIN D3 PO) Take by mouth daily       fish oil-omega-3 fatty acids 1000 MG capsule Take 2 g by mouth daily       busPIRone (BUSPAR) 10 MG tablet Take 1 tablet (10 mg) by mouth 3 times daily 90 tablet 3     Allergies   Allergen Reactions     Oxybutynin Difficulty breathing     Recent Labs   Lab Test 02/25/19  1437 04/05/18  1453 02/08/18  1149 12/13/16  0909   LDL 78  --  65 56   HDL 71  --  84 80   TRIG 77  --  72 53   ALT 34 39 76*  --    CR 0.96 0.98 0.85  --    GFRESTIMATED 61 56* 67  --    GFRESTBLACK 70 68 81  --    POTASSIUM 4.8 4.7 4.6  --    TSH  --  0.42 0.53  --       BP Readings from Last 3 Encounters:   04/12/19 98/60   03/14/19 138/74   03/05/19 122/70    Wt Readings from Last 3 Encounters:   02/25/19 83.9 kg (185 lb)   09/02/18 77.1 kg (170 lb)   04/18/18 83.9 kg (185 lb 1 oz)           ROS:  Constitutional, HEENT, cardiovascular, pulmonary, gi and gu systems are negative, except as otherwise noted.    OBJECTIVE:     BP 98/60   Pulse 81   Temp 97.8  F (36.6  C) (Oral)   Resp 14   Ht 1.626 m (5' 4\")   SpO2 97%   BMI 31.76 kg/m    Body mass index is 31.76 kg/m .  GENERAL: healthy, alert and no distress  EYES: Eyes grossly normal to inspection, PERRL and conjunctivae and sclerae " normal  HENT: ear canals and TM's normal, nose and mouth without ulcers or lesions  NECK: no adenopathy, no asymmetry, masses, or scars and thyroid normal to palpation  RESP: lungs clear to auscultation - no rales, rhonchi or wheezes  CV: regular rate and rhythm, normal S1 S2, no S3 or S4, no murmur, click or rub, no peripheral edema and peripheral pulses strong  ABDOMEN: soft, nontender, no hepatosplenomegaly, no masses and bowel sounds normal  MS: no gross musculoskeletal defects noted, no edema  PSYCH: mentation appears normal, affect normal/bright    ASSESSMENT/PLAN:     1. Upper respiratory tract infection, unspecified type    Continue with supportive cares at this time including rest and fluids.  Reassured exam is otherwise normal today and viral illnesses in the community have been more prolonged this winter than typical with the unusual weather we have had in [place] this winter.  Close Follow-up if any prolonged fever or productive cough or SOB or any change or worsening prn.    Pricila Elizondo MD  [facility] Provider  [facility]ENT [facility]ARE

## 2019-04-18 ENCOUNTER — ALLIED HEALTH/NURSE VISIT (OUTPATIENT)
Dept: BEHAVIORAL HEALTH | Facility: CLINIC | Age: 69
End: 2019-04-18
Payer: COMMERCIAL

## 2019-04-18 VITALS
DIASTOLIC BLOOD PRESSURE: 78 MMHG | HEART RATE: 84 BPM | OXYGEN SATURATION: 96 % | RESPIRATION RATE: 16 BRPM | SYSTOLIC BLOOD PRESSURE: 110 MMHG

## 2019-04-18 DIAGNOSIS — F41.8 DEPRESSION WITH ANXIETY: Primary | ICD-10-CM

## 2019-04-18 PROCEDURE — 99207 C COMMUNITY PARAMEDIC - PATIENT NOT BILLABLE: CPT

## 2019-04-18 ASSESSMENT — ACTIVITIES OF DAILY LIVING (ADL): DEPENDENT_IADLS:: INDEPENDENT

## 2019-04-18 NOTE — PROGRESS NOTES
Community Paramedics Follow-up Visit  April 18, 2019  TIME: 10:00    Ciera Casas is a 68 year old female being seen at home for a follow-up visit.    Present at appointment: Patient    Primary Diagnosis: Other (include Comment box)(SOB, chest pain)    Chief Complaint   Patient presents with     Outreach       [place] Utilization:   Clinic Utilization  Difficulty keeping appointments:: No  Compliance Concerns: No  No-Show Concerns: No  Utilization    Last refreshed: 4/18/2019  9:14 AM:  Hospital Admissions 0           Last refreshed: 4/18/2019  9:14 AM:  ED Visits 0           Last refreshed: 4/18/2019  9:14 AM:  No Show Count (past year) 0              Current as of: 4/18/2019  9:14 AM              /78   Pulse 84   Resp 16   SpO2 96%     Clinical Concerns:  Current Medical Concerns:   Recent virus    Current Behavioral Concerns: na    Education Provided to patient: na   Medication set up? No  Pill Box issued: No  Scale issued: No  Health Maintenance Reviewed: Due/Overdue     Clinical Pathway: None    No  Face to Face in Home / Community        Review of Symptoms/PE    Skin: negative  Eyes: negative  Ears/Nose/Throat: negative  Respiratory: No shortness of breath, dyspnea on exertion, cough, or hemoptysis  Cardiovascular: negative  Gastrointestinal: negative  Genitourinary: negative  Musculoskeletal: negative  Neurologic: negative  Psychiatric: anxiety    Pain Management::  Pain (GOAL):: No    Medication Reconciliation  Medication adherence problem (GOAL):: No  Knowledgeable about how to use meds:: Yes  Medication side effects suspected:: No    Diet/Exercise/Sleep  Inadequate nutrition (GOAL):: No  Food Insecurity: No  Tube Feeding: No  Exercise:: Currently not exercising  Significant changes in sleep pattern (GOAL): No    Plan:     Time spent with patient: 15    The patient meets one or more of the following criteria:  * Has received hospital emergency department services three or more times in four  consecutive months within a twelve month period    Acute concern/Follow-up recommendations: The pt states her anxiety is less due to her figuring out her own bills.  She states she is very disappointed with the follow up from the clinic.  She is thinking about discontinuing her buspar at the end of the month.  I advised her to try and continue but if she is stopping she needs to tape down with doctors orders.  She understood the recommendations.  She declined any further CP visits.  She was advised she could reach out in the future with questions or concerns.    Next CP visit scheduled: none    Issues for Provider to follow up on: See notes above    Provider follow up visit needed: KEVIN

## 2019-04-19 ENCOUNTER — TELEPHONE (OUTPATIENT)
Dept: FAMILY MEDICINE | Facility: CLINIC | Age: 69
End: 2019-04-19

## 2019-04-19 NOTE — TELEPHONE ENCOUNTER
From community paramedic.  This was cc'd chart.  FRANCESCO Cruz      Plan:      Time spent with patient: 15     The patient meets one or more of the following criteria:  * Has received hospital emergency department services three or more times in four consecutive months within a twelve month period     Acute concern/Follow-up recommendations: The pt states her anxiety is less due to her figuring out her own bills.  She states she is very disappointed with the follow up from the clinic.  She is thinking about discontinuing her buspar at the end of the month.  I advised her to try and continue but if she is stopping she needs to tape down with doctors orders.  She understood the recommendations.  She declined any further CP visits.  She was advised she could reach out in the future with questions or concerns.     Next CP visit scheduled: none     Issues for Provider to follow up on: See notes above     Provider follow up visit needed: KEVIN     
NO ROM right knee; immobilizer on/fall precautions
fall precautions/NO ROM right knee; immobilizer on

## 2019-05-14 ENCOUNTER — OFFICE VISIT (OUTPATIENT)
Dept: FAMILY MEDICINE | Facility: CLINIC | Age: 69
End: 2019-05-14
Payer: COMMERCIAL

## 2019-05-14 VITALS
HEART RATE: 82 BPM | SYSTOLIC BLOOD PRESSURE: 116 MMHG | TEMPERATURE: 97.5 F | OXYGEN SATURATION: 100 % | DIASTOLIC BLOOD PRESSURE: 72 MMHG | RESPIRATION RATE: 12 BRPM

## 2019-05-14 DIAGNOSIS — L98.9 LESION OF EYEBROW: ICD-10-CM

## 2019-05-14 DIAGNOSIS — H57.9 ITCH OF LEFT EYE: Primary | ICD-10-CM

## 2019-05-14 PROCEDURE — 99213 OFFICE O/P EST LOW 20 MIN: CPT | Performed by: NURSE PRACTITIONER

## 2019-05-14 NOTE — PROGRESS NOTES
SUBJECTIVE:   Ciera Casas is a 68 year old female who presents to clinic today for the following   health issues:        Derm      Duration: 6-8 weeks    Description (location/character/radiation): spot in left eyebrow    Intensity:  No pain    Accompanying signs and symptoms: has grown in size, has gotten darker    History (similar episodes/previous evaluation): has age spots on other areas, thought this might be one     Precipitating or alleviating factors: None    Therapies tried and outcome: None    Pt notes she has also had itching of left eye lately, wanting provider to look at that as well     Noticed spot to left eyebrow 6 weeks ago, thought she hadnt removed all her eye makeup.  Increasing in size slowly.  No pain or itching.  No personal hx of skin cancer.      Left eye itching ongoing for years.  Has told eye doctor, was told to rest her eyes and using hot pack.  No eye drainage or vision changes.  Sometimes feels like sand in the eye.  Has not seen eye doctor in 2 years.  No hx of eye injury or foreign body.  She does think she has allergy symptoms, but symptoms rhinorrhea and sinus congestion.  Eye itching doesn't seem to be seasonal.  No contacts, has not tried eye drops.  No hx of eye surgery.    Patient Active Problem List   Diagnosis     CARDIOVASCULAR SCREENING; LDL GOAL LESS THAN 160     Overweight     Abnormal blood sugar     Closed fracture of middle or proximal phalanx or phalanges of hand     Stiffness of joint, not elsewhere classified, hand     Pain in joint, hand     Other postprocedural status(V45.89)     Depression with anxiety     Atherosclerotic plaque     Chronic right shoulder pain     Coronary artery disease involving native coronary artery of native heart without angina pectoris     Past Surgical History:   Procedure Laterality Date     APPENDECTOMY       BACK SURGERY       C NONSPECIFIC PROCEDURE  1992    laminectomy     CLOSED REDUCTION, PERCUTANEOUS PINNING FINGER, COMBINED   4/6/2012    Procedure:COMBINED CLOSED REDUCTION, PERCUTANEOUS PINNING FINGER; Right Ring Finger Proximal Fracture Closed Reduction Internal Fixation, percutaneous pinning  Choice anesthesia; Surgeon:MILDRED DICKSON; Location: OR       Social History     Tobacco Use     Smoking status: Former Smoker     Packs/day: 0.50     Years: 30.00     Pack years: 15.00     Types: Cigarettes     Last attempt to quit: 2/1/2009     Years since quitting: 10.2     Smokeless tobacco: Never Used   Substance Use Topics     Alcohol use: Yes     Comment: a couple of drinks on the weekend     Family History   Problem Relation Age of Onset     Cancer Mother         lung     Respiratory Mother         Emphysema     Cancer Father         esophagus     Osteoporosis Paternal Grandmother      Lipids Paternal Grandmother      Hypertension Maternal Aunt      Alzheimer Disease Other         fathers mother sister     Connective Tissue Disorder Other         lupus cousin     Depression Maternal Aunt      Respiratory Paternal Aunt         Asthma     Respiratory Other         neice     Respiratory Other         Asthma second cousin         Current Outpatient Medications   Medication Sig Dispense Refill     aspirin 81 MG tablet Take 81 mg by mouth daily       atorvastatin (LIPITOR) 40 MG tablet Take 1 tablet (40 mg) by mouth daily 90 tablet 3     Cholecalciferol (VITAMIN D3 PO) Take by mouth daily       fish oil-omega-3 fatty acids 1000 MG capsule Take 2 g by mouth daily       busPIRone (BUSPAR) 10 MG tablet Take 1 tablet (10 mg) by mouth 3 times daily (Patient not taking: Reported on 5/14/2019) 90 tablet 3       ROS:  Integ, HEENT as above, otherwise negative       OBJECTIVE:                                                    /72 (BP Location: Left arm, Patient Position: Chair, Cuff Size: Adult Regular)   Pulse 82   Temp 97.5  F (36.4  C) (Oral)   Resp 12   SpO2 100%    GENERAL APPEARANCE: healthy, alert and no distress  EYES: Eyes grossly  normal to inspection, PERRL, conjunctivae and sclerae normal and lids and lashes normal.  EOM intact and pain free.  No cobblestone appearance to conjunctiva.    SKIN: under an exam light I do not appreciate any lesion to left eyelid.  I had patient pont out the lesion to me in a mirror, with exam light on eyebrow she did not see it either.  There does appear to be a slight hyperpigmentation to left brow without the exam light on but I think it is a shadow effect from her brow ridge, it disappears with the exam light   PSYCH: mentation appears normal and affect normal/bright        ASSESSMENT/PLAN:                                                    (H57.89) Itch of left eye  (primary encounter diagnosis)  Comment: ongoing years.  no stye on exam.  Would expect bilat presentation if allergic conjunctivitis.  Suspect dry eye  Plan: recommend lubricating drops 3-4 times a day, follow up with eye doctor if not improving in 1 month    (L98.9) Lesion of eyebrow  Comment: I think this was due to a shadow effect of the brow, neither the patient nor myself appreciate a lesion under exam light  Plan: reassured no lesion identified today.  Follow up if noticing additional changes        See Patient Instructions    Marisela Khan, [facility]    Patient Instructions   I can barely see color change to eyelid, I think there is a shadowing effect from your brow.  No concerning findings today but if ongoing changes let me know, will have you se dermatology    For dry eye try saline drops 3-4 times a day, keeping in fridge will help more.  See eye doctor if not improving in 1 month

## 2019-05-14 NOTE — PATIENT INSTRUCTIONS
I can barely see color change to eyelid, I think there is a shadowing effect from your brow.  No concerning findings today but if ongoing changes let me know, will have you se dermatology    For dry eye try saline drops 3-4 times a day, keeping in fridge will help more.  See eye doctor if not improving in 1 month

## 2019-06-20 DIAGNOSIS — R42 DIZZINESS: Primary | ICD-10-CM

## 2019-06-23 NOTE — TELEPHONE ENCOUNTER
"Requested Prescriptions   Pending Prescriptions Disp Refills     meclizine (ANTIVERT) 25 MG tablet [Pharmacy Med Name: MECLIZINE HCL 25MG TABS] 30 tablet 0     Sig: TAKE ONE TABLET BY MOUTH THREE TIMES A DAY AS NEEDED FOR DIZZINESS        Antivertigo/Antiemetic Agents Failed - 6/20/2019  2:48 PM        Failed - Medication is active on med list        Passed - Recent (12 mo) or future (30 days) visit within the authorizing provider's specialty     Patient had office visit in the last 12 months or has a visit in the next 30 days with authorizing provider or within the authorizing provider's specialty.  See \"Patient Info\" tab in inbasket, or \"Choose Columns\" in Meds & Orders section of the refill encounter.              Passed - Patient is 18 years of age or older        Routing refill request to provider for review/approval because:  Drug not active on patient's medication list - I don't see it has ever been prescribed        "

## 2019-06-24 RX ORDER — MECLIZINE HYDROCHLORIDE 25 MG/1
TABLET ORAL
Qty: 30 TABLET | Refills: 0 | Status: SHIPPED | OUTPATIENT
Start: 2019-06-24 | End: 2019-10-15

## 2019-07-29 ENCOUNTER — TELEPHONE (OUTPATIENT)
Dept: FAMILY MEDICINE | Facility: CLINIC | Age: 69
End: 2019-07-29

## 2019-07-29 DIAGNOSIS — R41.89 COGNITIVE CHANGES: ICD-10-CM

## 2019-07-29 DIAGNOSIS — R41.3 MEMORY CHANGES: Primary | ICD-10-CM

## 2019-07-29 NOTE — TELEPHONE ENCOUNTER
Reason for Call:  Other call back    Detailed comments: Patient is requesting a call back before she schedules an appointment. States she would like to discuss some behavioral issues and confusion she has been having on and off x 1 year. Please advise. Thanks!    Phone Number Patient can be reached at: Home number on file [phone] (home)    Best Time: Any    Can we leave a detailed message on this number? YES    Call taken on 7/29/2019 at 12:13 PM by Geetha Berger

## 2019-07-29 NOTE — TELEPHONE ENCOUNTER
Placed neurology referral - discussed with patient and transferred to scheduling    She verbalized understanding    Sri Miguel RN

## 2019-07-29 NOTE — TELEPHONE ENCOUNTER
I talked to pt.  She has had sx for over a year.  She can't add/ subtract with math.  Can't write a letter.  Can't remember conversations.  Memory issues.    Pt is wondering if she should see psychology.    Should  pt see neurology? Please advise on referral direction.  Pt knows she may get a call back tomorrow.  FRANCESCO Cruz

## 2019-09-05 NOTE — PROGRESS NOTES
INITIAL NEUROLOGY CONSULTATION    DATE OF VISIT: 9/6/2019  CLINIC LOCATION: [facility]  MRN: [id number]  PATIENT NAME: Ciera Casas  YOB: 1950    PRIMARY CARE PROVIDER: Sowmya Tinajero PA-C     REASON FOR VISIT:   Chief Complaint   Patient presents with     Memory Loss     starrted to forget things and got anxiety - About a year ago.     HISTORY OF PRESENT ILLNESS:                                                    Ms. Ciera aCsas is 69 year old right handed female patient with past medical history of depression, anxiety, and coronary artery disease, who was seen in consultation today requested by Sowmya Tinajero PA-C/Dr. Dempsey, for cognitive changes.  Unfortunately, the patient came unaccompanied to this visit, and no alternative sources of information are available.    Per patient's report, over the last 2 years she gradually developed cognitive complaints that are getting worse.  She feels that her short-term memory is preferentially affected.  She tends to repeat herself that was commented by her significant other and other people.  She has word finding difficulty.  She has trouble remembering recent conversations and keeping the train of thought.  She tends to misplace her belongings.  She is not able to add or subtract simple calculations.  Cannot write letters any longer due to difficulty with correct spelling.  She also noticed that it is difficult for her to follow recipes.  For example, recently she wanted to bake a zucchini cake and added all of the ingredients, but forgot to put zucchini.  She was late on several bills.  She also wrote checks for wrong amounts.  She tends to forget to take her medications on time and needs reminders from her significant other.  Once she forgot to turn the stove burner off, but now is more careful with that.  Several times she forgot to lock her doors.  No left on lights or water facets.  Did not leave her keys  "outside the doors.  Occasionally, she might see cracks in the paint on the walls or it seems to her that there are \"ropes on the tile floor\" that she needs to step over, but, otherwise, she denies any significant vision, speech, swallowing problems, focal numbness or weakness, or new difficulty with bowel or bladder control.  She also denies previous history of strokes, brain infections, or significant head injuries.    Most recent laboratory evaluation from February 2019 includes unremarkable CMP (except glucose of 104) and normal LDL (78).  Her TSH was normal in April 2018 (0.42).  Vitamin B12 was not tested.    According to Care Everywhere, in January 2019 the patient also had BMP, notable for slightly low sodium and elevated glucose and unremarkable troponin level and CBC.  Brain MRI, head and neck MRA from 01/30/2019, performed for dizziness, were negative for acute intracranial pathology.  Faint enhancement of both ICA's, greater on the right, felt to represent vascular versus infectious/inflammatory process, was seen.  Left temporoparietal encephalomalacia, likely reflecting chronic infarct, along with mild chronic microvascular ischemic changes are also noted.    Review of Systems - the patient endorses heart murmur, sinus problems, anxiety, and urinary urgency.  All of them have been previously discussed with other medical providers. Otherwise, she denies any other complaints on 14-point comprehensive review of systems.  PAST MEDICAL/SURGICAL HISTORY:                                                    I personally reviewed patient's past medical and surgical history with the patient at today's visit.  Patient Active Problem List   Diagnosis     CARDIOVASCULAR SCREENING; LDL GOAL LESS THAN 160     Overweight     Abnormal blood sugar     Closed fracture of middle or proximal phalanx or phalanges of hand     Stiffness of joint, not elsewhere classified, hand     Pain in joint, hand     Other postprocedural " status(V45.89)     Depression with anxiety     Atherosclerotic plaque     Chronic right shoulder pain     Coronary artery disease involving native coronary artery of native heart without angina pectoris     Past Medical History:   Diagnosis Date     Abnormal blood sugar      CAD (coronary artery disease)      CARDIOVASCULAR SCREENING; LDL GOAL LESS THAN 160 5/9/2010     Depression with anxiety      Hepatitis, unspecified 4/2004     Major depression      Overweight(278.02)      Past Surgical History:   Procedure Laterality Date     APPENDECTOMY       BACK SURGERY       C NONSPECIFIC PROCEDURE  1992    laminectomy     CLOSED REDUCTION, PERCUTANEOUS PINNING FINGER, COMBINED  4/6/2012    Procedure:COMBINED CLOSED REDUCTION, PERCUTANEOUS PINNING FINGER; Right Ring Finger Proximal Fracture Closed Reduction Internal Fixation, percutaneous pinning  Choice anesthesia; Surgeon:MILDRED DICKSON; Location:[facility]     MEDICATIONS:                                                    I personally reviewed patient's medications and allergies with the patient at today's visit.    Current Outpatient Medications on File Prior to Visit:  aspirin 81 MG tablet Take 81 mg by mouth daily   atorvastatin (LIPITOR) 40 MG tablet Take 1 tablet (40 mg) by mouth daily   busPIRone (BUSPAR) 10 MG tablet Take 1 tablet (10 mg) by mouth 3 times daily (Patient not taking: Reported on 5/14/2019)   Cholecalciferol (VITAMIN D3 PO) Take by mouth daily   fish oil-omega-3 fatty acids 1000 MG capsule Take 2 g by mouth daily   meclizine (ANTIVERT) 25 MG tablet TAKE ONE TABLET BY MOUTH THREE TIMES A DAY AS NEEDED FOR DIZZINESS     No current facility-administered medications on file prior to visit.   ALLERGIES:                                                      Allergies   Allergen Reactions     Oxybutynin Difficulty breathing     FAMILY/SOCIAL HISTORY:                                                    Family and social history was reviewed with the patient at  "today's visit.   Family history is positive for Alzheimer's disease/dementia.  Former smoker, quit in 2009.  2 alcohol drinks on the weekend.  Denies recreational drug use.  , lives with significant other.  Retired.  REVIEW OF SYSTEMS:                                                    Patient has completed a [facility] Patient Health History, including a 14-system review, which was personally reviewed, and pertinent positives are listed in HPI. She denies any additional problems on the further questioning.  EXAM:                                                    VITAL SIGNS:   /86 (BP Location: Left arm, Patient Position: Sitting, Cuff Size: Adult Regular)   Pulse 89   Temp 97.2  F (36.2  C) (Oral)   Ht 1.626 m (5' 4\")   Wt 81.6 kg (180 lb)   SpO2 98%   BMI 30.90 kg/m    [place]cenio Cognitive Assessment:    Weatherly Cognitive Assessment (MOCA)  Visuospatial/Executive : 2  Naming: 3  Attention - Digits: 2  Attention - Letters: 1  Attention - Subtraction: 1  Language - Repeat: 2  Language - Fluency : 1  Abstraction: 2  Delayed Recall: 1  Orientation: 6  Education: 0  MOCA Score: 21     [place]atherly Cognitive Assessment Score:  MOCA Score: 21/30.     General: pt is in NAD, cooperative.  Skin: normal turgor, moist mucous membranes, no lesions/rashes noticed.  HEENT: ATNC, EOMI, PERRL, white sclera, normal conjunctiva, no nystagmus or ptosis. No carotid bruits bilaterally.  Respiratory: lung sounds clear to auscultation bilaterally, no crackles, wheezes, rhonchi. Symmetric lung excursion, no accessory respiratory muscle use.  Cardiovascular: normal S1/S2, no murmurs/rubs/gallops.   Abdomen: Not distended.  : deferred.    Neurological:  Mental: alert, follows commands, MoCA as per above, no aphasia or dysarthria. Fund of knowledge is diminished for age.  Cranial Nerves:  CN II: visual acuity - able to accurately count fingers with each eye. Visual fields intact, fundi: discs sharp, no " papilledema and normal vessels bilaterally.  CN III, IV, VI: EOM intact, pupils equal and reactive  CN V: facial sensation nl  CN VII: face symmetric, no facial droop  CN VIII: hearing normal  CN IX: palate elevation symmetric, uvula at midline  CN XI SCM normal, shoulder shrug nl  CN XII: tongue midline  Motor: Strength: 5/5 in all major groups of all extremities. Normal tone. No abnormal movements. No pronator drift b/l.  Reflexes: Triceps, biceps, brachioradialis, patellar, and achilles reflexes normal and symmetric. No clonus noted. Toes are down-going b/l.   Sensory: temperature, light touch, pinprick, and vibration intact. Romberg: negative.  Coordination: FNF and heel-shin tests intact b/l.   Gait:  Normal, able to tandem, toe, and heel walk.  DATA:   LABS/IMAGING/OTHER STUDIES: I reviewed pertinent medical records, including Care Everywhere, as detailed in the history of present illness.  ASSESSMENT AND PLAN:      ASSESSMENT: Ciera Casas is a 69 year old female patient with past medical history of depression, anxiety, and coronary artery disease, who presents with cognitive concerns that started approximately 2 years ago.    We had a prolonged discussion with the patient regarding her presenting complaints.  Her cognitive testing today is 21/30, consistent with mild cognitive impairment.  TSH from April 2018 was unremarkable.  No prior B12 or B1 levels.  Brain MRI from January 2019 is unrevealing for the causes of cognitive dysfunction, but demonstrated possible prior stroke and abnormal enhancement of both ICA's.  It would be reasonable to repeat this study now to evaluate for interval resolution of contrast-enhancement and interval changes.  If the appearance of left temporoparietal encephalomalacia is consistent with stroke, she will need additional stroke work-up (TTE and possibly event monitoring).  She is already on aspirin.  I will check hemoglobin A1C today with her other labs.    The clinical  presentation of her cognitive complaints might be consistent with thyroid dysfunction, vitamin deficiencies, cognitive difficulty related to mental health conditions, and neurodegenerative conditions with Alzheimer's disease being most common.  For further diagnostic clarification I ordered screening labs, CPT, and neuropsychologic evaluation.    DIAGNOSES:    ICD-10-CM    1. Cognitive complaints R41.9 NEUROPSYCHOLOGY REFERRAL     OCCUPATIONAL THERAPY REFERRAL     Vitamin B1 whole blood     Methylmalonic Acid     TSH with free T4 reflex     Vitamin B12     MR Brain w/o & w Contrast   2. History of stroke Z86.73 Hemoglobin A1c     MR Brain w/o & w Contrast   3. Hyperglycemia  R73.9 Hemoglobin A1c     PLAN: At today's visit we thoroughly discussed various diagnostic possibilities for patient's symptoms, necessary evaluation, and the plan, which includes:  Orders Placed This Encounter   Procedures     MR Brain w/o & w Contrast     Vitamin B1 whole blood     Methylmalonic Acid     TSH with free T4 reflex     Vitamin B12     Hemoglobin A1C     NEUROPSYCHOLOGY REFERRAL     OCCUPATIONAL THERAPY REFERRAL     No new medications.    I counseled the patient to stay physically and mentally active with particular emphasis on daily mentally stimulating activities of  her choice (such as crosswords, puzzles, sudoku, etc.), stretching exercises, walking, and healthy eating.     Additional recommendations after the work-up.    We might do additional stroke work-up or other testing based on MRI results.    We will follow-up on memory concerns once all of the testing is done, approximately in 2 to 3 months.  I advised the patient to bring her significant other to the next visit.    Next follow-up appointment is in the next 2 weeks or earlier if needed.    Total Time:  82 minutes with > 50% spent counseling the patient on stated above assessment and recommendations, including nature of the diagnosis, needed w/u, and proposed plan.   Additional time was used to answer questions regarding patient's symptoms, my recommendations, and the plan.    Tony Moore MD  / Neurology  [place]  (Chart documentation was completed in part with Dragon voice-recognition software. Even though reviewed, some grammatical, spelling, and word errors may remain.)

## 2019-09-06 ENCOUNTER — OFFICE VISIT (OUTPATIENT)
Dept: NEUROLOGY | Facility: CLINIC | Age: 69
End: 2019-09-06
Payer: COMMERCIAL

## 2019-09-06 VITALS
HEART RATE: 89 BPM | DIASTOLIC BLOOD PRESSURE: 86 MMHG | TEMPERATURE: 97.2 F | HEIGHT: 64 IN | WEIGHT: 180 LBS | OXYGEN SATURATION: 98 % | SYSTOLIC BLOOD PRESSURE: 132 MMHG | BODY MASS INDEX: 30.73 KG/M2

## 2019-09-06 DIAGNOSIS — R73.9 HYPERGLYCEMIA: ICD-10-CM

## 2019-09-06 DIAGNOSIS — R41.9 COGNITIVE COMPLAINTS: Primary | ICD-10-CM

## 2019-09-06 DIAGNOSIS — Z86.73 HISTORY OF STROKE: ICD-10-CM

## 2019-09-06 LAB
HBA1C MFR BLD: 5.3 % (ref 0–5.6)
TSH SERPL DL<=0.005 MIU/L-ACNC: 0.47 MU/L (ref 0.4–4)
VIT B12 SERPL-MCNC: 482 PG/ML (ref 193–986)

## 2019-09-06 PROCEDURE — 99000 SPECIMEN HANDLING OFFICE-LAB: CPT | Performed by: PSYCHIATRY & NEUROLOGY

## 2019-09-06 PROCEDURE — 84443 ASSAY THYROID STIM HORMONE: CPT | Performed by: PSYCHIATRY & NEUROLOGY

## 2019-09-06 PROCEDURE — 36415 COLL VENOUS BLD VENIPUNCTURE: CPT | Performed by: PSYCHIATRY & NEUROLOGY

## 2019-09-06 PROCEDURE — 83921 ORGANIC ACID SINGLE QUANT: CPT | Performed by: PSYCHIATRY & NEUROLOGY

## 2019-09-06 PROCEDURE — 83036 HEMOGLOBIN GLYCOSYLATED A1C: CPT | Performed by: PSYCHIATRY & NEUROLOGY

## 2019-09-06 PROCEDURE — 84425 ASSAY OF VITAMIN B-1: CPT | Mod: 90 | Performed by: PSYCHIATRY & NEUROLOGY

## 2019-09-06 PROCEDURE — 99204 OFFICE O/P NEW MOD 45 MIN: CPT | Performed by: PSYCHIATRY & NEUROLOGY

## 2019-09-06 PROCEDURE — 82607 VITAMIN B-12: CPT | Performed by: PSYCHIATRY & NEUROLOGY

## 2019-09-06 ASSESSMENT — MONTREAL COGNITIVE ASSESSMENT (MOCA)
10. [FLUENCY] NAME WORDS STARTING WITH DESIGNATED LETTER: 1
12. MEMORY INDEX SCORE: 1
9. REPEAT EACH SENTENCE: 2
11. FOR EACH PAIR OF WORDS, WHAT CATEGORY DO THEY BELONG TO (OUT OF 2): 2
6. READ LIST OF DIGITS [FORWARD/BACKWARD]: 2
7. [VIGILENCE] TAP WHEN HEARING DESIGNATED LETTER: 1
VISUOSPATIAL/EXECUTIVE SUBSCORE: 2
4. NAME EACH OF THE THREE ANIMALS SHOWN: 3
WHAT IS THE TOTAL SCORE (OUT OF 30): 21
8. SERIAL SUBTRACTION OF 7S: 1
13. ORIENTATION SUBSCORE: 6
WHAT LEVEL OF EDUCATION WAS ATTAINED: 0

## 2019-09-06 ASSESSMENT — MIFFLIN-ST. JEOR: SCORE: 1326.47

## 2019-09-06 NOTE — LETTER
9/6/2019         RE: Ciera Casas  [address]  [facility] [address]        Dear Colleague,    Thank you for referring your patient, Ciera Casas, to the [facility]. Please see a copy of my visit note below.    INITIAL NEUROLOGY CONSULTATION    DATE OF VISIT: 9/6/2019  CLINIC LOCATION: [facility]  MRN: [id number]  PATIENT NAME: Ciera Casas  YOB: 1950    PRIMARY CARE PROVIDER: Sowmya Tinajero PA-C     REASON FOR VISIT:   Chief Complaint   Patient presents with     Memory Loss     starrted to forget things and got anxiety - About a year ago.     HISTORY OF PRESENT ILLNESS:                                                    Ms. Ciera Casas is 69 year old right handed female patient with past medical history of depression, anxiety, and coronary artery disease, who was seen in consultation today requested by Sowmya Tinajero PA-C/Dr. Dempsey, for cognitive changes.  Unfortunately, the patient came unaccompanied to this visit, and no alternative sources of information are available.    Per patient's report, over the last 2 years she gradually developed cognitive complaints that are getting worse.  She feels that her short-term memory is preferentially affected.  She tends to repeat herself that was commented by her significant other and other people.  She has word finding difficulty.  She has trouble remembering recent conversations and keeping the train of thought.  She tends to misplace her belongings.  She is not able to add or subtract simple calculations.  Cannot write letters any longer due to difficulty with correct spelling.  She also noticed that it is difficult for her to follow recipes.  For example, recently she wanted to bake a zucchini cake and added all of the ingredients, but forgot to put zucchini.  She was late on several bills.  She also wrote checks for wrong amounts.  She tends to forget to take her  "medications on time and needs reminders from her significant other.  Once she forgot to turn the stove burner off, but now is more careful with that.  Several times she forgot to lock her doors.  No left on lights or water facets.  Did not leave her keys outside the doors.  Occasionally, she might see cracks in the paint on the walls or it seems to her that there are \"ropes on the tile floor\" that she needs to step over, but, otherwise, she denies any significant vision, speech, swallowing problems, focal numbness or weakness, or new difficulty with bowel or bladder control.  She also denies previous history of strokes, brain infections, or significant head injuries.    Most recent laboratory evaluation from February 2019 includes unremarkable CMP (except glucose of 104) and normal LDL (78).  Her TSH was normal in April 2018 (0.42).  Vitamin B12 was not tested.    According to Care Everywhere, in January 2019 the patient also had BMP, notable for slightly low sodium and elevated glucose and unremarkable troponin level and CBC.  Brain MRI, head and neck MRA from 01/30/2019, performed for dizziness, were negative for acute intracranial pathology.  Faint enhancement of both ICA's, greater on the right, felt to represent vascular versus infectious/inflammatory process, was seen.  Left temporoparietal encephalomalacia, likely reflecting chronic infarct, along with mild chronic microvascular ischemic changes are also noted.    Review of Systems - the patient endorses heart murmur, sinus problems, anxiety, and urinary urgency.  All of them have been previously discussed with other medical providers. Otherwise, she denies any other complaints on 14-point comprehensive review of systems.  PAST MEDICAL/SURGICAL HISTORY:                                                    I personally reviewed patient's past medical and surgical history with the patient at today's visit.  Patient Active Problem List   Diagnosis     " CARDIOVASCULAR SCREENING; LDL GOAL LESS THAN 160     Overweight     Abnormal blood sugar     Closed fracture of middle or proximal phalanx or phalanges of hand     Stiffness of joint, not elsewhere classified, hand     Pain in joint, hand     Other postprocedural status(V45.89)     Depression with anxiety     Atherosclerotic plaque     Chronic right shoulder pain     Coronary artery disease involving native coronary artery of native heart without angina pectoris     Past Medical History:   Diagnosis Date     Abnormal blood sugar      CAD (coronary artery disease)      CARDIOVASCULAR SCREENING; LDL GOAL LESS THAN 160 5/9/2010     Depression with anxiety      Hepatitis, unspecified 4/2004     Major depression      Overweight(278.02)      Past Surgical History:   Procedure Laterality Date     APPENDECTOMY       BACK SURGERY       C NONSPECIFIC PROCEDURE  1992    laminectomy     CLOSED REDUCTION, PERCUTANEOUS PINNING FINGER, COMBINED  4/6/2012    Procedure:COMBINED CLOSED REDUCTION, PERCUTANEOUS PINNING FINGER; Right Ring Finger Proximal Fracture Closed Reduction Internal Fixation, percutaneous pinning  Choice anesthesia; Surgeon:MILDRED DICKSON; Location: OR     MEDICATIONS:                                                    I personally reviewed patient's medications and allergies with the patient at today's visit.    Current Outpatient Medications on File Prior to Visit:  aspirin 81 MG tablet Take 81 mg by mouth daily   atorvastatin (LIPITOR) 40 MG tablet Take 1 tablet (40 mg) by mouth daily   busPIRone (BUSPAR) 10 MG tablet Take 1 tablet (10 mg) by mouth 3 times daily (Patient not taking: Reported on 5/14/2019)   Cholecalciferol (VITAMIN D3 PO) Take by mouth daily   fish oil-omega-3 fatty acids 1000 MG capsule Take 2 g by mouth daily   meclizine (ANTIVERT) 25 MG tablet TAKE ONE TABLET BY MOUTH THREE TIMES A DAY AS NEEDED FOR DIZZINESS     No current facility-administered medications on file prior to visit.  "  ALLERGIES:                                                      Allergies   Allergen Reactions     Oxybutynin Difficulty breathing     FAMILY/SOCIAL HISTORY:                                                    Family and social history was reviewed with the patient at today's visit.   Family history is positive for Alzheimer's disease/dementia.  Former smoker, quit in 2009.  2 alcohol drinks on the weekend.  Denies recreational drug use.  , lives with significant other.  Retired.  REVIEW OF SYSTEMS:                                                    Patient has completed a [facility] Patient Health History, including a 14-system review, which was personally reviewed, and pertinent positives are listed in HPI. She denies any additional problems on the further questioning.  EXAM:                                                    VITAL SIGNS:   /86 (BP Location: Left arm, Patient Position: Sitting, Cuff Size: Adult Regular)   Pulse 89   Temp 97.2  F (36.2  C) (Oral)   Ht 1.626 m (5' 4\")   Wt 81.6 kg (180 lb)   SpO2 98%   BMI 30.90 kg/m     Arcenio Cognitive Assessment:    Arcenio Cognitive Assessment (MOCA)  Visuospatial/Executive : 2  Naming: 3  Attention - Digits: 2  Attention - Letters: 1  Attention - Subtraction: 1  Language - Repeat: 2  Language - Fluency : 1  Abstraction: 2  Delayed Recall: 1  Orientation: 6  Education: 0  MOCA Score: 21     Arcenio Cognitive Assessment Score:  MOCA Score: 21/30.     General: pt is in NAD, cooperative.  Skin: normal turgor, moist mucous membranes, no lesions/rashes noticed.  HEENT: ATNC, EOMI, PERRL, white sclera, normal conjunctiva, no nystagmus or ptosis. No carotid bruits bilaterally.  Respiratory: lung sounds clear to auscultation bilaterally, no crackles, wheezes, rhonchi. Symmetric lung excursion, no accessory respiratory muscle use.  Cardiovascular: normal S1/S2, no murmurs/rubs/gallops.   Abdomen: Not distended.  : " deferred.    Neurological:  Mental: alert, follows commands, MoCA as per above, no aphasia or dysarthria. Fund of knowledge is diminished for age.  Cranial Nerves:  CN II: visual acuity - able to accurately count fingers with each eye. Visual fields intact, fundi: discs sharp, no papilledema and normal vessels bilaterally.  CN III, IV, VI: EOM intact, pupils equal and reactive  CN V: facial sensation nl  CN VII: face symmetric, no facial droop  CN VIII: hearing normal  CN IX: palate elevation symmetric, uvula at midline  CN XI SCM normal, shoulder shrug nl  CN XII: tongue midline  Motor: Strength: 5/5 in all major groups of all extremities. Normal tone. No abnormal movements. No pronator drift b/l.  Reflexes: Triceps, biceps, brachioradialis, patellar, and achilles reflexes normal and symmetric. No clonus noted. Toes are down-going b/l.   Sensory: temperature, light touch, pinprick, and vibration intact. Romberg: negative.  Coordination: FNF and heel-shin tests intact b/l.   Gait:  Normal, able to tandem, toe, and heel walk.  DATA:   LABS/IMAGING/OTHER STUDIES: I reviewed pertinent medical records, including Care Everywhere, as detailed in the history of present illness.  ASSESSMENT AND PLAN:      ASSESSMENT: Ciera Casas is a 69 year old female patient with past medical history of depression, anxiety, and coronary artery disease, who presents with cognitive concerns that started approximately 2 years ago.    We had a prolonged discussion with the patient regarding her presenting complaints.  Her cognitive testing today is 21/30, consistent with mild cognitive impairment.  TSH from April 2018 was unremarkable.  No prior B12 or B1 levels.  Brain MRI from January 2019 is unrevealing for the causes of cognitive dysfunction, but demonstrated possible prior stroke and abnormal enhancement of both ICA's.  It would be reasonable to repeat this study now to evaluate for interval resolution of contrast-enhancement and  interval changes.  If the appearance of left temporoparietal encephalomalacia is consistent with stroke, she will need additional stroke work-up (TTE and possibly event monitoring).  She is already on aspirin.  I will check hemoglobin A1C today with her other labs.    The clinical presentation of her cognitive complaints might be consistent with thyroid dysfunction, vitamin deficiencies, cognitive difficulty related to mental health conditions, and neurodegenerative conditions with Alzheimer's disease being most common.  For further diagnostic clarification I ordered screening labs, CPT, and neuropsychologic evaluation.    DIAGNOSES:    ICD-10-CM    1. Cognitive complaints R41.9 NEUROPSYCHOLOGY REFERRAL     OCCUPATIONAL THERAPY REFERRAL     Vitamin B1 whole blood     Methylmalonic Acid     TSH with free T4 reflex     Vitamin B12     MR Brain w/o & w Contrast   2. History of stroke Z86.73 Hemoglobin A1c     MR Brain w/o & w Contrast   3. Hyperglycemia  R73.9 Hemoglobin A1c     PLAN: At today's visit we thoroughly discussed various diagnostic possibilities for patient's symptoms, necessary evaluation, and the plan, which includes:  Orders Placed This Encounter   Procedures     MR Brain w/o & w Contrast     Vitamin B1 whole blood     Methylmalonic Acid     TSH with free T4 reflex     Vitamin B12     Hemoglobin A1C     NEUROPSYCHOLOGY REFERRAL     OCCUPATIONAL THERAPY REFERRAL     No new medications.    I counseled the patient to stay physically and mentally active with particular emphasis on daily mentally stimulating activities of   her choice (such as crosswords, puzzles, sudoku, etc.), stretching exercises, walking, and healthy eating.     Additional recommendations after the work-up.    We might do additional stroke work-up or other testing based on MRI results.    We will follow-up on memory concerns once all of the testing is done, approximately in 2 to 3 months.  I advised the patient to bring her significant  other to the next visit.    Next follow-up appointment is in the next 2 weeks or earlier if needed.    Total Time:  82 minutes with > 50% spent counseling the patient on stated above assessment and recommendations, including nature of the diagnosis, needed w/u, and proposed plan.  Additional time was used to answer questions regarding patient's symptoms, my recommendations, and the plan.    Tony Moore MD  / Neurology  [place]  (Chart documentation was completed in part with Dragon voice-recognition software. Even though reviewed, some grammatical, spelling, and word errors may remain.)            Again, thank you for allowing me to participate in the care of your patient.        Sincerely,        Tony Moore MD

## 2019-09-06 NOTE — PATIENT INSTRUCTIONS
AFTER VISIT SUMMARY (AVS):    At today's visit we thoroughly discussed various diagnostic possibilities for your symptoms, necessary evaluation, and the plan, which includes:  Orders Placed This Encounter   Procedures     MR Brain w/o & w Contrast     Vitamin B1 whole blood     Methylmalonic Acid     TSH with free T4 reflex     Vitamin B12     Hemoglobin A1c     NEUROPSYCHOLOGY REFERRAL     OCCUPATIONAL THERAPY REFERRAL     No new medications.    Stay physically and mentally active with particular emphasis on daily mentally stimulating activities of your choice (such as crosswords, puzzles, sudoku, etc.), stretching exercises, walking, and healthy eating.     Additional recommendations after the work-up.    We might do additional stroke work-up or other testing based on MRI results.    We will follow-up on memory concerns once all of the testing is done, approximately in 2 to 3 months.  Please bring your significant other to the next visit.    Next follow-up appointment is in the next 2 weeks or earlier if needed.    Please do not hesitate to call me with any questions or concerns.    Thanks.

## 2019-09-08 ENCOUNTER — HOSPITAL ENCOUNTER (EMERGENCY)
Facility: CLINIC | Age: 69
Discharge: HOME | End: 2019-09-08
Attending: EMERGENCY MEDICINE | Admitting: EMERGENCY MEDICINE
Payer: COMMERCIAL

## 2019-09-08 ENCOUNTER — APPOINTMENT (OUTPATIENT)
Dept: MRI IMAGING | Facility: CLINIC | Age: 69
End: 2019-09-08
Attending: EMERGENCY MEDICINE
Payer: COMMERCIAL

## 2019-09-08 VITALS
HEART RATE: 98 BPM | OXYGEN SATURATION: 98 % | BODY MASS INDEX: 30.04 KG/M2 | SYSTOLIC BLOOD PRESSURE: 116 MMHG | TEMPERATURE: 97.5 F | RESPIRATION RATE: 18 BRPM | WEIGHT: 175 LBS | DIASTOLIC BLOOD PRESSURE: 93 MMHG

## 2019-09-08 DIAGNOSIS — R20.2 NUMBNESS AND TINGLING IN LEFT ARM: ICD-10-CM

## 2019-09-08 DIAGNOSIS — R20.0 NUMBNESS AND TINGLING IN LEFT ARM: ICD-10-CM

## 2019-09-08 LAB
ALBUMIN SERPL-MCNC: 4.1 G/DL (ref 3.4–5)
ALBUMIN UR-MCNC: NEGATIVE MG/DL
ALP SERPL-CCNC: 96 U/L (ref 40–150)
ALT SERPL W P-5'-P-CCNC: 31 U/L (ref 0–50)
ANION GAP SERPL CALCULATED.3IONS-SCNC: 14 MMOL/L (ref 3–14)
APPEARANCE UR: CLEAR
AST SERPL W P-5'-P-CCNC: 21 U/L (ref 0–45)
BACTERIA #/AREA URNS HPF: ABNORMAL /HPF
BASOPHILS # BLD AUTO: 0.1 10E9/L (ref 0–0.2)
BASOPHILS NFR BLD AUTO: 0.8 %
BILIRUB SERPL-MCNC: 0.6 MG/DL (ref 0.2–1.3)
BILIRUB UR QL STRIP: NEGATIVE
BUN SERPL-MCNC: 15 MG/DL (ref 7–30)
CALCIUM SERPL-MCNC: 9.3 MG/DL (ref 8.5–10.1)
CHLORIDE SERPL-SCNC: 106 MMOL/L (ref 94–109)
CO2 SERPL-SCNC: 21 MMOL/L (ref 20–32)
COLOR UR AUTO: ABNORMAL
CREAT SERPL-MCNC: 0.84 MG/DL (ref 0.52–1.04)
DIFFERENTIAL METHOD BLD: NORMAL
EOSINOPHIL # BLD AUTO: 0.3 10E9/L (ref 0–0.7)
EOSINOPHIL NFR BLD AUTO: 3.8 %
ERYTHROCYTE [DISTWIDTH] IN BLOOD BY AUTOMATED COUNT: 12.8 % (ref 10–15)
GFR SERPL CREATININE-BSD FRML MDRD: 71 ML/MIN/{1.73_M2}
GLUCOSE SERPL-MCNC: 136 MG/DL (ref 70–99)
GLUCOSE UR STRIP-MCNC: NEGATIVE MG/DL
HCT VFR BLD AUTO: 43.4 % (ref 35–47)
HGB BLD-MCNC: 14.3 G/DL (ref 11.7–15.7)
HGB UR QL STRIP: NEGATIVE
IMM GRANULOCYTES # BLD: 0 10E9/L (ref 0–0.4)
IMM GRANULOCYTES NFR BLD: 0.1 %
KETONES UR STRIP-MCNC: NEGATIVE MG/DL
LEUKOCYTE ESTERASE UR QL STRIP: ABNORMAL
LYMPHOCYTES # BLD AUTO: 3.2 10E9/L (ref 0.8–5.3)
LYMPHOCYTES NFR BLD AUTO: 43.1 %
MCH RBC QN AUTO: 31.9 PG (ref 26.5–33)
MCHC RBC AUTO-ENTMCNC: 32.9 G/DL (ref 31.5–36.5)
MCV RBC AUTO: 97 FL (ref 78–100)
MONOCYTES # BLD AUTO: 0.5 10E9/L (ref 0–1.3)
MONOCYTES NFR BLD AUTO: 7.4 %
MUCOUS THREADS #/AREA URNS LPF: PRESENT /LPF
NEUTROPHILS # BLD AUTO: 3.3 10E9/L (ref 1.6–8.3)
NEUTROPHILS NFR BLD AUTO: 44.8 %
NITRATE UR QL: NEGATIVE
NRBC # BLD AUTO: 0 10*3/UL
NRBC BLD AUTO-RTO: 0 /100
PH UR STRIP: 6.5 PH (ref 5–7)
PLATELET # BLD AUTO: 320 10E9/L (ref 150–450)
POTASSIUM SERPL-SCNC: 3.4 MMOL/L (ref 3.4–5.3)
PROT SERPL-MCNC: 8 G/DL (ref 6.8–8.8)
RBC # BLD AUTO: 4.48 10E12/L (ref 3.8–5.2)
RBC #/AREA URNS AUTO: 1 /HPF (ref 0–2)
SODIUM SERPL-SCNC: 141 MMOL/L (ref 133–144)
SOURCE: ABNORMAL
SP GR UR STRIP: 1.01 (ref 1–1.03)
SQUAMOUS #/AREA URNS AUTO: 1 /HPF (ref 0–1)
TROPONIN I SERPL-MCNC: <0.015 UG/L (ref 0–0.04)
UROBILINOGEN UR STRIP-MCNC: NORMAL MG/DL (ref 0–2)
WBC # BLD AUTO: 7.3 10E9/L (ref 4–11)
WBC #/AREA URNS AUTO: 3 /HPF (ref 0–5)

## 2019-09-08 PROCEDURE — A9585 GADOBUTROL INJECTION: HCPCS | Performed by: EMERGENCY MEDICINE

## 2019-09-08 PROCEDURE — 93005 ELECTROCARDIOGRAM TRACING: CPT | Performed by: EMERGENCY MEDICINE

## 2019-09-08 PROCEDURE — 70553 MRI BRAIN STEM W/O & W/DYE: CPT

## 2019-09-08 PROCEDURE — 96360 HYDRATION IV INFUSION INIT: CPT | Performed by: EMERGENCY MEDICINE

## 2019-09-08 PROCEDURE — 25500064 ZZH RX 255 OP 636: Performed by: EMERGENCY MEDICINE

## 2019-09-08 PROCEDURE — 93010 ELECTROCARDIOGRAM REPORT: CPT | Mod: Z6 | Performed by: EMERGENCY MEDICINE

## 2019-09-08 PROCEDURE — 99285 EMERGENCY DEPT VISIT HI MDM: CPT | Mod: 25 | Performed by: EMERGENCY MEDICINE

## 2019-09-08 PROCEDURE — 85025 COMPLETE CBC W/AUTO DIFF WBC: CPT | Performed by: EMERGENCY MEDICINE

## 2019-09-08 PROCEDURE — 87086 URINE CULTURE/COLONY COUNT: CPT | Performed by: EMERGENCY MEDICINE

## 2019-09-08 PROCEDURE — 70544 MR ANGIOGRAPHY HEAD W/O DYE: CPT

## 2019-09-08 PROCEDURE — 84484 ASSAY OF TROPONIN QUANT: CPT | Performed by: EMERGENCY MEDICINE

## 2019-09-08 PROCEDURE — 70549 MR ANGIOGRAPH NECK W/O&W/DYE: CPT

## 2019-09-08 PROCEDURE — 25000128 H RX IP 250 OP 636: Performed by: EMERGENCY MEDICINE

## 2019-09-08 PROCEDURE — 80053 COMPREHEN METABOLIC PANEL: CPT | Performed by: EMERGENCY MEDICINE

## 2019-09-08 PROCEDURE — 81001 URINALYSIS AUTO W/SCOPE: CPT | Performed by: EMERGENCY MEDICINE

## 2019-09-08 RX ORDER — GADOBUTROL 604.72 MG/ML
10 INJECTION INTRAVENOUS ONCE
Status: COMPLETED | OUTPATIENT
Start: 2019-09-08 | End: 2019-09-08

## 2019-09-08 RX ADMIN — GADOBUTROL 8 ML: 604.72 INJECTION INTRAVENOUS at 14:46

## 2019-09-08 RX ADMIN — SODIUM CHLORIDE 100 ML: 9 INJECTION, SOLUTION INTRAVENOUS at 14:46

## 2019-09-08 ASSESSMENT — ENCOUNTER SYMPTOMS
NAUSEA: 1
CONFUSION: 1
WEAKNESS: 1
SHORTNESS OF BREATH: 0
TREMORS: 1
HEADACHES: 1
COUGH: 1
NUMBNESS: 1

## 2019-09-08 NOTE — DISCHARGE INSTRUCTIONS
TODAY'S VISIT:  You were seen today for numbness tingling in your left arm.  Your MRI today is reassuring.  I spoke with the stroke neurology . she does not think we need further evaluation.  I spoke with general neurology, recommended possible imaging of your cervical spine or EEG as an outpatient.  Your labs look fine.  -   - If you had any labs or imaging/radiology tests performed today, you should also discuss these tests with your usual provider.     FOLLOW-UP:  Please make an appointment to follow up with:  - Your Primary Care Provider in 2-3 for follow-up days from this ER visit.  They may have additional tests they would like to run, they can review work-up that you have had so far with you.  Would also recommend you schedule follow-up with your neurologist that you are already working with.    - Have your provider review the results from today's visit with you again to make sure no further follow-up or additional testing is needed based on those results.     PRESCRIPTIONS / MEDICATIONS:  -Continue your home medications as you are already taking them      RETURN TO THE EMERGENCY DEPARTMENT  Return to the Emergency Department at any time for any new or worsening symptoms or any concerns.

## 2019-09-08 NOTE — ED AVS SNAPSHOT
[facility], [place], Emergency Department  [address]  [facility] [address]  Phone:  [phone]  Fax:  [phone]                                    Ciera Casas   MRN: [id number]    Department:  [facility], Emergency Department   Date of Visit:  9/8/2019           After Visit Summary Signature Page    I have received my discharge instructions, and my questions have been answered. I have discussed any challenges I see with this plan with the nurse or doctor.    ..........................................................................................................................................  Patient/Patient Representative Signature      ..........................................................................................................................................  Patient Representative Print Name and Relationship to Patient    ..................................................               ................................................  Date                                   Time    ..........................................................................................................................................  Reviewed by Signature/Title    ...................................................              ..............................................  Date                                               Time          22EPIC Rev 08/18

## 2019-09-08 NOTE — ED PROVIDER NOTES
"    [facility] [place] [facility] ([facility])     September 8, 2019    History     Chief Complaint   Patient presents with     Shortness of Breath     RR 24-28, pursed lip breathing at times, \"feel shakey\" and weak; has one episode of shakiness per day she reports     Numbness     Left arm numbness gone now, onset upon arrival to ED; tingling comes and goes in left arm     HPI  Ciera Casas is a 69 year old left-hand-dominant female with a history of CAD, hepatitis, depression, and anxiety who presents to the ED for evaluation of numbness and tingling in her left arm. Patient reports she has not been feeling good the last couple of days. She states she has at least one episode of tingling in her left arm per day, and these episodes last for about 5-10 minutes. Patient states she has been having these episodes more frequently since Friday (9/6/19), and today was the worst. She states she felt her symptoms come on, so she went with her  to [facility] so she wouldn't be alone at home. When she got out of the car to walk, she states the tingling in her left arm became worse and her arm was shaking.  No reported associated weakness.  She reports the tingling is only below the elbow (possibly only involving the thumb and pointer finger though this is difficult to discern). When she feels it come on, she lays in bed and rests. She also complains of a headache and nausea with these episodes. She notes she has a little cough. She denies chest pain, shortness of breath, or symptoms in her lower extremities. Of note, patient reports she hasn't felt good since May. She states she was treated for anxiety at that time. Since May, she has been forgetting things and having difficulty finding words. She also gets confused and forgets what she is talking about mid conversation.  She discussed that she is found herself throwing out important paperwork, and concerned about other cognitive deficits.    PAST MEDICAL " HISTORY  Past Medical History:   Diagnosis Date     Abnormal blood sugar      CAD (coronary artery disease)      CARDIOVASCULAR SCREENING; LDL GOAL LESS THAN 160 5/9/2010     Depression with anxiety      Hepatitis, unspecified 4/2004     Major depression      Overweight(278.02)      PAST SURGICAL HISTORY  Past Surgical History:   Procedure Laterality Date     APPENDECTOMY       BACK SURGERY       C NONSPECIFIC PROCEDURE  1992    laminectomy     CLOSED REDUCTION, PERCUTANEOUS PINNING FINGER, COMBINED  4/6/2012    Procedure:COMBINED CLOSED REDUCTION, PERCUTANEOUS PINNING FINGER; Right Ring Finger Proximal Fracture Closed Reduction Internal Fixation, percutaneous pinning  Choice anesthesia; Surgeon:MILDRED DICKSON; Location: OR     FAMILY HISTORY  Family History   Problem Relation Age of Onset     Cancer Mother         lung     Respiratory Mother         Emphysema     Cancer Father         esophagus     Osteoporosis Paternal Grandmother      Lipids Paternal Grandmother      Hypertension Maternal Aunt      Alzheimer Disease Other         fathers mother sister     Connective Tissue Disorder Other         lupus cousin     Depression Maternal Aunt      Respiratory Paternal Aunt         Asthma     Respiratory Other         neice     Respiratory Other         Asthma second cousin     SOCIAL HISTORY  Social History     Tobacco Use     Smoking status: Former Smoker     Packs/day: 0.50     Years: 30.00     Pack years: 15.00     Types: Cigarettes     Last attempt to quit: 2/1/2009     Years since quitting: 10.6     Smokeless tobacco: Never Used   Substance Use Topics     Alcohol use: Yes     Comment: a couple of drinks on the weekend     MEDICATIONS  No current facility-administered medications for this encounter.      Current Outpatient Medications   Medication     aspirin 81 MG tablet     atorvastatin (LIPITOR) 40 MG tablet     Cholecalciferol (VITAMIN D3 PO)     fish oil-omega-3 fatty acids 1000 MG capsule     meclizine  (ANTIVERT) 25 MG tablet     ALLERGIES  Allergies   Allergen Reactions     Oxybutynin Difficulty breathing       I have reviewed the Medications, Allergies, Past Medical and Surgical History, and Social History in the Epic system.    Review of Systems   Respiratory: Positive for cough. Negative for shortness of breath.    Cardiovascular: Negative for chest pain.   Gastrointestinal: Positive for nausea.   Neurological: Positive for tremors (left arm), weakness (left hand), numbness (left arm) and headaches.   Psychiatric/Behavioral: Positive for confusion.   All other systems reviewed and are negative.      Physical Exam   BP: (!) 152/90  Pulse: 114  Heart Rate: 98  Temp: 97.5  F (36.4  C)  Resp: 20  Weight: 79.4 kg (175 lb)  SpO2: 100 %      Physical Exam     General: awake, alert, patient does appear anxious  Head: normal cephalic  HEENT: pupils equal, conjugate gaze in tact  Neck: Supple  CV: Initially tachycardic rate, no murmurs appreciated  Lungs: clear to ascultation  Abd: soft, non-tender, no guarding, no peritoneal signs  EXT: lower extremities without swelling or edema  Neuro: awake, answers questions appropriately.  Cranial nerves II through XII intact.  Patient has 5 out of 5 strength in her bilateral upper and lower extremities.  Patient has normal visual fields.  Patient has no neglect on exam.  Sensation light touch and touch bilateral extremities.  Patient has 5 out of 5  strength, normal pinky opposition and thumb opposition and interosseous muscle strength on the affected hand.       ED Course        Procedures       11:52 PM  The patient was seen and examined by Dr. Groves in Room 01.        EKG Interpretation:      Interpreted by Rikki Groves MD  Time reviewed: 11:52  Symptoms at time of EKG: numbness and tingling in left arm  Rhythm: sinus tachycardia  Rate: 121 bpm  Axis: Normal  Ectopy: none  Conduction: normal  ST Segments/ T Waves: No ST-T wave changes, No acute ischemic changes and  "Non-specific ST-T wave changes  Q Waves: none  Comparison to prior: Unchanged    Clinical Impression: No sign of acute ischemia. Sinus tachycardia.         Labs Ordered and Resulted from Time of ED Arrival Up to the Time of Departure from the ED   COMPREHENSIVE METABOLIC PANEL - Abnormal; Notable for the following components:       Result Value    Glucose 136 (*)     All other components within normal limits   ROUTINE UA WITH MICROSCOPIC - Abnormal; Notable for the following components:    Leukocyte Esterase Urine Small (*)     Bacteria Urine Few (*)     Mucous Urine Present (*)     All other components within normal limits   CBC WITH PLATELETS DIFFERENTIAL   TROPONIN I            Assessments & Plan (with Medical Decision Making)   There is a 69-year-old female past medical history significant for CAD, anxiety, depression who presents with complaints of confusion, losing her train of thought and intermittent tingling in her left arm.  On physical exam she is mildly tachycardic, otherwise has stable vital signs.  Of note, the initial triage nurse was worried about patient doing \"pursed lipped\" breathing but patient reports she was focusing on her breathing to try to control her anxiety of the situation and denies any shortness of breath.  She has a normal lung exam and is satting 100% on room air.    Patient endorses numbness tingling in her left upper extremity, she does not report any associated weakness.  Patient is somewhat of a poor historian.  Of note she was seen by neurology 2 days ago and specifically denied numbness tingling to her neurologist; this appointment was to evaluate her confusion that she noted has been ongoing since May.    Differential would include things like ACS I think less likely given lack of chest pain, would also consider some type of electrolyte abnormality, vitamin deficiency, or TIA as potential life-threatening causes of her symptoms.    Per chart review her neurologist (2 days ago) "  checked her vitamin B12 which was normal, TSH was normal.  Will do basic labs, and MRI/MRA of brain to rule out stroke or assess risk for TIA.    EKG shows sinus tachycardia no signs of acute ischemia, troponin negative; given lack of chest pain and constellation of other neurologic symptoms I have very low suspicion this represents ACS and do not feel that further ACS evaluation is warranted today.    UA showed small leukocyte esterase and bacteria, culture pending.  Patient had normal CBC, normal electrolytes.  MRA showed normal MR angiogram and MR brain with and without contrast showed no acute abnormality.    I did discuss the case with general neurology felt outpatient EEG and cervical spine imaging could be warranted on a nonemergent basis, also discussed case with stroke neurology who agreed the case on a very low suspicion for TIA as an underlying etiology.  In addition patient recently had an echo, EKG shows normal sinus rhythm, and is already on a statin and aspirin did not feel that there is much more TIA work-up to have been done regardless.  Patient was encouraged to follow-up with PCP in 2 to 3 days regarding her ER visit, follow-up with outpatient neurology and return to the ER for any new or worsening symptoms.    I have reviewed the nursing notes.    I have reviewed the findings, diagnosis, plan and need for follow up with the patient.    Discharge Medication List as of 9/8/2019  5:09 PM          Final diagnoses:   Numbness and tingling in left arm     IJessica, am serving as a trained medical scribe to document services personally performed by Rikki Groves MD, based on the provider's statements to me.      IRikki MD, was physically present and have reviewed and verified the accuracy of this note documented by Jessica Ryan.    9/8/2019   [facility], [place], EMERGENCY DEPARTMENT     Rikki Groves MD  09/11/19 1475

## 2019-09-08 NOTE — ED NOTES
Patient denies shortness of breath, numbness or any pain. States that she feels great and would like to go home.

## 2019-09-09 ENCOUNTER — TELEPHONE (OUTPATIENT)
Dept: FAMILY MEDICINE | Facility: CLINIC | Age: 69
End: 2019-09-09

## 2019-09-09 LAB
BACTERIA SPEC CULT: NORMAL
BACTERIA SPEC CULT: NORMAL
INTERPRETATION ECG - MUSE: NORMAL
Lab: NORMAL
SPECIMEN SOURCE: NORMAL
VIT B1 BLD-MCNC: 74 NMOL/L (ref 70–180)

## 2019-09-09 NOTE — TELEPHONE ENCOUNTER
Reason for Call:  Other call back    Detailed comments: Patient is wondering when or if she should have a repeat MRI - asked if the that one she had Sunday was sufficient or not. Please advise. Thanks!    Phone Number Patient can be reached at: Home number on file [phone] (home)    Best Time: Any    Can we leave a detailed message on this number? YES    Call taken on 9/9/2019 at 11:07 AM by Geetha Berger

## 2019-09-09 NOTE — RESULT ENCOUNTER NOTE
Final urine culture report is NEGATIVE per [facility] Lab Result protocol.    If NEGATIVE result, no change in treatment, per [facility] ED Lab Result protocol.

## 2019-09-09 NOTE — TELEPHONE ENCOUNTER
Pt had a brain MRI in ER yest.  Dr. Moore had ordered a brain MRI on 9/6/19.    Dr. Moore- please advise.  FRANCESCO Cruz

## 2019-09-10 ENCOUNTER — TELEPHONE (OUTPATIENT)
Dept: NEUROLOGY | Facility: CLINIC | Age: 69
End: 2019-09-10

## 2019-09-10 NOTE — TELEPHONE ENCOUNTER
FYI patient has an MRI scheduled on 9/12 & is wondering if this appt is still needed. Please advise, thank you!    Sushila De La Cruz

## 2019-09-12 ENCOUNTER — TELEPHONE (OUTPATIENT)
Dept: NEUROLOGY | Facility: CLINIC | Age: 69
End: 2019-09-12

## 2019-09-12 LAB — METHYLMALONATE SERPL-SCNC: 0.16 UMOL/L (ref 0–0.4)

## 2019-09-19 ENCOUNTER — HOSPITAL ENCOUNTER (OUTPATIENT)
Dept: OCCUPATIONAL THERAPY | Facility: CLINIC | Age: 69
Setting detail: THERAPIES SERIES
End: 2019-09-19
Attending: PSYCHIATRY & NEUROLOGY
Payer: COMMERCIAL

## 2019-09-19 DIAGNOSIS — R41.9 COGNITIVE COMPLAINTS: ICD-10-CM

## 2019-09-19 PROCEDURE — 97535 SELF CARE MNGMENT TRAINING: CPT | Mod: GO | Performed by: OCCUPATIONAL THERAPIST

## 2019-09-19 PROCEDURE — 96125 COGNITIVE TEST BY HC PRO: CPT | Mod: GO | Performed by: OCCUPATIONAL THERAPIST

## 2019-09-20 NOTE — PROGRESS NOTES
09/19/19 1300   Quick Adds   Type of Visit Initial Outpatient Occupational Therapy Evaluation   General Information   Start Of Care Date 09/19/19   Referring Physician Tony Moore MD   Orders Evaluate and treat as indicated   Orders Date 09/06/19   Medical Diagnosis Cognitive complaints   Onset of Illness/Injury or Date of Surgery 09/06/19  (Date of MD orders)   Surgical/Medical History Reviewed Yes   Additional Occupational Profile Info/Pertinent History of Current Problem Pt is 70 yo female  with pmhx of depression, anxiety and coronary artery disease. Pt could not remember why she was at her therapy appointment and did not recall any difficulties with memory, but when I outlined points from MD note from 9/6 regarding pts self reported deficits she agreed that she has increased difficulty with short term memory, repeats herself to famly members, has word finding difficulty, has trouble remembering recent conversations and keeping train of thought.  She tends to misplace her belongs. Basic math is difficult and had difficulty with spelling words. Pt is having difficulty following a recipe. Pt is forgetting to take pills.  She is writing checks for wrong amounts when trying to pay her bills.    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   Role/Living Environment Comments Pt lives with S.O, but he did not stay for assessment.  Pt reports he drives her to appointments.   Patient/family Goals Statement Pt concerned about CPT score becoming part of her  permanent record.   Pain   Patient currently in pain No   Fall Risk Screen   Fall screen completed by OT   Have you fallen 2 or more times in the past year? No   Have you fallen and had an injury in the past year? No   Is patient a fall risk? No   Abuse Screen (yes response referral indicated)   Feels Unsafe at Home or Work/School no   Feels Threatened by Someone no   Does Anyone Try to Keep You From Having Contact with Others or Doing Things Outside Your Home? no   Physical Signs of Abuse Present no   Cognitive Status Examination   Orientation Person;Place   Level of Consciousness Alert   Follows Commands and Answers Questions 100% of the time   Memory Impaired   Memory Comments Per patient report, and the way she presented during cognitive evaluation   Cognitive Comment Pt completed six task Cognitive Performance Test with total average score of 4.3, scoring at a 4.0 cognitive functional level. Recommendations for patient to live with someone or assisted living.   See separate CPT note for further details.    Bathing   Level of [place] - Bathing independent   Upper Body Dressing   Level of [place]: Dress Upper Body independent   Lower Body Dressing   Level of [place]: Dress Lower Body independent   Toileting   Level of [place]: Toilet independent   Grooming   Level of [place]: Grooming independent   Eating/Self-Feeding   Level of [place]: Eating independent   Planned Therapy Interventions   Planned Therapy Interventions ADL training;IADL training;Cognitive performance testing;Self care/Home management    OT Goal 1   Goal Identifier CPT education   Goal Description Patient and family to verbalize understanding of Cognitive Performance Test results and identify 3 strategies to increase patient's safety and independence in the home setting.   Target Date 11/15/19    OT Goal 2   Goal Identifier Memory compensation strategies   Goal Description Patient to utilize memory tools (cayla  calendar, etc.) and verbalize and demonstrate  independence with problem solving and memory compensation techniques for increased safety and independence with ADLs/IADLs and ability to manage time, appointments, daily schedule, etc   Target Date 11/15/19    OT Goal 3   Goal Identifier Safety with kitchen task   Goal Description Patient will demonstrate the ability to cook a moderately complex meal prep task using the stove and/or oven with modified independence for increased safety and independence at home.   Target Date 11/15/19   Clinical Impression   Criteria for Skilled Therapeutic Interventions Met Yes, treatment indicated   OT Diagnosis decreased adls/iadls   Influenced by the following impairments decreased insight into deficits, forgetfulness,  cognitive decline,    Assessment of Occupational Performance 1-3 Performance Deficits   Identified Performance Deficits decreased safety, impaired ability for paying bills/setting up medications, decreased ability to follow recipe, unable to write letters due to mispelling of words.   Clinical Decision Making (Complexity) Low complexity   Therapy Frequency 1x/week   Predicted Duration of Therapy Intervention (days/wks) six sessions over a two month time frame   Risks and Benefits of Treatment have been explained. Yes   Patient, Family & other staff in agreement with plan of care Yes   Clinical Impression Comments Pt will benefit from OT services to address the above goals   Education Assessment   Barriers To Learning Cognitive   Preferred Learning Style Listening;Reading;Demonstration

## 2019-09-20 NOTE — PROGRESS NOTES
Cognitive Performance Test    SUMMARY OF TEST:    The Cognitive Performance Test (CPT) is a standardized performance-based assessment to measure working memory/executive function processing capacities that underlie functional performance. Subtasks include common basic and instrumental activities of daily living (ADL/IADL) which are rated based on the manner in which patients respond to task demands of varying complexity. The total CPT score describes a level of functioning that indicates how information is processed, implications for functional activities, potential safety risks and a recommended level of supervision or assist based on cognitive function. The highest total score on this test is in the range of 5.6 to 5.8.    DATE OF TESTIN2019    RESULTS OF TESTING:                                                                                         CPT Subtest Results    MEDBOX: 4.5/6 SHOP/GLOVES: 4.5/6 PHONE:    WASH:   TRAVEL:  TOAST:    DRESS:    TOTAL CPT SCORE:  26/33     Average CPT Score  4.33/5.5    INTERPRETATION OF TEST RESULTS:    Based on the Cognitive Performance Test, this patient scored at CPT Level 4.0.  See CPT Levels reference below.    Summary of functional cognitive status:   Level 4.0, Moderate functional decline: unable to complete complex daily tasks an difficulty with self-care tasks. Shows decreased initiation and completion of self-cares(for example, re-wearing clothes). Shows significant decline with memory, and an inability to plan, reason and maintain own safety. New learning is limited. Become unaware of time and place they are at.     Pt required concrete directions in medbox, wash, phone tasks.  Pt unable to pay specified amount and was confused when using money during shop task.  Pt did not choose to take soap for washing hands but instead chose a comb and toothbrush.  Pt able to find a paint store in phone book but unable to follow through and actually dial  a number without concrete direction. Pt unable to correct errors with medications with specific cues.     Factors affecting performance:  No additional problems noted    Recommendations:    Supervision in living settin hour  Live with someone with ability to be alone parts of the day-ie assisted living, or living with family member.                                                     TIME ADMINISTERING TEST: 70    TIME FOR INTERPRETATION AND PREPARATION OF REPORT: 20    TOTAL TIME: 90      CPT Levels Reference:    Patient's Average CPT Score:  4.33                                                                                                                                                  Individual scores range along a continuum as outlined below.  In addition to cognitive status, other factors may affect safety in a home environment.  Please refer to specific recommendations for this patient.    ___5.6-5.8  Normal functioning (absence of cognitive-functional disability).  Independent in managing personal affairs, monitors and directs own behavior.  Uses complex information to carry out daily activities with safety and accuracy.    Proficient with instrumental activities of daily living (IADL) and learning new activity.  Problems are anticipated, errors are avoided, and consequences of actions are considered.      ___5.0   Mild cognitive-functional disability; deficits in working memory and executive thought processes. Difficulty using complex information. Problems may be observed with recent memory, judgment, reasoning and planning ahead. May be impulsive or have difficulty anticipating consequences.  Safety:  May require assistance to plan ahead; or to manage complex medication schedules, appointments or finances.  Hazardous activities may need to be monitored or limited.  ADL:  Mild functional decline.  Able to complete basic self-care and routine household tasks.  May have difficulty with complex daily  "tasks such as reading, writing, meal preparation, shopping or driving.   Learns through hands on teaching. Self-centered behavior or difficulty considering the needs of others may be seen related to trouble seeing the  whole picture\". Can appear disorganized or uninhibited.    ___4.5  Mild to moderate cognitive-functional disability. Significant deficits in working memory and executive thought processes. Judgment, reasoning and planning show obvious impairment.  Distractible with inability to shift attention/actions given competing stimuli.  Difficulty with problem solving and managing details. Complex daily tasks performed with inconsistency, difficulty, or error.     Safety:  Medications should be monitored, stove use may require supervision, and driving ability may be affected.  Impaired safety awareness with inability to anticipate potential problems.  May not recognize or respond to emergent situations. Requires frequent check-in support.   ADL:  Mild difficulty with simple everyday self-care tasks. Benefits from structured, routine activity.  Will likely need reminders to complete tasks outside of the routine. Requires assistance with planning and IADL tasks like shopping and finances. Learns concrete tasks through repetition, but performance may not generalize. Tends to be impulsive with poor insight. Self centered behavior or inability to consider the needs of others is common.    xx___4.0  Moderate cognitive-functional disability; abstract to concrete thought processes. Working memory and executive function impairments are obvious. Difficulty with planning and problem solving.  Behavior is goal-directed, but unable to follow multi-step directions, is easily distracted, and may not recognize mistakes.  Inability to anticipate hazards or understand precautions.  Safety:  Recommend 24-hour supervision for safety. Supervision needed for medication management and for hazardous activities. May not be able to " follow a restricted diet. Can get lost in unfamiliar surroundings. Generally, persons functioning at level 4 should not be driving.   ADL:  Some decline in quality or frequency of ADL.  [place] enhanced by use of a routine, simple concrete directions, and caregiver set-up of needed items. Complex tasks such as money or home management typically requires assistance.  Relies heavily on vision to guide behavior; will ignore objects/hazards not in plain sight and can be distracted by irrelevant objects. Often has poor insight.  Able to carry out social conversation and may verbally  cover  for deficits leading caregivers to believe they are capable of functioning independently.       ___3.5  Moderate cognitive-functional disability; increased cues needed for task completion. Aware of concrete task steps but needs prompting or cues to initiate and complete simple tasks. Attention span is limited, simple directions may need to be repeated, and re-focus to a topic or task may be required.  Safety:  24-hour supervision required for safety and for assistance with daily tasks. Assistance required with medications, and access to medication should be limited. Meals, nutrition and dietary restrictions need to be monitored.  All hazardous activities should be restricted or supervised. Should not drive. Prone to wandering and can become lost.  ADL:  Moderate functional decline. Familiar tasks usually requires set-up of supplies and directions to complete steps. May need objects handed to them for task initiation. Function best with a set schedule in familiar surroundings with familiar people. All complex tasks must be done by others. Vocabulary is diminished and speech often unfocused.

## 2019-09-20 NOTE — PROGRESS NOTES
ESTABLISHED PATIENT NEUROLOGY NOTE    DATE OF VISIT: 9/24/2019  CLINIC LOCATION: [facility]  MRN: [id number]  PATIENT NAME: Ciera Casas  YOB: 1950    PCP: Sowmya Tinajero PA-C    REASON FOR VISIT:   Chief Complaint   Patient presents with     Follow Up     MRI results. No concerns      SUBJECTIVE:                                                      HISTORY OF PRESENT ILLNESS: Patient is here for follow up regarding cognitive complaints. Please refer to my initial note from 09/06/2019 for further information.    Since the last visit, the patient reports evaluation in the emergency room on 09/08/2019 for episode of confusion and left arm paresthesias that lasted for 5 to 10 minutes along with intermittent left arm shaking.  Her blood pressure was elevated at 152/90, and pulse was 114.  The rest of the vital signs were stable.  EKG demonstrated sinus tachycardia.  Brain MRI demonstrated encephalomalacia and gliosis in the posterior superior left temporal lobe and lateral left parietal lobe, felt probably from old infarct.  MRA of head and neck was normal.  The case was discussed with general and stroke neurology, and the patient was discharged home with recommendation for possible outpatient EEG and cervical spine imaging.  The patient personally thinks that the episode was related to panic attack and does not want any additional testing at the present time.    Laboratory work-up, performed at the initial visit included normal hemoglobin A1C (5.3), methylmalonic acid, TSH (0.47), B1 and B12 levels.  CPT from 09/19/2019 was 4.33/5.5.  Neuropsychologic evaluation scheduled on 11/5/2019.    On review of systems, patient endorses no additional active complaints. Medications, allergies, family and social history were also reviewed. There are no changes reported by patient.  REVIEW OF SYSTEMS:                                                    10-system review was completed.  "Pertinent positives are included in HPI. The remainder of ROS is negative.  EXAM:                                                    Physical Exam:   Vitals: /70 (BP Location: Left arm, Patient Position: Sitting, Cuff Size: Adult Regular)   Pulse 86   Temp 97.5  F (36.4  C) (Oral)   Ht 1.626 m (5' 4\")   SpO2 97%   BMI 30.04 kg/m      General: pt is in NAD, cooperative.  Skin: normal turgor, moist mucous membranes, no lesions/rashes noticed.  HEENT: ATNC, white sclera, normal conjunctiva.  Respiratory: Symmetric lung excursion, no accessory respiratory muscle use.  Abdomen: Non distended.  Neurological: awake, cooperative, follows commands, no aphasia or dysarthria noted, cranial nerves II-XII: no ptosis, extraocular motility is full, face is symmetric, tongue is midline, equally moves all extremities, no dysmetria bilaterally, casual gait is normal.  DATA:   Labs/Imaging: I personally reviewed brain MRI, head and neck MRA images, recent emergency room notes, and other pertinent information, as detailed in history of present illness.  I also reviewed MRI/MRA images and reports with the patient.  ASSESSMENT AND PLAN:                                                    Assessment: 69-year-old female patient with cognitive dysfunction and prior history of stroke presents for follow-up after the completion of recommended brain MRI with and without contrast that demonstrated an area of encephalomalacia in the posterior superior left temporal lobe and lateral left parietal lobe, felt to represent old infarct.  No abnormal enhancement.    We had a detailed discussion with the patient regarding MRI findings and further work-up.  It appears that this represents silent stroke that the patient was unaware.  Her LDL is 78, and hemoglobin A1C is 5.3.  Her vessel imaging is unrevealing.  I would like to also order TTE and 14-day cardiac monitoring to evaluate for cardiac sources of her prior stroke.  She is already " appropriately on aspirin and atorvastatin.  We discussed additional secondary stroke prevention measures, as detailed below.    Regarding her cognitive impairment, laboratory work-up was unrevealing.  CPT was 4.33/5.6, rounded down to 4.0/5.6, consistent with moderate cognitive functional disability that would require 24 hour supervision with ability to be left alone for the part of the day (assisted living or living with family member/partner).  I discussed these findings with the patient.  I also advised her to come back with her partner for follow-up visit to help her remember what we will discuss.  Neuropsychologic evaluation is scheduled in November 2019.  Further management will be based on results.    Regarding her recent emergency room visit, we decided not to pursue any additional evaluation.  However, if the patient has more spells of left-sided paresthesias/shaking, 3-hour video EEG monitoring might be considered.  If she complains of a lot of cervical pain, we might consider doing cervical spine MRI in the future.    Diagnoses:    ICD-10-CM    1. History of stroke Z86.73 Zio Patch Holter Adult Pediatric Greater than 48 hrs     Echocardiogram Complete Bubble Study with Lumason   2. Cognitive complaints R41.9      Plan: At today's visit we thoroughly discussed MRI results, necessary and possible future evaluation, and the plan, which includes:  Orders Placed This Encounter   Procedures     Zio Patch Holter Adult Pediatric Greater than 48 hrs     Echocardiogram Complete Bubble Study with Lumason     For secondary stroke prevention, I counseled the patient to:  -Continue aspirin and Lipitor for now (we discussed that she might need anticoagulation if atrial fibrillation is found)  -Long-term blood pressure goal is less than 130/85  -Long-term LDL goal is less than 100  -Low-salt low-fat diet  -Regular aerobic exercise 30 minutes 3-4 times per week     Symptoms and signs of stroke were discussed.  The patient  clearly understands to call 911 with any SUDDEN onset of weakness, vision loss, speech problems, numbness, or severe headache of unknown cause.    Additional recommendations after the work-up.    Next follow-up appointment is in the next 2 months or earlier if needed.    Total Time: 42 minutes with > 50% spent counseling the patient on stated above assessment and recommendations.    Tony Moore MD  / Neurology

## 2019-09-24 ENCOUNTER — OFFICE VISIT (OUTPATIENT)
Dept: NEUROLOGY | Facility: CLINIC | Age: 69
End: 2019-09-24
Payer: COMMERCIAL

## 2019-09-24 VITALS
OXYGEN SATURATION: 97 % | DIASTOLIC BLOOD PRESSURE: 70 MMHG | SYSTOLIC BLOOD PRESSURE: 112 MMHG | TEMPERATURE: 97.5 F | HEART RATE: 86 BPM | HEIGHT: 64 IN | BODY MASS INDEX: 30.04 KG/M2

## 2019-09-24 DIAGNOSIS — Z86.73 HISTORY OF STROKE: Primary | ICD-10-CM

## 2019-09-24 DIAGNOSIS — R41.9 COGNITIVE COMPLAINTS: ICD-10-CM

## 2019-09-24 PROCEDURE — 99215 OFFICE O/P EST HI 40 MIN: CPT | Performed by: PSYCHIATRY & NEUROLOGY

## 2019-09-24 NOTE — LETTER
9/24/2019         RE: Ciera Casas  [address]  [facility] [address]        Dear Colleague,    Thank you for referring your patient, Ciera Casas, to the [facility]. Please see a copy of my visit note below.    ESTABLISHED PATIENT NEUROLOGY NOTE    DATE OF VISIT: 9/24/2019  CLINIC LOCATION: [facility]  MRN: [id number]  PATIENT NAME: Ciera Casas  YOB: 1950    PCP: Sowmya Tinajero PA-C    REASON FOR VISIT:   Chief Complaint   Patient presents with     Follow Up     MRI results. No concerns      SUBJECTIVE:                                                      HISTORY OF PRESENT ILLNESS: Patient is here for follow up regarding cognitive complaints. Please refer to my initial note from 09/06/2019 for further information.    Since the last visit, the patient reports evaluation in the emergency room on 09/08/2019 for episode of confusion and left arm paresthesias that lasted for 5 to 10 minutes along with intermittent left arm shaking.  Her blood pressure was elevated at 152/90, and pulse was 114.  The rest of the vital signs were stable.  EKG demonstrated sinus tachycardia.  Brain MRI demonstrated encephalomalacia and gliosis in the posterior superior left temporal lobe and lateral left parietal lobe, felt probably from old infarct.  MRA of head and neck was normal.  The case was discussed with general and stroke neurology, and the patient was discharged home with recommendation for possible outpatient EEG and cervical spine imaging.  The patient personally thinks that the episode was related to panic attack and does not want any additional testing at the present time.    Laboratory work-up, performed at the initial visit included normal hemoglobin A1C (5.3), methylmalonic acid, TSH (0.47), B1 and B12 levels.  CPT from 09/19/2019 was 4.33/5.5.  Neuropsychologic evaluation scheduled on 11/5/2019.    On review of systems, patient endorses no  "additional active complaints. Medications, allergies, family and social history were also reviewed. There are no changes reported by patient.  REVIEW OF SYSTEMS:                                                    10-system review was completed. Pertinent positives are included in HPI. The remainder of ROS is negative.  EXAM:                                                    Physical Exam:   Vitals: /70 (BP Location: Left arm, Patient Position: Sitting, Cuff Size: Adult Regular)   Pulse 86   Temp 97.5  F (36.4  C) (Oral)   Ht 1.626 m (5' 4\")   SpO2 97%   BMI 30.04 kg/m       General: pt is in NAD, cooperative.  Skin: normal turgor, moist mucous membranes, no lesions/rashes noticed.  HEENT: ATNC, white sclera, normal conjunctiva.  Respiratory: Symmetric lung excursion, no accessory respiratory muscle use.  Abdomen: Non distended.  Neurological: awake, cooperative, follows commands, no aphasia or dysarthria noted, cranial nerves II-XII: no ptosis, extraocular motility is full, face is symmetric, tongue is midline, equally moves all extremities, no dysmetria bilaterally, casual gait is normal.  DATA:   Labs/Imaging: I personally reviewed brain MRI, head and neck MRA images, recent emergency room notes, and other pertinent information, as detailed in history of present illness.  I also reviewed MRI/MRA images and reports with the patient.  ASSESSMENT AND PLAN:                                                    Assessment: 69-year-old female patient with cognitive dysfunction and prior history of stroke presents for follow-up after the completion of recommended brain MRI with and without contrast that demonstrated an area of encephalomalacia in the posterior superior left temporal lobe and lateral left parietal lobe, felt to represent old infarct.  No abnormal enhancement.    We had a detailed discussion with the patient regarding MRI findings and further work-up.  It appears that this represents silent stroke " that the patient was unaware.  Her LDL is 78, and hemoglobin A1C is 5.3.  Her vessel imaging is unrevealing.  I would like to also order TTE and 14-day cardiac monitoring to evaluate for cardiac sources of her prior stroke.  She is already appropriately on aspirin and atorvastatin.  We discussed additional secondary stroke prevention measures, as detailed below.    Regarding her cognitive impairment, laboratory work-up was unrevealing.  CPT was 4.33/5.6, rounded down to 4.0/5.6, consistent with moderate cognitive functional disability that would require 24 hour supervision with ability to be left alone for the part of the day (assisted living or living with family member/partner).  I discussed these findings with the patient.  I also advised her to come back with her partner for follow-up visit to help her remember what we will discuss.  Neuropsychologic evaluation is scheduled in November 2019.  Further management will be based on results.    Regarding her recent emergency room visit, we decided not to pursue any additional evaluation.  However, if the patient has more spells of left-sided paresthesias/shaking, 3-hour video EEG monitoring might be considered.  If she complains of a lot of cervical pain, we might consider doing cervical spine MRI in the future.    Diagnoses:    ICD-10-CM    1. History of stroke Z86.73 Zio Patch Holter Adult Pediatric Greater than 48 hrs     Echocardiogram Complete Bubble Study with Lumason   2. Cognitive complaints R41.9      Plan: At today's visit we thoroughly discussed MRI results, necessary and possible future evaluation, and the plan, which includes:  Orders Placed This Encounter   Procedures     Zio Patch Holter Adult Pediatric Greater than 48 hrs     Echocardiogram Complete Bubble Study with Lumason     For secondary stroke prevention, I counseled the patient to:  -Continue aspirin and Lipitor for now (we discussed that she might need anticoagulation if atrial fibrillation is  found)  -Long-term blood pressure goal is less than 130/85  -Long-term LDL goal is less than 100  -Low-salt low-fat diet  -Regular aerobic exercise 30 minutes 3-4 times per week     Symptoms and signs of stroke were discussed.  The patient clearly understands to call 911 with any SUDDEN onset of weakness, vision loss, speech problems, numbness, or severe headache of unknown cause.    Additional recommendations after the work-up.    Next follow-up appointment is in the next 2 months or earlier if needed.    Total Time: 42 minutes with > 50% spent counseling the patient on stated above assessment and recommendations.    Tony Moore MD  HP/ Neurology      Again, thank you for allowing me to participate in the care of your patient.        Sincerely,        Tony Moore MD

## 2019-09-24 NOTE — PATIENT INSTRUCTIONS
AFTER VISIT SUMMARY (AVS):    At today's visit we thoroughly discussed MRI results, necessary evaluation, and the plan, which includes:  Orders Placed This Encounter   Procedures     Zio Patch Holter Adult Pediatric Greater than 48 hrs     Echocardiogram Complete Bubble Study with Lumason     For secondary stroke prevention:  -Continue aspirin and Lipitor for now, you might other medication if atrial fibrillation is found  -Long-term blood pressure goal is less than 130/85  -Long-term LDL goal is less than 100  -Low-salt low-fat diet  -Regular aerobic exercise 30 minutes 3-4 times per week     Symptoms and signs of stroke were discussed.  You should call 911 with any SUDDEN onset of weakness, vision loss, speech problems, numbness, or severe headache of unknown cause.    Additional recommendations after the work-up.    Next follow-up appointment is in the next 2 months or earlier if needed.    Please do not hesitate to call me with any questions or concerns.    Thanks.    Patient Education     Symptoms of a Stroke    During a stroke, blood stops flowing to part of the brain. This can damage areas in the brain that control the rest of the body. Call 911 and get help right away if any of these symptoms come on suddenly, even if the symptoms don t last.  Know the symptoms of a stroke    Weakness. You may feel a sudden weakness, tingling, or a loss of feeling on one side of your face or body including your arm or leg.     Vision problems. You may have sudden double vision or trouble seeing in one or both eyes.    Speech problems. You may have sudden trouble talking, slurred speech, or problems understanding others.    Headache. You may have a sudden, severe headache.    Movement problems. You may have sudden trouble walking, dizziness, a feeling of spinning, a loss of balance, a feeling of falling, or blackouts.    Seizure. You may also have a seizure with a large or hemorrhagic stroke.   Remember: If you have any of  these symptoms, call 911 and your doctor as soon as possible.  F.A.S.T. is an easy way to remember the signs of a stroke. When you see these signs, you will know that you need to call 911 fast.   F.A.S.T. stands for:    F is for face drooping. One side of the face is drooping or numb. When the person smiles, the smile is uneven.    A is for arm weakness. One arm is weak or numb. When the person lifts both arms at the same time, one arm may drift downward.    S is for speech difficulty. You may notice slurred speech or difficulty speaking. The person can't repeat a simple sentence correctly when asked.    T is for time to dial 911. If someone shows any of these symptoms, even if they go away, call 911 right away. Make note of the time the symptoms first appeared.  Date Last Reviewed: 2/1/2018 2000-2018 [employer]. [address] [place] [address], [place] [address]. All rights reserved. This information is not intended as a substitute for professional medical care. Always follow your healthcare professional's instructions.

## 2019-09-25 ENCOUNTER — TELEPHONE (OUTPATIENT)
Dept: FAMILY MEDICINE | Facility: CLINIC | Age: 69
End: 2019-09-25
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
## 2019-09-26 ENCOUNTER — ANCILLARY PROCEDURE (OUTPATIENT)
Dept: CARDIOLOGY | Facility: CLINIC | Age: 69
End: 2019-09-26
Attending: PSYCHIATRY & NEUROLOGY
Payer: COMMERCIAL

## 2019-09-26 DIAGNOSIS — Z86.73 HISTORY OF STROKE: ICD-10-CM

## 2019-09-26 PROCEDURE — 0296T ZIO PATCH HOLTER ADULT PEDIATRIC GREATER THAN 48 HRS: CPT | Mod: ZF

## 2019-09-26 NOTE — PROGRESS NOTES
Per Dr. Moore, patient to have 14 day Ziopatch monitor placed.  Diagnosis: history of stroke   Monitor placed: Yes  Patient Instructed: Yes  Patient verbalized understanding: Yes  Holter # [id number]    Placed By Karmen Mike

## 2019-10-10 ENCOUNTER — ANCILLARY PROCEDURE (OUTPATIENT)
Dept: CARDIOLOGY | Facility: CLINIC | Age: 69
End: 2019-10-10
Attending: PSYCHIATRY & NEUROLOGY
Payer: COMMERCIAL

## 2019-10-10 DIAGNOSIS — Z86.73 HISTORY OF STROKE: ICD-10-CM

## 2019-10-10 RX ORDER — ACYCLOVIR 200 MG/1
10 CAPSULE ORAL ONCE
Status: COMPLETED | OUTPATIENT
Start: 2019-10-10 | End: 2019-10-10

## 2019-10-10 RX ADMIN — ACYCLOVIR 10 ML: 200 CAPSULE ORAL at 08:31

## 2019-10-11 ENCOUNTER — TELEPHONE (OUTPATIENT)
Dept: NEUROLOGY | Facility: CLINIC | Age: 69
End: 2019-10-11

## 2019-10-11 NOTE — TELEPHONE ENCOUNTER
Spoke to pt to inform her that the echocardiogram did not show any causes for her prior stroke and that moderate aortic stenosis was present which she should follow up with PC to further discuss.

## 2019-10-14 NOTE — PROGRESS NOTES
Subjective     Ciera Casas is a 69 year old female who presents to clinic today for the following health issues:    HPI     Patient is here to follow upon echo from 10/10/19.   Recent echo did not suggest reason for previous stroke (per neurology), but did suggest moderate aortic stenosis present.  Patient is on stroke prevention:   Continue aspirin and Lipitor for now  -Long-term blood pressure goal is less than 130/85  -Long-term LDL goal is less than 100  -Low-salt low-fat diet  -Regular aerobic exercise 30 minutes 3-4 times per week    Patient working with neurology and doing OT as well.  Has follow up scheduled with neurology in December as well.    Patient wondering about how anxiety plays a role in recent symptoms.  Patient has neuropsych evaluation already scheduled.    Patient Active Problem List   Diagnosis     CARDIOVASCULAR SCREENING; LDL GOAL LESS THAN 160     Overweight     Abnormal blood sugar     Closed fracture of middle or proximal phalanx or phalanges of hand     Stiffness of joint, not elsewhere classified, hand     Pain in joint, hand     Other postprocedural status(V45.89)     Depression with anxiety     Atherosclerotic plaque     Chronic right shoulder pain     Coronary artery disease involving native coronary artery of native heart without angina pectoris     Cervical cancer screening     Past Surgical History:   Procedure Laterality Date     APPENDECTOMY       BACK SURGERY       C NONSPECIFIC PROCEDURE  1992    laminectomy     CLOSED REDUCTION, PERCUTANEOUS PINNING FINGER, COMBINED  4/6/2012    Procedure:COMBINED CLOSED REDUCTION, PERCUTANEOUS PINNING FINGER; Right Ring Finger Proximal Fracture Closed Reduction Internal Fixation, percutaneous pinning  Choice anesthesia; Surgeon:MILDRED DICKSON; Location: OR       Social History     Tobacco Use     Smoking status: Former Smoker     Packs/day: 0.50     Years: 30.00     Pack years: 15.00     Types: Cigarettes     Last attempt to quit:  2/1/2009     Years since quitting: 10.7     Smokeless tobacco: Never Used   Substance Use Topics     Alcohol use: Yes     Comment: a couple of drinks on the weekend     Family History   Problem Relation Age of Onset     Cancer Mother         lung     Respiratory Mother         Emphysema     Cancer Father         esophagus     Osteoporosis Paternal Grandmother      Lipids Paternal Grandmother      Hypertension Maternal Aunt      Alzheimer Disease Other         fathers mother sister     Connective Tissue Disorder Other         lupus cousin     Depression Maternal Aunt      Respiratory Paternal Aunt         Asthma     Respiratory Other         neice     Respiratory Other         Asthma second cousin         Current Outpatient Medications   Medication Sig Dispense Refill     aspirin 81 MG tablet Take 81 mg by mouth daily       atorvastatin (LIPITOR) 40 MG tablet Take 1 tablet (40 mg) by mouth daily 90 tablet 3     busPIRone (BUSPAR) 10 MG tablet Take 1 tablet (10 mg) by mouth 3 times daily 90 tablet 3     Cholecalciferol (VITAMIN D3 PO) Take by mouth daily       fish oil-omega-3 fatty acids 1000 MG capsule Take 2 g by mouth daily       MAGNESIUM PO        Allergies   Allergen Reactions     Oxybutynin Difficulty breathing and Shortness Of Breath     Recent Labs   Lab Test 09/08/19  1201 09/06/19  1151 02/25/19  1437 04/05/18  1453 02/08/18  1149  12/13/16  0909   A1C  --  5.3  --   --   --   --   --    LDL  --   --  78  --  65  --  56   HDL  --   --  71  --  84  --  80   TRIG  --   --  77  --  72  --  53   ALT 31  --  34 39 76*  --   --    CR 0.84  --  0.96 0.98 0.85  --   --    GFRESTIMATED 71  --  61 56* 67  --   --    GFRESTBLACK 83  --  70 68 81  --   --    POTASSIUM 3.4  --  4.8 4.7 4.6  --   --    TSH  --  0.47  --  0.42 0.53   < >  --     < > = values in this interval not displayed.      BP Readings from Last 3 Encounters:   10/15/19 130/80   09/24/19 112/70   09/08/19 (!) 116/93    Wt Readings from Last 3  Encounters:   10/15/19 78.5 kg (173 lb)   09/08/19 79.4 kg (175 lb)   09/06/19 81.6 kg (180 lb)                    Reviewed and updated as needed this visit by Provider  Tobacco  Allergies  Meds  Problems  Med Hx  Surg Hx  Fam Hx         Review of Systems   ROS COMP: Constitutional, HEENT, cardiovascular, pulmonary, GI, , musculoskeletal, neuro, skin, endocrine and psych systems are negative, except as otherwise noted.      Objective    /80 (BP Location: Left arm, Patient Position: Sitting, Cuff Size: Adult Regular)   Pulse 97   Temp 96.5  F (35.8  C) (Tympanic)   Resp 17   Wt 78.5 kg (173 lb)   SpO2 97%   BMI 29.70 kg/m    Body mass index is 29.7 kg/m .  Physical Exam   GENERAL: healthy, alert and no distress  RESP: lungs clear to auscultation - no rales, rhonchi or wheezes  CV: regular rate and rhythm, normal S1 S2, no S3 or S4, no murmur, click or rub, no peripheral edema and peripheral pulses strong  NEURO: Normal strength and tone, mentation intact and speech normal  PSYCH: mentation appears normal, affect normal/bright    Diagnostic Test Results:  Labs reviewed in Epic        Assessment & Plan     (Z86.73) History of stroke  (primary encounter diagnosis)  Comment: Long standing, chronic, controlled  Plan: CARDIOLOGY EVAL ADULT REFERRAL        Patient followed by neuro and doing OT; getting neuropsych eval done in a few weeks as well; will follow up with cardiology to complete the work up just to be safe.    (R41.89) Cognitive changes  Comment: related to above vs anxiety vs alzheimer's concerns per neurology  Plan: CARDIOLOGY EVAL ADULT REFERRAL        See above    (F43.0) Acute reaction to stress  Comment: Long standing, chronic  Plan: busPIRone (BUSPAR) 10 MG tablet        Trial of buspar as needed to daily, see patient instructions.    (I25.10) Coronary artery disease involving native coronary artery of native heart without angina pectoris  Comment: Long standing, chronic,  "controlled  Plan: CARDIOLOGY EVAL ADULT REFERRAL        On stroke prevention and follow up with cardiology         ICD-10-CM    1. History of stroke Z86.73 CARDIOLOGY EVAL ADULT REFERRAL   2. Cognitive changes R41.89 CARDIOLOGY EVAL ADULT REFERRAL   3. Acute reaction to stress F43.0 busPIRone (BUSPAR) 10 MG tablet   4. Coronary artery disease involving native coronary artery of native heart without angina pectoris I25.10 CARDIOLOGY EVAL ADULT REFERRAL        BMI:   Estimated body mass index is 29.7 kg/m  as calculated from the following:    Height as of 9/24/19: 1.626 m (5' 4\").    Weight as of this encounter: 78.5 kg (173 lb).       Patient Instructions   Continue with OT and follow up with neurology as scheduled.    Trial of Buspar 10 mg nightly for the first 1-2 weeks (to check if makes you drowsy) then as needed if needed/tolerate for increased anxiety - max dose 30 mg two times a day.         Return in about 3 months (around 1/15/2020) for Routine visit, or sooner with worsening symptoms.    Sowmya Tinajero PA-C  [facility]    Answers for HPI/ROS submitted by the patient on 10/15/2019   If you checked off any problems, how difficult have these problems made it for you to do your work, take care of things at home, or get along with other people?: Somewhat difficult  PHQ9 TOTAL SCORE: 3  SAURABH 7 TOTAL SCORE: 9    "

## 2019-10-15 ENCOUNTER — OFFICE VISIT (OUTPATIENT)
Dept: FAMILY MEDICINE | Facility: CLINIC | Age: 69
End: 2019-10-15
Payer: COMMERCIAL

## 2019-10-15 VITALS
DIASTOLIC BLOOD PRESSURE: 80 MMHG | OXYGEN SATURATION: 97 % | TEMPERATURE: 96.5 F | WEIGHT: 173 LBS | RESPIRATION RATE: 17 BRPM | HEART RATE: 97 BPM | BODY MASS INDEX: 29.7 KG/M2 | SYSTOLIC BLOOD PRESSURE: 130 MMHG

## 2019-10-15 DIAGNOSIS — F43.0 ACUTE REACTION TO STRESS: ICD-10-CM

## 2019-10-15 DIAGNOSIS — I25.10 CORONARY ARTERY DISEASE INVOLVING NATIVE CORONARY ARTERY OF NATIVE HEART WITHOUT ANGINA PECTORIS: ICD-10-CM

## 2019-10-15 DIAGNOSIS — Z86.73 HISTORY OF STROKE: Primary | ICD-10-CM

## 2019-10-15 DIAGNOSIS — R41.89 COGNITIVE CHANGES: ICD-10-CM

## 2019-10-15 PROBLEM — F41.8 DEPRESSION WITH ANXIETY: Status: ACTIVE | Noted: 2019-01-30

## 2019-10-15 PROCEDURE — 99214 OFFICE O/P EST MOD 30 MIN: CPT | Performed by: PHYSICIAN ASSISTANT

## 2019-10-15 RX ORDER — BUSPIRONE HYDROCHLORIDE 10 MG/1
10 TABLET ORAL 3 TIMES DAILY
Qty: 90 TABLET | Refills: 3 | Status: SHIPPED | OUTPATIENT
Start: 2019-10-15 | End: 2020-08-05

## 2019-10-15 ASSESSMENT — ANXIETY QUESTIONNAIRES
GAD7 TOTAL SCORE: 9
3. WORRYING TOO MUCH ABOUT DIFFERENT THINGS: MORE THAN HALF THE DAYS
GAD7 TOTAL SCORE: 9
5. BEING SO RESTLESS THAT IT IS HARD TO SIT STILL: SEVERAL DAYS
6. BECOMING EASILY ANNOYED OR IRRITABLE: SEVERAL DAYS
GAD7 TOTAL SCORE: 9
2. NOT BEING ABLE TO STOP OR CONTROL WORRYING: NEARLY EVERY DAY
1. FEELING NERVOUS, ANXIOUS, OR ON EDGE: SEVERAL DAYS
4. TROUBLE RELAXING: SEVERAL DAYS
7. FEELING AFRAID AS IF SOMETHING AWFUL MIGHT HAPPEN: NOT AT ALL
7. FEELING AFRAID AS IF SOMETHING AWFUL MIGHT HAPPEN: NOT AT ALL

## 2019-10-15 ASSESSMENT — PATIENT HEALTH QUESTIONNAIRE - PHQ9
SUM OF ALL RESPONSES TO PHQ QUESTIONS 1-9: 3
SUM OF ALL RESPONSES TO PHQ QUESTIONS 1-9: 3
10. IF YOU CHECKED OFF ANY PROBLEMS, HOW DIFFICULT HAVE THESE PROBLEMS MADE IT FOR YOU TO DO YOUR WORK, TAKE CARE OF THINGS AT HOME, OR GET ALONG WITH OTHER PEOPLE: SOMEWHAT DIFFICULT

## 2019-10-15 NOTE — PATIENT INSTRUCTIONS
Continue with OT and follow up with neurology as scheduled.    Trial of Buspar 10 mg nightly for the first 1-2 weeks (to check if makes you drowsy) then as needed if needed/tolerate for increased anxiety - max dose 30 mg two times a day.

## 2019-10-16 ENCOUNTER — ALLIED HEALTH/NURSE VISIT (OUTPATIENT)
Dept: NURSING | Facility: CLINIC | Age: 69
End: 2019-10-16
Payer: COMMERCIAL

## 2019-10-16 DIAGNOSIS — Z23 NEED FOR PROPHYLACTIC VACCINATION AND INOCULATION AGAINST INFLUENZA: Primary | ICD-10-CM

## 2019-10-16 PROCEDURE — G0008 ADMIN INFLUENZA VIRUS VAC: HCPCS

## 2019-10-16 PROCEDURE — 90662 IIV NO PRSV INCREASED AG IM: CPT

## 2019-10-16 ASSESSMENT — ANXIETY QUESTIONNAIRES: GAD7 TOTAL SCORE: 9

## 2019-10-16 ASSESSMENT — PATIENT HEALTH QUESTIONNAIRE - PHQ9: SUM OF ALL RESPONSES TO PHQ QUESTIONS 1-9: 3

## 2019-10-17 ENCOUNTER — HOSPITAL ENCOUNTER (OUTPATIENT)
Dept: OCCUPATIONAL THERAPY | Facility: CLINIC | Age: 69
Setting detail: THERAPIES SERIES
End: 2019-10-17
Attending: PSYCHIATRY & NEUROLOGY
Payer: COMMERCIAL

## 2019-10-17 ENCOUNTER — TELEPHONE (OUTPATIENT)
Dept: NEUROLOGY | Facility: CLINIC | Age: 69
End: 2019-10-17

## 2019-10-17 PROCEDURE — 97535 SELF CARE MNGMENT TRAINING: CPT | Mod: GO | Performed by: OCCUPATIONAL THERAPIST

## 2019-10-22 NOTE — PROGRESS NOTES
I am delighted to see Ciera ARNETT Augusto in consultation for stroke, moderate aortic stenosis.    History of Present Illness:  As you know, the patient is a 69 year old  Female who saw neurology 9/6/19 for cognitive changes. A brain MRI done 1/2019 at [facility] for dizziness suggested prior stroke. Neuro testing did reveal mild cognitive impairment. An echo and ziopatch were ordered to assess for potential cardiac sources of stroke.     She denies chest pressure, dizziness, palpitations, syncope. She is now retired, takes care of her significant other who is on dialysis. Admits she doesn't do much. No stroke symptoms. She was in ED 9/8/19 with an anxiety attack. EKG at that time showed sinus tachycardia.      The following portions of the patient's history were reviewed and updated as appropriate: allergies, current medications, past family history, past medical history, past social history, past surgical history, and the problem list.    Past Medical History:  1. Nonobstructive CAD - calcium CT scan 2/12/19 - score 1507, left main and RCA, no specific stenosis; coronary angioCT: no obstructive CAD. On atorvastatin  2. Anxiety/depression  3. Appy    Medications:   Aspirin 81 every day  Atorvastatin 40 every day  Buspar  Vitamin D      Allergies:    Allergies   Allergen Reactions     Oxybutynin Difficulty breathing and Shortness Of Breath         Family History:   Family History   Problem Relation Age of Onset     Cancer Mother         lung     Respiratory Mother         Emphysema     Cancer Father         esophagus     Osteoporosis Paternal Grandmother      Lipids Paternal Grandmother      Hypertension Maternal Aunt      Alzheimer Disease Other         fathers mother sister     Connective Tissue Disorder Other         lupus cousin     Depression Maternal Aunt      Respiratory Paternal Aunt         Asthma     Respiratory Other         neice     Respiratory Other         Asthma second cousin       Psychosocial history:   reports that she quit smoking about 10 years ago. Her smoking use included cigarettes. She has a 15.00 pack-year smoking history. She has never used smokeless tobacco. She reports current alcohol use. She reports that she does not use drugs.    Review of systems:   Cardiovascular: No palpitations, chest pain, shortness of breath at rest, dyspnea with exertion, orthopnea, paroxysmal nocturia dyspnea, nocturia, dizziness, syncope.    In addition,   Constitutional: No change in weight, sleep or appetite.  Normal energy.  No fever or chills  Eyes: Negative for vision changes or eye problems  ENT: No problems with ears, nose or throat.  No difficulty swallowing.  Resp: No coughing, wheezing or shortness of breath  GI: No nausea, vomiting,  heartburn, abdominal pain, diarrhea, constipation or change in bowel habits  : No urinary frequency or dysuria, bladder or kidney problems  Musculoskeletal: No significant muscle or joint pains  Neurologic: No headaches, numbness, tingling, weakness, problems with balance or coordination  Psychiatric: No problems with anxiety, depression or mental health  Heme/immune/allergy: No history of bleeding or clotting problems or anemia.  No allergies or immune system problems  Integumentary: No rashes,worrisome lesions or skin problems      Physical examination:  Vitals: 120/72, 76 bpm  BMI= 30    Constitutional: In general, the patient is a pleasant female in no apparent distress.  Speech is clear although she wanders, has difficulty staying on task, does not remember details well  Eyes: PERRLA.  EOMI.  Sclerae white, not injected.  ENT/mouth: Normiocephalic and atraumatic.  Nares clear.  Pharynx without erythema or exudate.  Dentition intact.  No adenopathy.  No thyromegaly. Carotids +2/2 bilaterally without bruits.  No jugular venous distension.   Card/Vasc: The PMI is in the 5th ICS in the midclavicular line. There is no heave. Regular rate and rhythm. Normal S1, S2. Soft early peaking  systolic ejection murmur without radiation, no rub, click, or gallop. Pulses are normal bilaterally throughout. No peripheral edema.  Respiratory: Clear to asculation.  No ronchi, wheezes, rales.  No dullness to percussion.   GI: Abdomen is soft, nontender, nondistended. No organomegaly. No AAA.  No bruits.   Integument: No significant bruises or rashes  Neurological: The neurological examination reveal a patient who was oriented to person, place, and time.    Psych: Normal  Heme/Lymph/Immun: no significant adenopathy      I have reviewed the following labs/imaging:  Labs: 19 - cholesterol 154, HDL 71, LDL 78, TG 77, cr 0.84, hgb 14, plt 320K, TSH 0.47  Echo: 10/10/19 - EF 60-65%, aortic valve tricuspid, peak velocity 2.8m/sec consistent withmoderate aortic stenosis, bubble study negative, DOMINIK 30 ml/m2  Brain / neck MR angiogram 19: normal    I have reviewed records from [facility]. Relevant findings are:  Brain MRI 19 for dizziness: left temporoparietal encephalomalacia possibly due to remote infarct    I have personally and independently reviewed the following:  EK19- sinus tach 121 bpm  Ziopatch 4 days 19 : sinus 88 bpm, no AFib      Assessment :  1. Cognitive impairment, possible prior stroke on brain MRI 2019. No h/o atrial fibrillation. Ziopatch only 4 days all sinus. Aortic stenosis moderate, not source for prior stroke. Discussed with patient consideration for an implantable loop recorder to look for atrial fibrillation which can be related to cognitive impairment. If Afib found then chronic anticoagulation would be indicated. Discussed risks/benefits of procedure with patient. She is reluctant and will think about it  2. Moderate aortic stenosis, no symptoms, not source for neuro findgins.  3. Nonobstructive CAD. On aspirin and atorvastatin.      Plan:  Consider implantable loop recorder   Follow up echo 2 years for aortic stenosis    The patient is to return for echo in 2 years. If  she/PCP/Neuro would like to pursue ILR, please notify me and I can schedule at N . The patient understood the treatment plan as outlined above.  There were no barriers to learning.      Jocelyn Woodard MD

## 2019-10-24 ENCOUNTER — OFFICE VISIT (OUTPATIENT)
Dept: CARDIOLOGY | Facility: CLINIC | Age: 69
End: 2019-10-24
Payer: COMMERCIAL

## 2019-10-24 VITALS
OXYGEN SATURATION: 98 % | SYSTOLIC BLOOD PRESSURE: 120 MMHG | WEIGHT: 173.5 LBS | TEMPERATURE: 97.6 F | HEART RATE: 76 BPM | DIASTOLIC BLOOD PRESSURE: 72 MMHG | BODY MASS INDEX: 29.78 KG/M2

## 2019-10-24 DIAGNOSIS — I25.10 CORONARY ARTERY DISEASE INVOLVING NATIVE CORONARY ARTERY OF NATIVE HEART WITHOUT ANGINA PECTORIS: ICD-10-CM

## 2019-10-24 DIAGNOSIS — I35.0 NONRHEUMATIC AORTIC VALVE STENOSIS: Primary | ICD-10-CM

## 2019-10-24 PROCEDURE — 99205 OFFICE O/P NEW HI 60 MIN: CPT | Performed by: INTERNAL MEDICINE

## 2019-10-24 NOTE — PATIENT INSTRUCTIONS
You were seen today in the Cardiovascular Clinic at [facility].     Cardiology Providers you saw during your visit: Dr. oJcelyn Woodard    Diagnosis:   Moderate aortic stenosis, history of stroke    Results: discussed with patient      Orders:   none    Medication Changes:   none    Recommendations:   Consider implantable loop recorder    Follow-up:    As needed- For medication refills please contact your Primary Care Provider for future refills.     Please follow up with primary care provider for medication refills     Please feel free to call me with any questions or concerns.        Questions and [phone]      For Radiology / Imaging [phone]       On Call Cardiologist for after hours or on weekends: [phone]   option #4 and ask to speak to the on-call Cardiologist.          If you need a medication refill please contact your pharmacy.  Please allow 3 business days for your refill to be completed.

## 2019-10-24 NOTE — LETTER
10/24/2019      RE: Ciera Casas  [address]  [facility] [address]       Dear Colleague,    Thank you for the opportunity to participate in the care of your patient, Ciera Casas, at the [facility] at [facility]. Please see a copy of my visit note below.    I am delighted to see Ciera Casas in consultation for stroke, moderate aortic stenosis.    History of Present Illness:  As you know, the patient is a 69 year old  Female who saw neurology 9/6/19 for cognitive changes. A brain MRI done 1/2019 at [facility] for dizziness suggested prior stroke. Neuro testing did reveal mild cognitive impairment. An echo and ziopatch were ordered to assess for potential cardiac sources of stroke.     She denies chest pressure, dizziness, palpitations, syncope. She is now retired, takes care of her significant other who is on dialysis. Admits she doesn't do much. No stroke symptoms. She was in ED 9/8/19 with an anxiety attack. EKG at that time showed sinus tachycardia.      The following portions of the patient's history were reviewed and updated as appropriate: allergies, current medications, past family history, past medical history, past social history, past surgical history, and the problem list.    Past Medical History:  1. Nonobstructive CAD - calcium CT scan 2/12/19 - score 1507, left main and RCA, no specific stenosis; coronary angioCT: no obstructive CAD. On atorvastatin  2. Anxiety/depression  3. Appy    Medications:   Aspirin 81 every day  Atorvastatin 40 every day  Buspar  Vitamin D      Allergies:    Allergies   Allergen Reactions     Oxybutynin Difficulty breathing and Shortness Of Breath         Family History:   Family History   Problem Relation Age of Onset     Cancer Mother         lung     Respiratory Mother         Emphysema     Cancer Father         esophagus     Osteoporosis Paternal Grandmother      Lipids Paternal  Grandmother      Hypertension Maternal Aunt      Alzheimer Disease Other         fathers mother sister     Connective Tissue Disorder Other         lupus cousin     Depression Maternal Aunt      Respiratory Paternal Aunt         Asthma     Respiratory Other         neice     Respiratory Other         Asthma second cousin       Psychosocial history:  reports that she quit smoking about 10 years ago. Her smoking use included cigarettes. She has a 15.00 pack-year smoking history. She has never used smokeless tobacco. She reports current alcohol use. She reports that she does not use drugs.    Review of systems:   Cardiovascular: No palpitations, chest pain, shortness of breath at rest, dyspnea with exertion, orthopnea, paroxysmal nocturia dyspnea, nocturia, dizziness, syncope.    In addition,   Constitutional: No change in weight, sleep or appetite.  Normal energy.  No fever or chills  Eyes: Negative for vision changes or eye problems  ENT: No problems with ears, nose or throat.  No difficulty swallowing.  Resp: No coughing, wheezing or shortness of breath  GI: No nausea, vomiting,  heartburn, abdominal pain, diarrhea, constipation or change in bowel habits  : No urinary frequency or dysuria, bladder or kidney problems  Musculoskeletal: No significant muscle or joint pains  Neurologic: No headaches, numbness, tingling, weakness, problems with balance or coordination  Psychiatric: No problems with anxiety, depression or mental health  Heme/immune/allergy: No history of bleeding or clotting problems or anemia.  No allergies or immune system problems  Integumentary: No rashes,worrisome lesions or skin problems      Physical examination:  Vitals: 120/72, 76 bpm  BMI= 30    Constitutional: In general, the patient is a pleasant female in no apparent distress.  Speech is clear although she wanders, has difficulty staying on task, does not remember details well  Eyes: PERRLA.  EOMI.  Sclerae white, not injected.  ENT/mouth:  Normiocephalic and atraumatic.  Nares clear.  Pharynx without erythema or exudate.  Dentition intact.  No adenopathy.  No thyromegaly. Carotids +2/2 bilaterally without bruits.  No jugular venous distension.   Card/Vasc: The PMI is in the 5th ICS in the midclavicular line. There is no heave. Regular rate and rhythm. Normal S1, S2. Soft early peaking systolic ejection murmur without radiation, no rub, click, or gallop. Pulses are normal bilaterally throughout. No peripheral edema.  Respiratory: Clear to asculation.  No ronchi, wheezes, rales.  No dullness to percussion.   GI: Abdomen is soft, nontender, nondistended. No organomegaly. No AAA.  No bruits.   Integument: No significant bruises or rashes  Neurological: The neurological examination reveal a patient who was oriented to person, place, and time.    Psych: Normal  Heme/Lymph/Immun: no significant adenopathy      I have reviewed the following labs/imaging:  Labs: 19 - cholesterol 154, HDL 71, LDL 78, TG 77, cr 0.84, hgb 14, plt 320K, TSH 0.47  Echo: 10/10/19 - EF 60-65%, aortic valve tricuspid, peak velocity 2.8m/sec consistent withmoderate aortic stenosis, bubble study negative, DOMINIK 30 ml/m2  Brain / neck MR angiogram 19: normal    I have reviewed records from [facility]. Relevant findings are:  Brain MRI 19 for dizziness: left temporoparietal encephalomalacia possibly due to remote infarct    I have personally and independently reviewed the following:  EK19- sinus tach 121 bpm  Ziopatch 4 days 19 : sinus 88 bpm, no AFib      Assessment :  1. Cognitive impairment, possible prior stroke on brain MRI 2019. No h/o atrial fibrillation. Ziopatch only 4 days all sinus. Aortic stenosis moderate, not source for prior stroke. Discussed with patient consideration for an implantable loop recorder to look for atrial fibrillation which can be related to cognitive impairment. If Afib found then chronic anticoagulation would be indicated. Discussed  risks/benefits of procedure with patient. She is reluctant and will think about it  2. Moderate aortic stenosis, no symptoms, not source for neuro findgins.  3. Nonobstructive CAD. On aspirin and atorvastatin.      Plan:  Consider implantable loop recorder   Follow up echo 2 years for aortic stenosis    The patient is to return for echo in 2 years. If she/PCP/Neuro would like to pursue ILR, please notify me and I can schedule at [facility] . The patient understood the treatment plan as outlined above.  There were no barriers to learning.      Jocelyn Woodard MD

## 2019-10-31 ENCOUNTER — HOSPITAL ENCOUNTER (OUTPATIENT)
Dept: OCCUPATIONAL THERAPY | Facility: CLINIC | Age: 69
Setting detail: THERAPIES SERIES
End: 2019-10-31
Attending: PSYCHIATRY & NEUROLOGY
Payer: COMMERCIAL

## 2019-10-31 PROCEDURE — 97535 SELF CARE MNGMENT TRAINING: CPT | Mod: GO | Performed by: OCCUPATIONAL THERAPIST

## 2019-11-05 ENCOUNTER — OFFICE VISIT (OUTPATIENT)
Dept: NEUROPSYCHOLOGY | Facility: CLINIC | Age: 69
End: 2019-11-05
Attending: PSYCHIATRY & NEUROLOGY
Payer: COMMERCIAL

## 2019-11-05 DIAGNOSIS — G31.84 MCI (MILD COGNITIVE IMPAIRMENT): Primary | ICD-10-CM

## 2019-11-05 DIAGNOSIS — F41.9 ANXIOUSNESS: ICD-10-CM

## 2019-11-05 DIAGNOSIS — F33.1 MAJOR DEPRESSIVE DISORDER, RECURRENT EPISODE, MODERATE (H): ICD-10-CM

## 2019-11-05 NOTE — NURSING NOTE
The patient was seen for neuropsychological evaluation at the request of Tony Moore for the purposes of diagnostic clarification and treatment planning.  175 minutes of test administration and scoring were provided by this writer, Leonie Arboleda.  Please see Dr. Kevin Valdez's report for a full interpretation of the findings.

## 2019-11-07 ENCOUNTER — HOSPITAL ENCOUNTER (OUTPATIENT)
Dept: OCCUPATIONAL THERAPY | Facility: CLINIC | Age: 69
Setting detail: THERAPIES SERIES
End: 2019-11-07
Attending: PSYCHIATRY & NEUROLOGY
Payer: COMMERCIAL

## 2019-11-07 PROCEDURE — 97535 SELF CARE MNGMENT TRAINING: CPT | Mod: GO | Performed by: OCCUPATIONAL THERAPIST

## 2019-11-12 ENCOUNTER — OFFICE VISIT (OUTPATIENT)
Dept: FAMILY MEDICINE | Facility: CLINIC | Age: 69
End: 2019-11-12
Payer: COMMERCIAL

## 2019-11-12 VITALS
SYSTOLIC BLOOD PRESSURE: 102 MMHG | RESPIRATION RATE: 16 BRPM | OXYGEN SATURATION: 97 % | HEART RATE: 80 BPM | DIASTOLIC BLOOD PRESSURE: 62 MMHG | TEMPERATURE: 97.6 F

## 2019-11-12 DIAGNOSIS — M25.512 ACUTE PAIN OF LEFT SHOULDER: Primary | ICD-10-CM

## 2019-11-12 DIAGNOSIS — Z12.31 ENCOUNTER FOR SCREENING MAMMOGRAM FOR BREAST CANCER: ICD-10-CM

## 2019-11-12 PROCEDURE — 99213 OFFICE O/P EST LOW 20 MIN: CPT | Performed by: FAMILY MEDICINE

## 2019-11-12 NOTE — PROGRESS NOTES
Subjective     Ciera Casas is a 69 year old female who presents to clinic today for the following health issues:    HPI     Shoulder Pain    Onset: 2 week(s) ago    Description:Left shoulder   Location: deltoid and upper arm and can hear popping noise  Radiation: radiates to the elbow and radiates to the hand  Character: achy  Constant or intermittent: intermittent    History:   Does it wake you up at night: YES- but no longer   Any trauma or injury moving dehumidifier and holding cord while friends lifting weight and she felt sudden pain  Any previous Imaging/evaluations in the past: no    Accompanying Signs & Symptoms:   Any weakness in the shoulder: no  Numbness or tingling in the arm: YES- numbness   Does movement of the neck create shoulder pain: YES  Do rest or certain positions help decrease the shoulder pain: none  What movement causes the shoulder pain to occur: lifting up and extension and reaching across body   Do rest or certain positions decrease the pain: YES- laying it down in lap    Is pain related to exertion: no    Therapies tried  heat with moderate relief     left shoulder - 2 weeks.   Moving dehumidifier and not supporting weight and pulled back cord and suddenly felt shoulder popped.   Left handed.   Pain goes to wrist and hand but not fingers.   Can lift her hand but hard to do it quickly.  In the morning - some stiffness and pain. As day progress - feels better.   Moderate pain right now.   Does not think it is related to neck or back.   No chest pain or pressure.   Does not take pain medications. Does not like to take it.     Social History     Occupational History     Occupation: Rehab counselor     Employer: [employer]   Tobacco Use     Smoking status: Former Smoker     Packs/day: 0.50     Years: 30.00     Pack years: 15.00     Types: Cigarettes     Last attempt to quit: 2/1/2009     Years since quitting: 10.7     Smokeless tobacco: Never Used   Substance and Sexual Activity      Alcohol use: Yes     Comment: a couple of drinks on the weekend     Drug use: No     Sexual activity: Yes     Partners: Male     Allergies   Allergen Reactions     Oxybutynin Difficulty breathing and Shortness Of Breath     Patient Active Problem List   Diagnosis     CARDIOVASCULAR SCREENING; LDL GOAL LESS THAN 160     Overweight     Abnormal blood sugar     Closed fracture of middle or proximal phalanx or phalanges of hand     Stiffness of joint, not elsewhere classified, hand     Pain in joint, hand     Other postprocedural status(V45.89)     Depression with anxiety     Atherosclerotic plaque     Chronic right shoulder pain     Coronary artery disease involving native coronary artery of native heart without angina pectoris     Cervical cancer screening     Reviewed medications, social history and  past medical and surgical history.    Review of system: for general, respiratory, CVS, GI and psychiatry negative except for noted above.     EXAM:  /62 (BP Location: Right arm, Patient Position: Sitting, Cuff Size: Adult Large)   Pulse 80   Temp 97.6  F (36.4  C) (Oral)   Resp 16   SpO2 97%   Constitutional: healthy, alert and no distress   Psychiatric: mentation appears normal and affect normal/bright  Left shoulder - ac joint mild tenderness. , strength normal. Empty can sign negative. Scarf positive.   Neck rom not restricted.     ASSESSMENT / PLAN:  (M25.512) Acute pain of left shoulder  (primary encounter diagnosis)  Comment: with some radicular pain. I think it is appropriate to consider sports med due to radicular pain. Denies any neck symptoms. Does not want meds. Will hold off on therapy until seen by sports med. She agreed.   Plan: ORTHO  REFERRAL             (Z12.31) Encounter for screening mammogram for breast cancer  Comment:    Plan: MA SCREENING DIGITAL BILAT - Future  (s+30)       The above note was dictated using voice recognition. Although reviewed after completion, some word  and grammatical error may remain .

## 2019-11-14 ENCOUNTER — HOSPITAL ENCOUNTER (OUTPATIENT)
Dept: OCCUPATIONAL THERAPY | Facility: CLINIC | Age: 69
Setting detail: THERAPIES SERIES
End: 2019-11-14
Attending: PSYCHIATRY & NEUROLOGY
Payer: COMMERCIAL

## 2019-11-14 PROCEDURE — 97535 SELF CARE MNGMENT TRAINING: CPT | Mod: GO | Performed by: OCCUPATIONAL THERAPIST

## 2019-11-15 ENCOUNTER — OFFICE VISIT (OUTPATIENT)
Dept: ORTHOPEDICS | Facility: CLINIC | Age: 69
End: 2019-11-15
Payer: COMMERCIAL

## 2019-11-15 ENCOUNTER — ANCILLARY PROCEDURE (OUTPATIENT)
Dept: GENERAL RADIOLOGY | Facility: CLINIC | Age: 69
End: 2019-11-15
Attending: FAMILY MEDICINE
Payer: COMMERCIAL

## 2019-11-15 VITALS — WEIGHT: 173 LBS | RESPIRATION RATE: 16 BRPM | HEIGHT: 65 IN | BODY MASS INDEX: 28.82 KG/M2

## 2019-11-15 DIAGNOSIS — M25.512 ACUTE PAIN OF LEFT SHOULDER: ICD-10-CM

## 2019-11-15 DIAGNOSIS — M25.512 ACUTE PAIN OF LEFT SHOULDER: Primary | ICD-10-CM

## 2019-11-15 RX ORDER — DICLOFENAC SODIUM 75 MG/1
75 TABLET, DELAYED RELEASE ORAL 2 TIMES DAILY
Qty: 20 TABLET | Refills: 0 | Status: SHIPPED | OUTPATIENT
Start: 2019-11-15 | End: 2019-12-09

## 2019-11-15 ASSESSMENT — MIFFLIN-ST. JEOR: SCORE: 1310.6

## 2019-11-15 NOTE — LETTER
11/15/2019       RE: Ciera Casas  [address]  [facility] [address]     Dear Colleague,    Thank you for referring your patient, Ciera Casas, to the [facility] SPORTS AND ORTHOPAEDIC WALK IN CLINIC at [facility]. Please see a copy of my visit note below.    [facility]  Orthopedics  Daron Woodward, DO  11/15/2019     Name: Ciera Casas  MRN: [id number]  Age: 69 year old  : 1950  Referring provider: Willard Lee     Chief Complaint: Pain of the Left Shoulder    History of Present Illness:   Ciera Casas is a 69 year old female who presents today for evaluation of shoulder pain. The patient states that 3 weeks ago she was helping move a dehumidifier from the basement when she felt her left shoulder pop and subsequently developed left shoulder pain a couple days later. Since that time she states that the pain has only improved approximately 20% and her ROM has remained limited, particularly with internal rotation. The patient hasn't been taking anti-inflammatories as she didn't want to take medications.      What part of your body is injured / painful?  left shoulder    What caused the injury /pain? Movement     How long ago did your injury occur or pain begin? 3 weeks ago     What are your most bothersome symptoms? Pain and Clicking, popping    How would you characterize your symptom?  Intense, sharp,     What makes your symptoms better? Nothing    What makes your symptoms worse? Movement    Have you been previously seen for this problem? No    Review of Systems:   A 10-point review of systems was obtained and is negative except for as noted in the HPI.     Medications:     Current Outpatient Medications:      aspirin 81 MG tablet, Take 81 mg by mouth daily, Disp: , Rfl:      atorvastatin (LIPITOR) 40 MG tablet, Take 1 tablet (40 mg) by mouth daily, Disp: 90 tablet, Rfl: 3     busPIRone (BUSPAR) 10 MG tablet, Take 1 tablet (10 mg) by mouth 3 times  "daily, Disp: 90 tablet, Rfl: 3     Cholecalciferol (VITAMIN D3 PO), Take by mouth daily, Disp: , Rfl:      fish oil-omega-3 fatty acids 1000 MG capsule, Take 2 g by mouth daily, Disp: , Rfl:      MAGNESIUM PO, , Disp: , Rfl:     Allergies:  Oxybutynin     Past Medical History:  Abnormal blood sugar  CAD  Depression with anxiety  Hepatitis  Major depression    Past Surgical History:  Appendectomy  Back surgery  Closed reduction, percutaneous pinning finger     Social History:  She denies tobacco use and admits to social alcohol use.   Denies drug use.    Family History:  Lung cancer: Mother  Esophageal cancer: Father    Physical Examination:  Resp. rate 16, height 1.651 m (5' 5\"), weight 78.5 kg (173 lb), not currently breastfeeding.  General  - normal appearance, in no obvious distress  CV  - normal radial pulse  Pulm  - normal respiratory pattern, non-labored  Musculoskeletal - shoulder  - inspection: normal bone and joint alignment, no obvious deformity, no scapular winging, no AC step-off  - palpation: Tender at the anterior aspect at the greater tuberosity. no bony or soft tissue tenderness, normal clavicle, non-tender AC  - ROM:     45 deg extension   150 deg abduction   45 deg ER   IR limited to low lumbar region   140 elevation   - strength: 5/5  strength, 5/5 in all shoulder planes  - special tests:  (-) Speed  (-) Yergason test  Neuro  - no sensory or motor deficit, grossly normal coordination, normal muscle tone  Skin  - no ecchymosis, erythema, warmth, or induration, no obvious rash  Psych  - interactive, appropriate, normal mood and affect    Imaging:   Radiographs of the left shoulder - 3 views (11/15/2019)  Mild degenerative changes. Narrowing/DJD at the AC joint.     I have independently reviewed the above imaging studies; the results were discussed with the patient.     Assessment:   69 year old female with acute left shoulder pain.  History and physical exam today consistent with a rotator cuff " strain, possible partial tear.     Plan:   -Provided at-home exercises  -Prescribed Diclofenac, once in the morning and once at night after meals.   -Activity modifications provided, avoid provacative activities   -HEP provided today  -Follow up 4 weeks if persisting    Daron OROZCO DO, have reviewed the above note and agree with the scribe's notation as written.    Scribe Disclosure:  IRyan, am serving as a scribe to document services personally performed by Daron Woodward DO at this visit, based upon the provider's statements to me. All documentation has been reviewed by the aforementioned provider prior to being entered into the official medical record.          SPORTS & ORTHOPEDIC WALK-IN VISIT 11/15/2019    Primary Care Physician: Dr. Tinajero     Helping move a dehumidifier from the basement, and was pulling on the cord and felt a pop.  Limited ROM. But has tried to keep shoulder moving in the past couple of weeks,    Reason for visit:     What part of your body is injured / painful?  left shoulder    What caused the injury /pain? Movement     How long ago did your injury occur or pain begin? 3 weeks ago     What are your most bothersome symptoms? Pain and Clicking, popping    How would you characterize your symptom?  Intense, sharp,     What makes your symptoms better? Nothing    What makes your symptoms worse? Movement    Have you been previously seen for this problem? No    Medical History:    Any recent changes to your medical history? No    Any new medication prescribed since last visit? No    Have you had surgery on this body part before? No    Social History:    Occupation: Retired     Handedness: Left    Exercise:     Review of Systems:    Do you have fever, chills, weight loss? No    Do you have any vision problems? No    Do you have any chest pain or edema? No    Do you have any shortness of breath or wheezing?  No    Do you have stomach problems? No    Do you have any numbness or focal  weakness? No    Do you have diabetes? No    Do you have problems with bleeding or clotting? No    Do you have an rashes or other skin lesions? No

## 2019-11-15 NOTE — PATIENT INSTRUCTIONS
Rotator Cuff Injury     WHAT IS A ROTATOR CUFF INJURY?    A rotator cuff injury is irritation of or damage to the group of tendons and muscles that hold your shoulder joint together. Tendons are strong bands of tissue that attach muscle to bone. You use the muscles and tendons in your shoulder joint to move your shoulder and raise your arm over your head.        WHAT IS THE CAUSE?    A rotator cuff injury can be caused by:    Overuse of your shoulder in a sport or work activity that involves repetitive overhead movement of your shoulder, like swimming, baseball (mainly pitching), football, tennis, painting, plastering, or housework.  A sudden activity that twists your shoulder or tears your tendon, such as using your arm to break a fall, falling onto your arm, or lifting a heavy object  You may be at higher risk for a rotator cuff injury if you have poor head and shoulder posture, especially if you are older.    WHAT ARE THE SYMPTOMS?    Symptoms may include:    Pain and weakness in your arm and shoulder  Loss of shoulder movement, especially when you try to raise your arm overhead    HOW IS IT DIAGNOSED?    Your healthcare provider will ask about your symptoms, activities, and medical history and examine you. You may have X-rays or other scans or procedures, such as:    An ultrasound, which uses sound waves to show pictures of your shoulder joint  An MRI, which uses a strong magnetic field and radio waves to show detailed pictures of your shoulder joint  An arthrogram, which is an X-ray or MRI taken after a dye is injected into your joint to outline its shape  Arthroscopy, which is a type of surgery done with a small scope inserted into your joint so your provider can look directly at your joint    HOW IS IT TREATED?    You will need to change or stop doing the activities that cause pain until your injury has healed. For example, avoid strenuous activity or any overhead motion that causes pain. Also, try to make  sure that you are practicing good posture and are not slouching forward.    Your healthcare provider may recommend stretching and strengthening exercises to help you heal.    If you have a bad tear, you may need to have it repaired with surgery. After surgery, your treatment plan will include physical therapy to strengthen your shoulder as it heals.    The pain often gets better within a few weeks with self-care, but some injuries may take several months or longer to heal. It s important to follow all of your healthcare provider s instructions.    HOW CAN I TAKE CARE OF MYSELF?    To keep swelling down and help relieve pain:    Put an ice pack, gel pack, or package of frozen vegetables wrapped in a cloth on the area every 3 to 4 hours for up to 20 minutes at a time.  Take nonprescription pain medicine, such as acetaminophen, ibuprofen, or naproxen. Nonsteroidal anti-inflammatory medicines (NSAIDs), such as ibuprofen and naproxen, may cause stomach bleeding and other problems. These risks increase with age. Read the label and take as directed. Unless recommended by your healthcare provider, you should not take this medicine for more than 10 days.  Moist heat may help relieve pain, relax your muscles, and make it easier to move your arm and shoulder. Put moist heat on the injured area for 10 to 15 minutes before you do warm-up and stretching exercises. Moist heat includes heat patches or moist heating pads that you can buy at most drugstores, a warm wet washcloth, or a hot shower. To prevent burns to your skin, follow directions on the package and do not lie on any type of hot pad. Don t use heat if you have swelling.    Follow your healthcare provider's instructions, including any exercises recommended by your provider. Ask your provider:    How and when you will hear your test results  How long it will take to recover  What activities you should avoid, including how much you can lift, and when you can return to your  normal activities  How to take care of yourself at home  What symptoms or problems you should watch for and what to do if you have them  Make sure you know when you should come back for a checkup.    HOW CAN I HELP PREVENT A ROTATOR CUFF INJURY?    Warm-up exercises and stretching before activities can help prevent injuries. For example, do exercises that strengthen your shoulder muscles. If your arm or shoulder hurts after exercise, putting ice on it may help keep it from getting injured.    Follow safety rules and use any protective equipment recommended for your work or sport.    Avoid activities that cause pain. For example, avoid lifting heavy objects over your head.    Developed by [employer].  Published by [employer].  Copyright  2014 [employer] and/or one of its subsidiaries. All rights reserved.    Rotator Cuff Exercises    Isometric shoulder external rotation:  a doorway with your elbow bent 90 degrees and the back of the wrist on your injured side pressed against the door frame. Try to press your hand outward into the door frame. Hold for 5 seconds. Do 2 sets of 15.    Isometric shoulder internal rotation:  a doorway with your elbow bent 90 degrees and the front of the wrist on your injured side pressed against the door frame. Try to press your palm into the door frame. Hold for 5 seconds. Do 2 sets of 15.  Wand exercise, flexion: Stand upright and hold a stick in both hands, palms down. Stretch your arms by lifting them over your head, keeping your arms straight. Hold for 5 seconds and return to the starting position. Repeat 10 times.    Wand exercise, extension: Stand upright and hold a stick in both hands behind your back. Move the stick away from your back. Hold this position for 5 seconds. Relax and return to the starting position. Repeat 10 times.  Wand exercise, external rotation: Lie on your back and hold a stick in both hands, palms up. Your upper arms should be  resting on the floor with your elbows at your sides and bent 90 degrees. Use your uninjured arm to push your injured arm out away from your body. Keep the elbow of your injured arm at your side while it is being pushed. Hold the stretch for 5 seconds. Repeat 10 times.    Wand exercise, shoulder abduction and adduction: Stand and hold a stick with both hands, palms facing away from your body. Rest the stick against the front of your thighs. Use your uninjured arm to push your injured arm out to the side and up as high as possible. Keep your arms straight. Hold for 5 seconds. Repeat 10 times.    Resisted shoulder external rotation: Stand sideways next to a door with your injured arm farther from the door. Tie a knot in the end of the tubing and shut the knot in the door at waist level (or use cable weight machine at gym). Hold the other end of the tubing with the hand of your injured arm. Rest the hand of your injured arm across your stomach. Keeping your elbow in at your side, rotate your arm outward and away from your waist. Slowly return your arm to the starting position. Make sure you keep your elbow bent 90 degrees and your forearm parallel to the floor. Repeat 10 times. Build up to 2 sets of 15.    Resisted shoulder internal rotation: Stand sideways next to a door with your injured arm closest to the door. Tie a knot in the end of the tubing and shut the knot in the door at waist level (or use cable weight machine at gym). Hold the other end of the tubing with the hand of your injured arm. Bend the elbow of your injured arm 90 degrees. Keeping your elbow in at your side, rotate your forearm across your body and then slowly back to the starting position. Make sure you keep your forearm parallel to the floor. Do 2 sets of 8 to 12.    Scaption: Stand with your arms at your sides and with your elbows straight. Slowly raise your arms to eye level. As you raise your arms, spread them apart so that they are only  "slightly in front of your body (at about a 30-degree angle to the front of your body). Point your thumbs toward the ceiling. Hold for 2 seconds and lower your arms slowly. Do 2 sets of 15. Progress to holding a soup can or light weight when you are doing the exercise and increase the weight as the exercise gets easier.    Side-lying external rotation: Lie on your uninjured side with your injured arm at your side and your elbow bent 90 degrees. Keeping your elbow against your side, raise your forearm toward the ceiling and hold for 2 seconds. Slowly lower your arm. Do 2 sets of 15. You can start doing this exercise holding a soup can or light weight and gradually increase the weight as long as there is no pain.    Horizontal abduction: Lie on your stomach on a table or the edge of a bed with the arm on your injured side hanging down over the edge. Raise your arm out to the side, with your thumb pointed toward the ceiling, until your arm is parallel to the floor. Hold for 2 seconds and then lower it slowly. Start this exercise with no weight. As you get stronger, add a light weight or hold a soup can. Do 2 sets of 15.    Push-up with a plus: Begin on the floor on your hands and knees. Keep your hands a shoulder width apart and lift your feet off the floor. Arch your back as high as possible and round your shoulders (this is the \"plus\" part or the exercise). Bend your elbows and lower your body to the floor. Return to the starting position and arch your back again. Do 2 sets of 15.          "

## 2019-11-15 NOTE — PROGRESS NOTES
[facility]  Orthopedics  Daron Woodward, DO  11/15/2019     Name: Ciera Casas  MRN: [id number]  Age: 69 year old  : 1950  Referring provider: Willard Lee     Chief Complaint: Pain of the Left Shoulder    History of Present Illness:   Ciera Casas is a 69 year old female who presents today for evaluation of shoulder pain. The patient states that 3 weeks ago she was helping move a dehumidifier from the basement when she felt her left shoulder pop and subsequently developed left shoulder pain a couple days later. Since that time she states that the pain has only improved approximately 20% and her ROM has remained limited, particularly with internal rotation. The patient hasn't been taking anti-inflammatories as she didn't want to take medications.      What part of your body is injured / painful?  left shoulder    What caused the injury /pain? Movement     How long ago did your injury occur or pain begin? 3 weeks ago     What are your most bothersome symptoms? Pain and Clicking, popping    How would you characterize your symptom?  Intense, sharp,     What makes your symptoms better? Nothing    What makes your symptoms worse? Movement    Have you been previously seen for this problem? No    Review of Systems:   A 10-point review of systems was obtained and is negative except for as noted in the HPI.     Medications:     Current Outpatient Medications:      aspirin 81 MG tablet, Take 81 mg by mouth daily, Disp: , Rfl:      atorvastatin (LIPITOR) 40 MG tablet, Take 1 tablet (40 mg) by mouth daily, Disp: 90 tablet, Rfl: 3     busPIRone (BUSPAR) 10 MG tablet, Take 1 tablet (10 mg) by mouth 3 times daily, Disp: 90 tablet, Rfl: 3     Cholecalciferol (VITAMIN D3 PO), Take by mouth daily, Disp: , Rfl:      fish oil-omega-3 fatty acids 1000 MG capsule, Take 2 g by mouth daily, Disp: , Rfl:      MAGNESIUM PO, , Disp: , Rfl:     Allergies:  Oxybutynin     Past Medical History:  Abnormal blood  "sugar  CAD  Depression with anxiety  Hepatitis  Major depression    Past Surgical History:  Appendectomy  Back surgery  Closed reduction, percutaneous pinning finger     Social History:  She denies tobacco use and admits to social alcohol use.   Denies drug use.    Family History:  Lung cancer: Mother  Esophageal cancer: Father    Physical Examination:  Resp. rate 16, height 1.651 m (5' 5\"), weight 78.5 kg (173 lb), not currently breastfeeding.  General  - normal appearance, in no obvious distress  CV  - normal radial pulse  Pulm  - normal respiratory pattern, non-labored  Musculoskeletal - shoulder  - inspection: normal bone and joint alignment, no obvious deformity, no scapular winging, no AC step-off  - palpation: Tender at the anterior aspect at the greater tuberosity. no bony or soft tissue tenderness, normal clavicle, non-tender AC  - ROM:     45 deg extension   150 deg abduction   45 deg ER   IR limited to low lumbar region   140 elevation   - strength: 5/5  strength, 5/5 in all shoulder planes  - special tests:  (-) Speed  (-) Yergason test  Neuro  - no sensory or motor deficit, grossly normal coordination, normal muscle tone  Skin  - no ecchymosis, erythema, warmth, or induration, no obvious rash  Psych  - interactive, appropriate, normal mood and affect    Imaging:   Radiographs of the left shoulder - 3 views (11/15/2019)  Mild degenerative changes. Narrowing/DJD at the AC joint.     I have independently reviewed the above imaging studies; the results were discussed with the patient.     Assessment:   69 year old female with acute left shoulder pain.  History and physical exam today consistent with a rotator cuff strain, possible partial tear.     Plan:   -Provided at-home exercises  -Prescribed Diclofenac, once in the morning and once at night after meals.   -Activity modifications provided, avoid provacative activities   -HEP provided today  -Follow up 4 weeks if persisting    Daron OROZCO, DO, " have reviewed the above note and agree with the scribe's notation as written.    Scribe Disclosure:  I, Ryan Arce, am serving as a scribe to document services personally performed by Daron Woodward DO at this visit, based upon the provider's statements to me. All documentation has been reviewed by the aforementioned provider prior to being entered into the official medical record.

## 2019-11-15 NOTE — PROGRESS NOTES
SPORTS & ORTHOPEDIC WALK-IN VISIT 11/15/2019    Primary Care Physician: Dr. Tinjaero     Helping move a dehumidifier from the basement, and was pulling on the cord and felt a pop.  Limited ROM. But has tried to keep shoulder moving in the past couple of weeks,    Reason for visit:     What part of your body is injured / painful?  left shoulder    What caused the injury /pain? Movement     How long ago did your injury occur or pain begin? 3 weeks ago     What are your most bothersome symptoms? Pain and Clicking, popping    How would you characterize your symptom?  Intense, sharp,     What makes your symptoms better? Nothing    What makes your symptoms worse? Movement    Have you been previously seen for this problem? No    Medical History:    Any recent changes to your medical history? No    Any new medication prescribed since last visit? No    Have you had surgery on this body part before? No    Social History:    Occupation: Retired     Handedness: Left    Exercise:     Review of Systems:    Do you have fever, chills, weight loss? No    Do you have any vision problems? No    Do you have any chest pain or edema? No    Do you have any shortness of breath or wheezing?  No    Do you have stomach problems? No    Do you have any numbness or focal weakness? No    Do you have diabetes? No    Do you have problems with bleeding or clotting? No    Do you have an rashes or other skin lesions? No

## 2019-11-16 NOTE — TELEPHONE ENCOUNTER
DIAGNOSIS: L Shoulder Pain, lasting 2 weeks   APPOINTMENT DATE: 11.21.19   NOTES STATUS DETAILS   OFFICE NOTE from referring provider N/A    OFFICE NOTE from other specialist Internal 11.15.19 Dr. Woodward  11.12.19 Jesus   DISCHARGE SUMMARY from hospital N/A    DISCHARGE REPORT from the ER Internal 9.8.19   OPERATIVE REPORT N/A    MEDICATION LIST Internal    IMPLANT RECORD/STICKER N/A    LABS     CBC/DIFF Care Everywhere 1.28.19   CULTURES N/A    INJECTIONS DONE IN RADIOLOGY N/A    MRI N/A    CT SCAN N/A    XRAYS (IMAGES & REPORTS) Internal 11.15.19 Shoulder   TUMOR     PATHOLOGY  Slides & report N/A

## 2019-11-19 NOTE — PROGRESS NOTES
Outpatient Occupational Therapy Discharge Note     Patient: Ciera Casas  : 1950    Beginning/End Dates of Reporting Period:  19 to 2019    Referring Provider: Tony Moore MD    Therapy Diagnosis: Cognitive complaints    Client Self Report: Saw MD for L shoulder pain yesterday - they referred her to Sports Medicine.  Thinks she hurt it a while ago when she helped family carry up a dehumidifier from downstairs.  Thought the pain would go away but it has not.  Patient meeting with referring doctor in December to discuss results of all testing and therapy.  Hosting for Thanksgiving for at least 10-12 adults plus kids.  Reports she has been known to forget to turn off the stove/oven, but is more mindful of it now.  Hasn't been writing in her memory book much.     Objective Measurements:  On 10/31/19: [employer]   Mode A 47 BNL, Mode B 42 WNL where WNL is 42 and above.  Mode B with div atten 20 5/10 BNL where WNL is 35 and 9/10 or greater.        Goals:     Goal Identifier CPT education   Goal Description Patient and family to verbalize understanding of Cognitive Performance Test results and identify 3 strategies to increase patient's safety and independence in the home setting.   Target Date 11/15/19   Date Met   19   Progress:  Goal met.     Goal Identifier Memory compensation strategies   Goal Description Patient to utilize memory tools (planner, calendar, etc.) and verbalize and demonstrate  independence with problem solving and memory compensation techniques for increased safety and independence with ADLs/IADLs and ability to manage time, appointments, daily schedule, etc   Target Date 11/15/19   Date Met      Progress: Goal not fully met.  Patient educated in numerous memory compensation strategies and was issued memory logbook, however, patient not consistent with utilizing strategies at time of discharge.     Goal Identifier Safety with kitchen task   Goal Description Patient  will demonstrate the ability to cook a moderately complex meal prep task using the stove and/or oven with modified independence for increased safety and independence at home.   Target Date 11/15/19   Date Met      Progress: Goal not addressed due to limited number of treatment sessions and patient choosing to be done with occupational therapy.  Reports cooking is going fine at home.  Educated patient in strategies to ensure optimal safety.     Progress Toward Goals:   Progress this reporting period: Patient seen for a total of 5 treatment sessions to address above goal areas.  The Cognitive Performance Test  (CPT) was completed on 19 - please see full note for details of results and recommendations (patient scored 4.3).  Patient was educated in numerous strategies for memory compensation to help increase her safety and independence at home.    Recommendations:      Regarding community mobility/driving, do not feel patient demonstrates the cognitive nor physical skills (reaction time) to safely operate a motor vehicle.  Strongly recommend a formal behind the wheel driving assessment if patient continues to push to drive and MD is in agreement with plan for formal assessment.    Supervision in living settin hour ideally, or live with someone with ability to be alone parts of the day (ie assisted living), or living with family member.    Plan:  Discharge from therapy.    Discharge:    Reason for Discharge: Patient chooses to discontinue therapy.  Ideally, would recommend a few additional sessions to address goal areas, however, patient needing to get rides from [place] and chooses to be done with occupational therapy.    Equipment Issued: memory/log book    Discharge Plan: Patient to continue home program.

## 2019-11-21 ENCOUNTER — OFFICE VISIT (OUTPATIENT)
Dept: ORTHOPEDICS | Facility: CLINIC | Age: 69
End: 2019-11-21
Payer: COMMERCIAL

## 2019-11-21 ENCOUNTER — PRE VISIT (OUTPATIENT)
Dept: ORTHOPEDICS | Facility: CLINIC | Age: 69
End: 2019-11-21

## 2019-11-21 VITALS — WEIGHT: 173.06 LBS | BODY MASS INDEX: 28.83 KG/M2 | HEIGHT: 65 IN

## 2019-11-21 DIAGNOSIS — S46.002A ROTATOR CUFF INJURY, LEFT, INITIAL ENCOUNTER: ICD-10-CM

## 2019-11-21 DIAGNOSIS — M25.512 LEFT SHOULDER PAIN, UNSPECIFIED CHRONICITY: Primary | ICD-10-CM

## 2019-11-21 DIAGNOSIS — M25.512 ACUTE PAIN OF LEFT SHOULDER: ICD-10-CM

## 2019-11-21 DIAGNOSIS — S49.92XA INJURY OF LEFT SHOULDER, INITIAL ENCOUNTER: ICD-10-CM

## 2019-11-21 ASSESSMENT — PAIN SCALES - GENERAL: PAINLEVEL: MILD PAIN (3)

## 2019-11-21 ASSESSMENT — MIFFLIN-ST. JEOR: SCORE: 1310.88

## 2019-11-21 NOTE — PROGRESS NOTES
" Subjective:   Ciera Casas is a 69 year old female who presents with left shoulder pain. Acute onset pain about 3 weeks ago while assisting in lifting and moving a dehumidifier.  She now reports popping of the shoulder. The patient states that pain radiates occasionally down to left wrist.  No prior hx of injury to left shoulder.  Sx seem to be slowly worsening associated with decreased ROM.  She is left-hand dominant.  Had problem of right shoulder in past.  This resolved with PT.    Background:   Date of injury: 3 weeks ago- felt something wrong in shoulder when moving dehumidifier  Duration of symptoms: 3 weeks  Mechanism of Injury: see above  Intensity: 0/10 at rest, 4/10 with activity, especially overhead activity.   Aggravating factors: Overhead movements, at night  Relieving Factors: Nothing. Tried voltaren tablets and made her sick.  Prior Evaluation: Dr. Woodward, xrays    PAST MEDICAL, SOCIAL, SURGICAL AND FAMILY HISTORY: reviewed  ALLERGIES: She is allergic to oxybutynin.    CURRENT MEDICATIONS: She has a current medication list which includes the following prescription(s): aspirin, atorvastatin, buspirone, cholecalciferol, diclofenac, fish oil-omega-3 fatty acids, and magnesium.        Exam:   Ht 1.651 m (5' 5\")   Wt 78.5 kg (173 lb 1 oz)   BMI 28.80 kg/m          CONSTITUTIONAL: healthy, alert and no distress  GAIT: normal  PSYCHIATRIC: affect normal/bright and mentation appears normal.    MUSCULOSKELETAL:   RIGHT SHOULDER: No deformity.  No tenderness at AC joint or clavicle.  AROM: forward flexion 0-180 degrees, no pain; abduction 0-180 degrees, no pain; IR/EXT/ADD: L1, no pain.  (-) crossed arm adduction.  (-) Speed's test.  Resisted strength testing: forward flexion 5/5, no pain' abduction 5/5, no pain; IR 5/5,  No pain; ER 5/5, no pain.  LEFT SHOULDER:  No deformity.  No tenderness at AC joint or clavicle.  AROM: forward flexion 0-160 degrees, pain  degrees; abduction 0-180 degrees, pain "  degrees withincreased pain while returning to neutral position; IR/EXT/ADD: L5, pain.  (-) crossed arm adduction.  (-) Speed's test.  Resisted strength testing: forward flexion 2/5, pain' abduction 2/5, pain; IR 5/5, no pain; ER 2/5, pain.  [employer] test more painful in supination than pronation.  No obvious scapular dysfunction    Reviewed x-rays obtained recently (-).         Assessment/Plan:   ASSESSMENT: Left shoulder injury with rotator cuff tendinopathy, possible acute rotator cuff tear given increasing pain and disability.    PLAN: Continue ROM.  Obtain MRI of left shoulder.  Depending on structural change, consider PT or surgical consultation.  Phone f/U

## 2019-11-21 NOTE — LETTER
"11/21/2019    RE: Ciera Casas  [address]  [facility] [address]        Subjective:   Ciera Casas is a 69 year old female who presents with left shoulder pain. Acute onset pain about 3 weeks ago while assisting in lifting and moving a dehumidifier.  She now reports popping of the shoulder. The patient states that pain radiates occasionally down to left wrist.  No prior hx of injury to left shoulder.  Sx seem to be slowly worsening associated with decreased ROM.  She is left-hand dominant.  Had problem of right shoulder in past.  This resolved with PT.    Background:   Date of injury: 3 weeks ago- felt something wrong in shoulder when moving dehumidifier  Duration of symptoms: 3 weeks  Mechanism of Injury: see above  Intensity: 0/10 at rest, 4/10 with activity, especially overhead activity.   Aggravating factors: Overhead movements, at night  Relieving Factors: Nothing. Tried voltaren tablets and made her sick.  Prior Evaluation: Dr. Woodward, xrays    PAST MEDICAL, SOCIAL, SURGICAL AND FAMILY HISTORY: reviewed  ALLERGIES: She is allergic to oxybutynin.    CURRENT MEDICATIONS: She has a current medication list which includes the following prescription(s): aspirin, atorvastatin, buspirone, cholecalciferol, diclofenac, fish oil-omega-3 fatty acids, and magnesium.        Exam:   Ht 1.651 m (5' 5\")   Wt 78.5 kg (173 lb 1 oz)   BMI 28.80 kg/m           CONSTITUTIONAL: healthy, alert and no distress  GAIT: normal  PSYCHIATRIC: affect normal/bright and mentation appears normal.    MUSCULOSKELETAL:   RIGHT SHOULDER: No deformity.  No tenderness at AC joint or clavicle.  AROM: forward flexion 0-180 degrees, no pain; abduction 0-180 degrees, no pain; IR/EXT/ADD: L1, no pain.  (-) crossed arm adduction.  (-) Speed's test.  Resisted strength testing: forward flexion 5/5, no pain' abduction 5/5, no pain; IR 5/5,  No pain; ER 5/5, no pain.  LEFT SHOULDER:  No deformity.  No tenderness at AC joint or clavicle.  AROM: " forward flexion 0-160 degrees, pain  degrees; abduction 0-180 degrees, pain  degrees withincreased pain while returning to neutral position; IR/EXT/ADD: L5, pain.  (-) crossed arm adduction.  (-) Speed's test.  Resisted strength testing: forward flexion 2/5, pain' abduction 2/5, pain; IR 5/5, no pain; ER 2/5, pain.  Wilkes's test more painful in supination than pronation.  No obvious scapular dysfunction    Reviewed x-rays obtained recently (-).       Assessment/Plan:   ASSESSMENT: Left shoulder injury with rotator cuff tendinopathy, possible acute rotator cuff tear given increasing pain and disability.    PLAN: Continue ROM.  Obtain MRI of left shoulder.  Depending on structural change, consider PT or surgical consultation.  Phone f/U      Marcos Rincon MD

## 2019-12-02 ENCOUNTER — ANCILLARY PROCEDURE (OUTPATIENT)
Dept: MRI IMAGING | Facility: CLINIC | Age: 69
End: 2019-12-02
Attending: SPECIALIST
Payer: COMMERCIAL

## 2019-12-02 DIAGNOSIS — M25.512 LEFT SHOULDER PAIN, UNSPECIFIED CHRONICITY: ICD-10-CM

## 2019-12-03 DIAGNOSIS — M25.512 ACUTE PAIN OF LEFT SHOULDER: Primary | ICD-10-CM

## 2019-12-03 NOTE — TELEPHONE ENCOUNTER
RECORDS RECEIVED FROM: Injury to Left shoulder. referred by Dr. Rincon. Ct scan in [facility]   DATE RECEIVED: Jan 8, 2020     NOTES STATUS DETAILS   OFFICE NOTE from referring provider Internal  Marcos Rincon MD    OFFICE NOTE from other specialist N/A    DISCHARGE SUMMARY from hospital N/A    DISCHARGE REPORT from the ER N/A    OPERATIVE REPORT N/A    MEDICATION LIST Internal    IMPLANT RECORD/STICKER N/A    LABS     CBC/DIFF N/A    CULTURES N/A    INJECTIONS DONE IN RADIOLOGY N/A    MRI Internal    CT SCAN N/A    XRAYS (IMAGES & REPORTS) Internal    TUMOR     PATHOLOGY  Slides & report N/A      12/03/19   12:44 PM   Pre-visit complete  Lucila Glamm, CMA

## 2019-12-05 NOTE — PROGRESS NOTES
Name: Ciera Casas MRN: [id number]  : 1950  GRIFFIN: 2019  Staff: CARLOS  Tech: BOO Age: 69  Sex: Female  Hand: Left Educ: 18  Vision: 20/40 ?with correction / ?without correction    ORIENTATION     Time  -0     Place       Personal info          Presidents     WRAT4   SS %ile Grade Equiv.     Word Reading  101 53 12.5     Math Computation 95 37 6.1    WAIS-IV     VCI: ~108    ESTELA: ~75     FSIQ:   ~73     WMI: 58    PSI: 71       Raw SSa     Similarities  26 11     Vocabulary  44 12     Block Design  16 5     Matrix Reasoning 7 6     Digit Span  11 2 RDS= 5     Arithmetic  6 3     Symbol Search  14 5     Coding  24 4    COWAT (CFL)     Raw: 34  SS: 10 %ile: 41-59    ANIMAL NAMING TEST     Raw: 12  SS: 6 T: 29    BOSTON NAMING TEST     Raw: 55  SS: 10 %ile: 41-59    MANN SENTENCE REPETITION      Raw: 8/14 %ile: 3    CLOCK DRAWING: Borderline    FINGER TAPPING   Avg  SS T     RH  25 2 27     LH 28.33 3 32     TRAILMAKING TEST   Raw  Err SS %ile     A 72  2 5 3-5     B 312  5 2 <1    PORTEUS MAZE TEST     Test Age: 8.0    ANA MARIA-O    Raw    T %ile     Time to Copy  245      >16     Copy    9.5     <1     Short Delay Recall 10.5 41 18     Long Delay Recall 7.5 33 4     Recognition Total 17 32 4    WMS-IV  Raw SS / %ile     LM I  32 10     LM II  21 11     LM Recog. 23 >75th     Symbol Span 5 4    HVLT-R     Trial 1 2 3      7 9 10  Raw T     Total Learning (1-3) 26 54     Delayed Recall  7 44     Percent Retention 70 41     Recognition Hits/FP 12/0 56    BVMT-R     Trial 1 2 3      2 2 3  Raw T / %ile     Total Learning (1-3) 7 25     Delayed Recall  3 28     Percent Retention 100 >16th     Recognition Hits/FP 5/0 >16th     BDI-II     Raw:  20 Interpretation: moderate    STAI     S: 48; %ile: 97 Interpretation: mild+     T: 48; %ile: 97 Interpretation: mild+
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
## 2019-12-09 ENCOUNTER — OFFICE VISIT (OUTPATIENT)
Dept: FAMILY MEDICINE | Facility: CLINIC | Age: 69
End: 2019-12-09
Payer: COMMERCIAL

## 2019-12-09 VITALS
RESPIRATION RATE: 15 BRPM | DIASTOLIC BLOOD PRESSURE: 80 MMHG | SYSTOLIC BLOOD PRESSURE: 114 MMHG | TEMPERATURE: 96.3 F | WEIGHT: 175.25 LBS | HEART RATE: 77 BPM | OXYGEN SATURATION: 95 % | BODY MASS INDEX: 29.16 KG/M2

## 2019-12-09 DIAGNOSIS — F41.8 DEPRESSION WITH ANXIETY: ICD-10-CM

## 2019-12-09 DIAGNOSIS — B35.4 TINEA CORPORIS: ICD-10-CM

## 2019-12-09 DIAGNOSIS — R21 RASH AND NONSPECIFIC SKIN ERUPTION: Primary | ICD-10-CM

## 2019-12-09 DIAGNOSIS — R41.89 COGNITIVE CHANGES: ICD-10-CM

## 2019-12-09 DIAGNOSIS — L30.4 ERYTHEMA INTERTRIGO: ICD-10-CM

## 2019-12-09 PROCEDURE — 99214 OFFICE O/P EST MOD 30 MIN: CPT | Performed by: FAMILY MEDICINE

## 2019-12-09 PROCEDURE — 99207 C PAF COMPLETED  NO CHARGE: CPT | Performed by: FAMILY MEDICINE

## 2019-12-09 RX ORDER — PRENATAL VIT 91/IRON/FOLIC/DHA 28-975-200
COMBINATION PACKAGE (EA) ORAL 2 TIMES DAILY
Qty: 42 G | Refills: 0 | Status: SHIPPED | OUTPATIENT
Start: 2019-12-09 | End: 2019-12-30

## 2019-12-09 RX ORDER — NYSTATIN 100000 U/G
OINTMENT TOPICAL 2 TIMES DAILY
Qty: 30 G | Refills: 0 | Status: SHIPPED | OUTPATIENT
Start: 2019-12-09 | End: 2019-12-30

## 2019-12-09 ASSESSMENT — ANXIETY QUESTIONNAIRES
IF YOU CHECKED OFF ANY PROBLEMS ON THIS QUESTIONNAIRE, HOW DIFFICULT HAVE THESE PROBLEMS MADE IT FOR YOU TO DO YOUR WORK, TAKE CARE OF THINGS AT HOME, OR GET ALONG WITH OTHER PEOPLE: SOMEWHAT DIFFICULT
3. WORRYING TOO MUCH ABOUT DIFFERENT THINGS: SEVERAL DAYS
5. BEING SO RESTLESS THAT IT IS HARD TO SIT STILL: NOT AT ALL
6. BECOMING EASILY ANNOYED OR IRRITABLE: NOT AT ALL
2. NOT BEING ABLE TO STOP OR CONTROL WORRYING: NOT AT ALL
1. FEELING NERVOUS, ANXIOUS, OR ON EDGE: NOT AT ALL
7. FEELING AFRAID AS IF SOMETHING AWFUL MIGHT HAPPEN: NOT AT ALL
GAD7 TOTAL SCORE: 2

## 2019-12-09 ASSESSMENT — PATIENT HEALTH QUESTIONNAIRE - PHQ9
SUM OF ALL RESPONSES TO PHQ QUESTIONS 1-9: 2
5. POOR APPETITE OR OVEREATING: SEVERAL DAYS

## 2019-12-09 NOTE — PATIENT INSTRUCTIONS
Rash looks like yeast skin infection in rectal area and but crack and upper inner thighs from chafing  Nystatin ointment to these areas twice a day for 1 month  The circular red rash on lower thighs looks more like ring worm and advised athletes foot cream to them twice a day up to 1 month after resolved even if not there so it doesnt come back   See dermatology if not better  Continue care with neurology and ortho  Follow up with PCP Tanvi for a wellness check, address mood, consider shingles vaccine and work on advance directives

## 2019-12-09 NOTE — PROGRESS NOTES
ESTABLISHED PATIENT NEUROLOGY NOTE    DATE OF VISIT: 12/10/2019  CLINIC LOCATION: [facility]  MRN: [id number]  PATIENT NAME: Ciera Casas  YOB: 1950    PCP: Sowmya Tinajero PA-C    REASON FOR VISIT:   Chief Complaint   Patient presents with     Follow Up     neuropsych      SUBJECTIVE:                                                      HISTORY OF PRESENT ILLNESS: Patient is here for follow up regarding cognitive difficulties.  She was last seen on 09/24/2019.  Please refer to my initial/other prior notes for further information.  She is accompanied by her significant other, Candis, who participates in interview.    Since the last visit, the patient reports no significant changes in her memory.  She denies interval development of new neurological symptoms.    Candis agrees that the patient's memory is worsening over the last 5 to 6 months.  He does not have any additional concerns.    Patient's echocardiogram was negative for PFO or intracardiac thrombus.  Moderate aortic stenosis was seen, and the patient was advised to discuss this finding with her primary care provider.  Cardiac monitoring did not show atrial fibrillation.    CPT from 09/19/2019 was 4.33/5.5, rounded down to 4.0/5.5 that would require 24-hour supervision and no driving.    Neuropsychologic evaluation from 11/5/2019 was abnormal, demonstrating prominent impairments in spatial information processing, attention, working memory, psychomotor speed, and executive functioning.  There was a very strong split between impaired spatial abilities and preserved verbal abilities.  Presentation felt consistent with non-amnestic mild cognitive impairment with etiologic possibilities including Lewy body disease versus posterior cortical atrophy.  Repeat testing was recommended in approximately 12 months.    On review of systems, patient endorses no other active complaints. Medications, allergies, family and social history  were also reviewed. There are no changes reported by patient.  REVIEW OF SYSTEMS:                                                    10-system review was completed. Pertinent positives are included in HPI. The remainder of ROS is negative.  EXAM:                                                    Physical Exam:   Vitals: /72 (BP Location: Left arm, Patient Position: Sitting, Cuff Size: Adult Regular)   Pulse 78   Temp 96.3  F (35.7  C) (Oral)   Wt 79.4 kg (175 lb)   SpO2 98%   BMI 29.12 kg/m      General: pt is in NAD, cooperative.  Skin: normal turgor, moist mucous membranes, no lesions/rashes noticed.  HEENT: ATNC, white sclera, normal conjunctiva.  Respiratory: Symmetric lung excursion, no accessory respiratory muscle use.  Abdomen: Non distended.  Neurological: awake, cooperative, follows commands, no exam changes compared to the previous visit.  DATA:   Data: I personally reviewed occupational therapy and neuropsychologic evaluation reports, as detailed in the history of present illness.  ASSESSMENT AND PLAN:                                                    Assessment: 69-year-old female patient with history of left temporoparietal stroke presents for follow-up of her cognitive dysfunction after the completion of recommended work-up.    We had a prolonged discussion with the patient and her partner regarding evaluation results, treatment options, and the proposed plan.  Stroke work-up is completed.  No changes in the secondary stroke prevention measures are needed.    Her clinical presentation of cognitive difficulties is most likely consistent with non-amnestic mild cognitive impairment.  Possible etiologies include history of prior stroke in the left temporoparietal region, microvascular changes, and potentially a neurodegenerative condition, such as an Alzheimer's disease (posterior cortical atrophy), versus other conditions.    If her cognitive changes are related to prior stroke, they would  not change over time, unless she has additional strokes.  However, if she has neurodegenerative condition (in addition to changes related to stroke), her cognitive dysfunction will progress over time.    We reviewed that at the present time there are no treatments for mild cognitive impairment, but her condition should be observed for interval worsening and progression into dementia.  For that reason, I would like to see her back in about 6 months from now to repeat her MoCA.  We will also plan to repeat neuropsychologic evaluation, as recommended, in approximately November 2020 (unless her MoCA remains stable throughout or improves).    In addition, her CPT suggests that she should not continue to drive and I discussed this with the patient and her partner.  We also reviewed that if she desires to continue driving, she should obtain formal behind the wheel occupational therapy driving evaluation.  Resources were provided.    Diagnoses:    ICD-10-CM    1. Mild cognitive impairment G31.84      Plan: At today's visit we thoroughly discussed evaluation results (mild cognitive impairment), available treatment options, and the plan.  I would like to repeat cognitive testing in approximately 6 months from now.    I discussed with the patient and her partner that based on results of evaluation, she should not drive unless she passes occupational therapy behind the wheel driving evaluation.  Resources were provided.    No new medications.    I counseled the patient to stay physically and mentally active with particular emphasis on daily mentally stimulating activities of  her choice (such as crosswords, puzzles, sudoku, etc.), stretching exercises, walking, and healthy eating.    Next follow-up appointment is in the next 6 months or earlier if needed.    Total Time: 46 minutes with > 50% spent counseling the patient and her significant other on stated above assessment and recommendations.    Tony Moore,  MD  [facility] Neurology

## 2019-12-09 NOTE — PROGRESS NOTES
Subjective     Ciera Casas is a 69 year old female who presents to clinic today for the following health issues:    HPI   Rash    Duration: 10/2019    Description  Location:  Inner thigh  Itching: moderate    Intensity:  moderate    Accompanying signs and symptoms: itchy and redness. Patchy     History (similar episodes/previous evaluation): None    Precipitating or alleviating factors: pt state have been letting their cat sleep with them.   New exposures:  None  Recent travel: no      Therapies tried and outcome:  antibacterial soap bars, washing pajamas      BMI 28, retired, takes care of her significant other who is on dialysis. Is sedentary. No stroke symptoms, Nonobstructive CAD - calcium CT scan 2/12/19 - score 1507, left main and RCA atherosclerotic plaque, no specific stenosis; coronary angio CT: no obstructive CAD. On atorvastatin, asa and fish oil, hx of moderate aortic stenosis, no symptoms, No H/O atrial fibrillation. She saw neurology 9/6/19 for cognitive changes. A brain MRI done 1/2019 at [facility] for dizziness  was reviewed then and it suggested prior stroke. Neuro psyche testing nov 2019 did reveal mild cognitive impairment. An echo and ziopatch were ordered to assess for potential cardiac sources of stroke. Ziopatch only 4 days was all sinus. She was in ED 9/8/19 with an anxiety attack. EKG at that time showed sinus tachycardia. Echo showed moderate aortic stenosis moderate, and not source for prior stroke. Seen by cardiology who discussed with patient consideration for an implantable loop recorder to look for atrial fibrillation which could be related to cognitive impairment. If Afib found then chronic anticoagulation would be indicated. She was reluctant to do that. Hx of abnormal glucose, depression and anxiety on Buspar, vit d and magnesium, remote hepatitis hx, prior laminectomy, hx of prior right hand, fracture and ORIF and pain, hx of shoulder pain, allergic to oxybutynin, under care of PCP  Tanvi, seen by her in sept, seen by Dr Lee 11/12/19 for left shoulder pain and referred to ortho, seen by ortho and given diclofenac gel but not used. Mri left shoulder 11/21 showed a rotator cuff tear and referred to a surgeon.   New to me. Here for a rash on thighs.  Her  drove her here.    Notes rash off and on bilateral inner thighs, comes and goes. Not itching now. Only itches in crotch area, feels now rectal area getting affected. Gets darker red, no pain, has had no new clothing, detergents, soaps, changes bedding about 2 a week, wears a different night gown nightly. Has had no new lotions, No new food, medications. No one else has a rash. Cat may have had fleas    No fever or chills, no headache or dizziness, no double or blurry vision, no facial pain, earache, sore throat, runny nose, post nasal drip, no trouble hearing, smelling, tasting or swallowing, no cough , no chest pain, trouble breathing or palpitations, No abdominal pain, heart burn, reflux, nausea or vomiting or diarrhea or constipation, no blood in stools or black stools, no weight loss or night sweats. No dysuria, hematuria, frequency, urgency, hesitancy, incontinence, No pelvic complaints. No leg swelling.  Has shoulder pain and will be seeing a surgeon for a second opinion.    SAURABH-7   [employer], 2002; Used with Permission) 12/9/2019   1. Feeling nervous, anxious, or on edge 0   2. Not being able to stop or control worrying 0   3. Worrying too much about different things 1   4. Trouble relaxing 1   5. Being so restless that it is hard to sit still 0   6. Becoming easily annoyed or irritable 0   7. Feeling afraid, as if something awful might happen 0   SAURABH-7 Total Score 2   If you checked any problems, how difficult have they made it for you to do your work, take care of things at home, or get along with other people? Somewhat difficult   PHQ-9 ([employer]) 12/9/2019   1.  Little interest or pleasure in doing things 0   2.  Feeling  down, depressed, or hopeless 0   3.  Trouble falling or staying asleep, or sleeping too much 1   4.  Feeling tired or having little energy 0   5.  Poor appetite or overeating 0   6.  Feeling bad about yourself 0   7.  Trouble concentrating 1   8.  Moving slowly or restless 0   9.  Suicidal or self-harm thoughts 0   PHQ-9 Total Score 2   Difficulty at work, home, or with people Somewhat difficult     Patient Active Problem List   Diagnosis     Overweight     Depression with anxiety     Chronic right shoulder pain     Coronary artery disease involving native coronary artery of native heart without angina pectoris     Cervical cancer screening     Cognitive changes     Past Surgical History:   Procedure Laterality Date     APPENDECTOMY       CLOSED REDUCTION, PERCUTANEOUS PINNING FINGER, COMBINED  4/6/2012    Procedure:COMBINED CLOSED REDUCTION, PERCUTANEOUS PINNING FINGER; Right Ring Finger Proximal Fracture Closed Reduction Internal Fixation, percutaneous pinning  Choice anesthesia; Surgeon:MILDRED DICKSON; [facility]     LAMINECT/DISCECTOMY, LUMBAR  1992    laminectomy       Social History     Tobacco Use     Smoking status: Former Smoker     Packs/day: 0.50     Years: 30.00     Pack years: 15.00     Types: Cigarettes     Last attempt to quit: 2/1/2009     Years since quitting: 10.8     Smokeless tobacco: Never Used   Substance Use Topics     Alcohol use: Yes     Comment: a couple of drinks on the weekend     Family History   Problem Relation Age of Onset     Cancer Mother         lung     Respiratory Mother         Emphysema     Cancer Father         esophagus     Osteoporosis Paternal Grandmother      Lipids Paternal Grandmother      Hypertension Maternal Aunt      Alzheimer Disease Other         fathers mother sister     Connective Tissue Disorder Other         lupus cousin     Depression Maternal Aunt      Respiratory Paternal Aunt         Asthma     Respiratory Other         neice     Respiratory Other          Asthma second cousin         Current Outpatient Medications   Medication Sig Dispense Refill     aspirin 81 MG tablet Take 81 mg by mouth daily       atorvastatin (LIPITOR) 40 MG tablet Take 1 tablet (40 mg) by mouth daily 90 tablet 3     busPIRone (BUSPAR) 10 MG tablet Take 1 tablet (10 mg) by mouth 3 times daily 90 tablet 3     Cholecalciferol (VITAMIN D3 PO) Take by mouth daily       fish oil-omega-3 fatty acids 1000 MG capsule Take 2 g by mouth daily       MAGNESIUM PO        nystatin (MYCOSTATIN) 032890 UNIT/GM external ointment Apply topically 2 times daily To redness in gluteal cleft, rectal area, upper inner thighs red areas 1 month 30 g 0     terbinafine (LAMISIL) 1 % external cream Apply topically 2 times daily Apply to circular red scaly areas on mid and lower thighs twice a day up o 1 month after resolved 42 g 0     Allergies   Allergen Reactions     Oxybutynin Difficulty breathing and Shortness Of Breath     Recent Labs   Lab Test 09/08/19  1201 09/06/19  1151 02/25/19  1437 04/05/18  1453 02/08/18  1149  12/13/16  0909   A1C  --  5.3  --   --   --   --   --    LDL  --   --  78  --  65  --  56   HDL  --   --  71  --  84  --  80   TRIG  --   --  77  --  72  --  53   ALT 31  --  34 39 76*  --   --    CR 0.84  --  0.96 0.98 0.85  --   --    GFRESTIMATED 71  --  61 56* 67  --   --    GFRESTBLACK 83  --  70 68 81  --   --    POTASSIUM 3.4  --  4.8 4.7 4.6  --   --    TSH  --  0.47  --  0.42 0.53   < >  --     < > = values in this interval not displayed.      BP Readings from Last 3 Encounters:   12/09/19 114/80   11/12/19 102/62   10/24/19 120/72    Wt Readings from Last 3 Encounters:   12/09/19 79.5 kg (175 lb 4 oz)   11/21/19 78.5 kg (173 lb 1 oz)   11/15/19 78.5 kg (173 lb)         Reviewed and updated as needed this visit by Provider         Review of Systems   ROS COMP: Constitutional, HEENT, cardiovascular, pulmonary, GI, , musculoskeletal, neuro, skin, endocrine and psych systems are negative,  except as otherwise noted.      Objective    /80 (BP Location: Left arm, Patient Position: Chair, Cuff Size: Adult Regular)   Pulse 77   Temp 96.3  F (35.7  C) (Tympanic)   Resp 15   Wt 79.5 kg (175 lb 4 oz)   SpO2 95%   BMI 29.16 kg/m    Body mass index is 29.16 kg/m .  Physical Exam   GENERAL: alert, no distress, over weight, elderly and fatigued  EYES: Eyes grossly normal to inspection, PERRL and conjunctivae and sclerae normal  HENT: ear canals and TM's normal, nose and mouth without ulcers or lesions  NECK: no adenopathy, no asymmetry, masses, or scars and thyroid normal to palpation  RESP: lungs clear to auscultation - no rales, rhonchi or wheezes  CV: regular rate and rhythm, normal S1 S2, no S3 or S4, no murmur, click or rub, no peripheral edema and peripheral pulses strong  ABDOMEN: soft, non tender, no hepatosplenomegaly, no masses and bowel sounds normal  MS: no gross musculoskeletal defects noted, no edema, limited range of motion left shoulder due to pain.  SKIN: Erythema and maceration in gluteal cleft and around perineal rectal area.  Upper inner thighs show some chafing and sloughing of skin about 3 cm each, mirror images of each other, lower on the mid anterior and lateral thighs she has annular areas of erythema that are scaly dry with mild central clearing not raised and are non tender or non pruritic to touch.  NEURO: Normal strength and tone, mentation intact and speech normal  PSYCH: concentration poor, affect normal/bright, anxious, fatigued and appearance well groomed    Diagnostic Test Results:  Labs reviewed in Epic  No results found for this or any previous visit (from the past 24 hour(s)).        Assessment & Plan       ICD-10-CM    1. Rash and nonspecific skin eruption R21 DERMATOLOGY REFERRAL   2. Erythema intertrigo L30.4 nystatin (MYCOSTATIN) 115221 UNIT/GM external ointment   3. Tinea corporis B35.4 terbinafine (LAMISIL) 1 % external cream   4. Cognitive changes R41.89   "  5. Depression with anxiety F41.8      Rash looks like yeast skin infection in rectal area and gluteal cleft/ perineal area and some upper inner thighs chafing  Nystatin ointment to these areas twice a day for 1 month  The circular red rash on lower thighs looks more like ring worm and advised athletes foot cream to them twice a day up to 1 month after resolved even if not there so it doesnt come back.  If not better can do a steroid ointment in case it is nummular eczema.  Lesions do not look like flea bites.  See dermatology if not better.  Furl in place.  Has history of some cognitive issues but seems to comprehend what we discussed today.  Declines to make a follow-up with her primary or schedule a mammogram today for leaving.  Continue care with neurology and ortho  Follow up with DELIA Tinajero for a wellness check, address mood, consider shingles vaccine and work on advance directives.    BMI:   Estimated body mass index is 29.16 kg/m  as calculated from the following:    Height as of 11/21/19: 1.651 m (5' 5\").    Weight as of this encounter: 79.5 kg (175 lb 4 oz).   Weight management plan: Patient was referred to their PCP to discuss a diet and exercise plan.  Work on weight loss  Regular exercise  See Patient Instructions    Return in about 4 weeks (around 1/6/2020) for Physical Exam with DELIA Tinajero.    Teresa Spicer MD  [facility]      "

## 2019-12-09 NOTE — PATIENT INSTRUCTIONS
AFTER VISIT SUMMARY (AVS):    At today's visit we thoroughly discussed evaluation results (mild cognitive impairment), available treatment options, and the plan.  I would like to repeat cognitive testing in approximately 6 months from now.    Based on results of evaluation, you should not drive unless you pass occupational therapy behind the wheel driving evaluation.  Resources were provided.    No new medications.    Stay physically and mentally active with particular emphasis on daily mentally stimulating activities of your choice (such as crosswords, puzzles, sudoku, etc.), stretching exercises, walking, and healthy eating.    Next follow-up appointment is in the next 6 months or earlier if needed.    Please do not hesitate to call me with any questions or concerns.    Thanks.

## 2019-12-10 ENCOUNTER — OFFICE VISIT (OUTPATIENT)
Dept: NEUROLOGY | Facility: CLINIC | Age: 69
End: 2019-12-10
Payer: COMMERCIAL

## 2019-12-10 VITALS
SYSTOLIC BLOOD PRESSURE: 112 MMHG | HEART RATE: 78 BPM | TEMPERATURE: 96.3 F | OXYGEN SATURATION: 98 % | DIASTOLIC BLOOD PRESSURE: 72 MMHG | WEIGHT: 175 LBS | BODY MASS INDEX: 29.12 KG/M2

## 2019-12-10 DIAGNOSIS — G31.84 MILD COGNITIVE IMPAIRMENT: Primary | ICD-10-CM

## 2019-12-10 PROCEDURE — 99215 OFFICE O/P EST HI 40 MIN: CPT | Performed by: PSYCHIATRY & NEUROLOGY

## 2019-12-10 ASSESSMENT — ANXIETY QUESTIONNAIRES: GAD7 TOTAL SCORE: 2

## 2019-12-10 NOTE — LETTER
12/10/2019         RE: Ciera Casas  [address]  [facility] [address]        Dear Colleague,    Thank you for referring your patient, Ciera Casas, to the [facility]. Please see a copy of my visit note below.    ESTABLISHED PATIENT NEUROLOGY NOTE    DATE OF VISIT: 12/10/2019  CLINIC LOCATION: [facility]  MRN: [id number]  PATIENT NAME: Ciera Casas  YOB: 1950    PCP: Sowmya Tinajero PA-C    REASON FOR VISIT:   Chief Complaint   Patient presents with     Follow Up     neuropsych      SUBJECTIVE:                                                      HISTORY OF PRESENT ILLNESS: Patient is here for follow up regarding cognitive difficulties.  She was last seen on 09/24/2019.  Please refer to my initial/other prior notes for further information.  She is accompanied by her significant other, Candis, who participates in interview.    Since the last visit, the patient reports no significant changes in her memory.  She denies interval development of new neurological symptoms.    Candis agrees that the patient's memory is worsening over the last 5 to 6 months.  He does not have any additional concerns.    Patient's echocardiogram was negative for PFO or intracardiac thrombus.  Moderate aortic stenosis was seen, and the patient was advised to discuss this finding with her primary care provider.  Cardiac monitoring did not show atrial fibrillation.    CPT from 09/19/2019 was 4.33/5.5, rounded down to 4.0/5.5 that would require 24-hour supervision and no driving.    Neuropsychologic evaluation from 11/5/2019 was abnormal, demonstrating prominent impairments in spatial information processing, attention, working memory, psychomotor speed, and executive functioning.  There was a very strong split between impaired spatial abilities and preserved verbal abilities.  Presentation felt consistent with non-amnestic mild cognitive impairment with etiologic possibilities  including Lewy body disease versus posterior cortical atrophy.  Repeat testing was recommended in approximately 12 months.    On review of systems, patient endorses no other active complaints. Medications, allergies, family and social history were also reviewed. There are no changes reported by patient.  REVIEW OF SYSTEMS:                                                    10-system review was completed. Pertinent positives are included in HPI. The remainder of ROS is negative.  EXAM:                                                    Physical Exam:   Vitals: /72 (BP Location: Left arm, Patient Position: Sitting, Cuff Size: Adult Regular)   Pulse 78   Temp 96.3  F (35.7  C) (Oral)   Wt 79.4 kg (175 lb)   SpO2 98%   BMI 29.12 kg/m       General: pt is in NAD, cooperative.  Skin: normal turgor, moist mucous membranes, no lesions/rashes noticed.  HEENT: ATNC, white sclera, normal conjunctiva.  Respiratory: Symmetric lung excursion, no accessory respiratory muscle use.  Abdomen: Non distended.  Neurological: awake, cooperative, follows commands, no exam changes compared to the previous visit.  DATA:   Data: I personally reviewed occupational therapy and neuropsychologic evaluation reports, as detailed in the history of present illness.  ASSESSMENT AND PLAN:                                                    Assessment: 69-year-old female patient with history of left temporoparietal stroke presents for follow-up of her cognitive dysfunction after the completion of recommended work-up.    We had a prolonged discussion with the patient and her partner regarding evaluation results, treatment options, and the proposed plan.  Stroke work-up is completed.  No changes in the secondary stroke prevention measures are needed.    Her clinical presentation of cognitive difficulties is most likely consistent with non-amnestic mild cognitive impairment.  Possible etiologies include history of prior stroke in the left  temporoparietal region, microvascular changes, and potentially a neurodegenerative condition, such as an Alzheimer's disease (posterior cortical atrophy), versus other conditions.    If her cognitive changes are related to prior stroke, they would not change over time, unless she has additional strokes.  However, if she has neurodegenerative condition (in addition to changes related to stroke), her cognitive dysfunction will progress over time.    We reviewed that at the present time there are no treatments for mild cognitive impairment, but her condition should be observed for interval worsening and progression into dementia.  For that reason, I would like to see her back in about 6 months from now to repeat her MoCA.  We will also plan to repeat neuropsychologic evaluation, as recommended, in approximately November 2020 (unless her MoCA remains stable throughout or improves).    In addition, her CPT suggests that she should not continue to drive and I discussed this with the patient and her partner.  We also reviewed that if she desires to continue driving, she should obtain formal behind the wheel occupational therapy driving evaluation.  Resources were provided.    Diagnoses:    ICD-10-CM    1. Mild cognitive impairment G31.84      Plan: At today's visit we thoroughly discussed evaluation results (mild cognitive impairment), available treatment options, and the plan.  I would like to repeat cognitive testing in approximately 6 months from now.    I discussed with the patient and her partner that based on results of evaluation, she should not drive unless she passes occupational therapy behind the wheel driving evaluation.  Resources were provided.    No new medications.    I counseled the patient to stay physically and mentally active with particular emphasis on daily mentally stimulating activities of   her choice (such as crosswords, puzzles, sudoku, etc.), stretching exercises, walking, and healthy  eating.    Next follow-up appointment is in the next 6 months or earlier if needed.    Total Time: 46 minutes with > 50% spent counseling the patient and her significant other on stated above assessment and recommendations.    Tony Moore MD  / Neurology      Again, thank you for allowing me to participate in the care of your patient.        Sincerely,        Tony Moore MD

## 2019-12-26 DIAGNOSIS — L30.4 ERYTHEMA INTERTRIGO: ICD-10-CM

## 2019-12-26 DIAGNOSIS — B35.4 TINEA CORPORIS: ICD-10-CM

## 2019-12-26 RX ORDER — NYSTATIN 100000 U/G
OINTMENT TOPICAL 2 TIMES DAILY
Qty: 30 G | Refills: 0 | Status: CANCELLED | OUTPATIENT
Start: 2019-12-26

## 2019-12-26 RX ORDER — PRENATAL VIT 91/IRON/FOLIC/DHA 28-975-200
COMBINATION PACKAGE (EA) ORAL 2 TIMES DAILY
Qty: 42 G | Refills: 0 | Status: CANCELLED | OUTPATIENT
Start: 2019-12-26

## 2019-12-26 NOTE — TELEPHONE ENCOUNTER
Medication cued and routed to refill pool to run protocol    Thank You!  oRsita Dewitt, RN  Triage Nurse

## 2019-12-26 NOTE — TELEPHONE ENCOUNTER
"Requested Prescriptions   Pending Prescriptions Disp Refills     nystatin (MYCOSTATIN) 519001 UNIT/GM external ointment 30 g 0     Sig: Apply topically 2 times daily To redness in gluteal cleft, rectal area, upper inner thighs red areas 1 month  Last Written Prescription Date:  12/9/2019  Last Fill Quantity: 30g,  # refills: 0   Last Office Visit: 12/9/2019   Future Office Visit:            Antifungal Agents Passed - 12/26/2019 12:51 PM        Passed - Recent (12 mo) or future (30 days) visit within the authorizing provider's specialty     Patient has had an office visit with the authorizing provider or a provider within the authorizing providers department within the previous 12 mos or has a future within next 30 days. See \"Patient Info\" tab in inbasket, or \"Choose Columns\" in Meds & Orders section of the refill encounter.              Passed - Not Fluconazole or Terconazole      If oral Fluconazole or Terconazole, may refill if indicated in progress notes.           Passed - Medication is active on med list     _________________________________________________________________       LAMISIL) 1 % external cream   Last Written Prescription Date:  12/9/2019  Last Fill Quantity: 42g,   # refills: 0  Last Office Visit: 12/9/2019   Future Office visit:       Routing refill request to provider for review/approval because:  Drug not on the [facility], [facility] or [facility] refill protocol or controlled substance                          "

## 2019-12-30 DIAGNOSIS — L30.4 ERYTHEMA INTERTRIGO: ICD-10-CM

## 2019-12-30 DIAGNOSIS — B35.4 TINEA CORPORIS: ICD-10-CM

## 2019-12-30 RX ORDER — PRENATAL VIT 91/IRON/FOLIC/DHA 28-975-200
COMBINATION PACKAGE (EA) ORAL 2 TIMES DAILY
Qty: 42 G | Refills: 0 | Status: SHIPPED | OUTPATIENT
Start: 2019-12-30 | End: 2021-05-13

## 2019-12-30 RX ORDER — NYSTATIN 100000 U/G
OINTMENT TOPICAL 2 TIMES DAILY
Qty: 30 G | Refills: 0 | Status: SHIPPED | OUTPATIENT
Start: 2019-12-30 | End: 2021-05-13

## 2019-12-30 NOTE — TELEPHONE ENCOUNTER
Pt called back to speak to care team.   She is now stating she lost the scripts below and need more refills.        Valerie PADILLA     [facility]

## 2019-12-30 NOTE — TELEPHONE ENCOUNTER
Please call and triage and see if pt made her appointment with derm. Also, if her rash is any better from her appointment with JOSEFINA Spicer on 12/9  (I would but am at home)  Thanks!     Ninfa Peña RN

## 2019-12-30 NOTE — TELEPHONE ENCOUNTER
"Requested Prescriptions   Pending Prescriptions Disp Refills     nystatin (MYCOSTATIN) 001425 UNIT/GM external ointment 30 g 0     Sig: Apply topically 2 times daily To redness in gluteal cleft, rectal area, upper inner thighs red areas 1 month  Last Written Prescription Date:  12/9/2019  Last Fill Quantity: 30g,  # refills: 0   Last Office Visit: 12/9/2019   Future Office Visit:            Antifungal Agents Passed - 12/30/2019  9:29 AM        Passed - Recent (12 mo) or future (30 days) visit within the authorizing provider's specialty     Patient has had an office visit with the authorizing provider or a provider within the authorizing providers department within the previous 12 mos or has a future within next 30 days. See \"Patient Info\" tab in inbasket, or \"Choose Columns\" in Meds & Orders section of the refill encounter.            Passed - Not Fluconazole or Terconazole      If oral Fluconazole or Terconazole, may refill if indicated in progress notes.           Passed - Medication is active on med list     _________________________________________________________________       terbinafine (LAMISIL) 1 % external cream    Last Written Prescription Date:  12/9/2019  Last Fill Quantity: 42g,   # refills: 0  Last Office Visit:12/9/2019  Future Office visit:       Routing refill request to provider for review/approval because:  Drug not on the [facility] or [facility] refill protocol or controlled substance                          "

## 2019-12-30 NOTE — TELEPHONE ENCOUNTER
Reason for Call:  Medication or medication refill:    Do you use a [facility]?  Name of the pharmacy and phone number for the current request:  [facility] - [phone]    Name of the medication requested: nystatin (MYCOSTATIN) 773653 UNIT/GM external ointment, terbinafine (LAMISIL) 1 % external cream    Other request: Pt called in stated Dr never called in these meds I explained to her the meds were sent to  pharmacy , I put her on hold called  pharmacy they stated that Pt picked up the meds on 12/09/2019 , when returned to pt she proceed to tell me that she never got them , I told her the pharmacy did say she picked them up ... Then pt said ok fine I did pick them up and I used them both I want more !     Can we leave a detailed message on this number? YES    Phone number patient can be reached at: Home number on file [phone] (home)    Best Time: ASAP    Call taken on 12/30/2019 at 9:24 AM by NICKI Bravo  [place]   Patient Rep

## 2019-12-30 NOTE — TELEPHONE ENCOUNTER
Julio/Dr. Spicer-Please review and sign if agree.    Patient reports lost medications.      Thank you!  CHARLIE SladeN, RN

## 2019-12-31 NOTE — TELEPHONE ENCOUNTER
Pt returned phone call to clinic, no triage RN available. Please call pt back when able. Thanks!    Ninfa Campbell  [employer] Patient Rep

## 2019-12-31 NOTE — TELEPHONE ENCOUNTER
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 ( see dr hough note 12/10)   This might be reason why story is changing about these meds

## 2019-12-31 NOTE — TELEPHONE ENCOUNTER
Writer called patient and informed her medications refilled.    Patient verbalized understanding and in agreement with plan.    Patient stated she plans to stop by pharmacy to  medications.  CHARLIE SladeN, RN

## 2020-01-06 ENCOUNTER — DOCUMENTATION ONLY (OUTPATIENT)
Dept: CARE COORDINATION | Facility: CLINIC | Age: 70
End: 2020-01-06

## 2020-01-06 ENCOUNTER — TELEPHONE (OUTPATIENT)
Dept: ORTHOPEDICS | Facility: CLINIC | Age: 70
End: 2020-01-06

## 2020-01-06 NOTE — TELEPHONE ENCOUNTER
Called pt to informed her that she was scheduled incorrectly and will give pt another call when Dr. Simeon [facility] opens to schedule hernan Scherer ATC

## 2020-01-08 ENCOUNTER — PRE VISIT (OUTPATIENT)
Dept: ORTHOPEDICS | Facility: CLINIC | Age: 70
End: 2020-01-08

## 2020-01-17 ENCOUNTER — OFFICE VISIT (OUTPATIENT)
Dept: ORTHOPEDICS | Facility: CLINIC | Age: 70
End: 2020-01-17
Payer: COMMERCIAL

## 2020-01-17 DIAGNOSIS — M75.122 NONTRAUMATIC COMPLETE TEAR OF LEFT ROTATOR CUFF: Primary | ICD-10-CM

## 2020-01-17 RX ORDER — MELOXICAM 15 MG/1
15 TABLET ORAL DAILY
Qty: 30 TABLET | Refills: 0 | Status: SHIPPED | OUTPATIENT
Start: 2020-01-17 | End: 2020-03-27

## 2020-01-17 ASSESSMENT — ENCOUNTER SYMPTOMS
SPEECH CHANGE: 0
PARALYSIS: 0
LOSS OF CONSCIOUSNESS: 0
HALLUCINATIONS: 0
POLYPHAGIA: 0
FATIGUE: 0
MEMORY LOSS: 1
TREMORS: 0
INCREASED ENERGY: 0
FEVER: 0
POLYDIPSIA: 0
DIZZINESS: 1
SKIN CHANGES: 0
DECREASED APPETITE: 1
WEAKNESS: 0
TINGLING: 0
DISTURBANCES IN COORDINATION: 0
SEIZURES: 0
WEIGHT LOSS: 0
NIGHT SWEATS: 0
NUMBNESS: 0
NAIL CHANGES: 0
WEIGHT GAIN: 1
HEADACHES: 0
POOR WOUND HEALING: 0
CHILLS: 0
ALTERED TEMPERATURE REGULATION: 1

## 2020-01-17 NOTE — LETTER
1/17/2020       RE: Ciera Casas  [address]  [facility] [address]     Dear Colleague,    Thank you for referring your patient, Ciera Casas, to the [facility] at [facility]. Please see a copy of my visit note below.    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    HPI:   Ciera Casas is a 69 year old, left-hand dominant female who presents for evaluation of left shoulder pain.  Symptoms started in September of 2019. There a precipitating event where Ciera was trying to move a humidifier up the stairs from her basement.  She states that the humidifier slipped and she had a sudden pain in her left shoulder. The pain is located to the anterolateral left shoulder. Worst pain is rated a 6 of 10. Symptoms are worsened by reaching and lifting. Symptoms are improved with rest. Patient has tried Aleve and a few basic exercises on her own with minimal relief. Associated symptoms include some left-sided neck soreness. Notably, the patient has had no prior shoulder elbow surgery. No other concerns or complaints at this time.    PAST MEDICAL HISTORY:  1. Anxiety  2. Hyperlipidemia    CURRENT MEDICATIONS:  1. Atorvastatin  2. Buspirone  3. Aspirin 81 mg daily    ALLERGIES:   Oxybutynin    PAST SURGICAL HISTORY:  1. Laminectomy 1992  2. Appendectomy    FAMILY HISTORY: No known family history of bleeding, clotting, or anesthesia related complications.    SOCIAL HISTORY: Patient is here with her significant other Demetrio. They live in [place]. Retired . She does not exercise.    TOXIC HABITS:  I spoke with Ciera today regarding tobacco use and they informed me that they do not use any tobacco  products.      PHYSICAL EXAM:  Patient is Data Unavailable and weighs 0 lbs 0 oz. There were no vitals taken for this visit.  Constitutional: Well-developed, well-nourished, healthy appearing female.  Skin: Warm, dry, and without rashes.   HEENT: Normal.  Cardiac: Well perfused extremities, strong 2+ peripheral pulses. No edema.   Pulmonary: Non-labored respirations on room air without audible wheezes.   Abdomen: Soft, nontender.  Musculoskeletal: Patient ambulates with a slow, symmetric, and steady gait. Active range of motion of the left shoulder is 165/75/45/L2 compared to 170/80/45/T10 on the right. Left shoulder has positive Neer, Soler, and Pedro, negative on the right side. No weakness with external rotation strength testing at the side bilaterally. No AC, SC, biceps signs, cross-body adduction, scars, atrophy, deformity, belly-press, lift-off, external rotation lag sign, internal rotation lag sign, hornblower's bilaterally. No generalized ligamentous laxity. Neurovascular exam and cervical spine exam are normal. Normal Spurling's.    IMAGING:   Left shoulder 4 view radiographic series was personally reviewed by me.  This demonstrates well-maintained joint spaces, minimal glenohumeral arthrosis, no evidence of fracture or dislocation.    MRI of the left shoulder dated 12/2/2019 was personally reviewed by me.  This demonstrates a small full-thickness far anterior supraspinatus tendon tear.  Biceps tendinopathy.  Subacromial bursitis.  No significant cuff atrophy.  Degenerative changes about the labrum.  Intra-articular synovitis noted.    IMPRESSION: 69 year old-year-old left hand dominant female, with left shoulder small full-thickness supraspinatus tendon tear, subacromial bursitis.     PLAN:   I had a long discussion with Tatianna today.  At the present time she has a very small far anterior full-thickness supraspinatus tendon tear without any atrophy.  She has not tried any significant nonoperative  management at this point.  I recommended a course of oral nonsteroidal anti-inflammatory drugs with meloxicam 15 mg daily for 30 days with no refills.  She should also started on a course of physical therapy with 1 of our shoulder therapists here in [place].  I have asked that she sees Joshua Castro if possible.  I would like to see her back in approximately 3 months to evaluate her progress.  If her symptoms are worsening at that point we may consider a corticosteroid injection versus discussion of possible surgical intervention.    At the conclusion of the office visit, Ciera verbally acknowledged that I answered all of her questions satisfactorily.    Again, thank you for allowing me to participate in the care of your patient.      Sincerely,    Wayne Simeon MD

## 2020-01-17 NOTE — PROGRESS NOTES
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    HPI:   Ciera Casas is a 69 year old, left-hand dominant female who presents for evaluation of left shoulder pain.  Symptoms started in September of 2019. There a precipitating event where Ciera was trying to move a humidifier up the stairs from her basement.  She states that the humidifier slipped and she had a sudden pain in her left shoulder. The pain is located to the anterolateral left shoulder. Worst pain is rated a 6 of 10. Symptoms are worsened by reaching and lifting. Symptoms are improved with rest. Patient has tried Aleve and a few basic exercises on her own with minimal relief. Associated symptoms include some left-sided neck soreness. Notably, the patient has had no prior shoulder elbow surgery. No other concerns or complaints at this time.    PAST MEDICAL HISTORY:  1. Anxiety  2. Hyperlipidemia    CURRENT MEDICATIONS:  1. Atorvastatin  2. Buspirone  3. Aspirin 81 mg daily    ALLERGIES:   Oxybutynin    PAST SURGICAL HISTORY:  1. Laminectomy 1992  2. Appendectomy    FAMILY HISTORY: No known family history of bleeding, clotting, or anesthesia related complications.    SOCIAL HISTORY: Patient is here with her significant other Demetrio. They live in [facility]. Retired . She does not exercise.    TOXIC HABITS:  I spoke with Ciera today regarding tobacco use and they informed me that they do not use any tobacco products.    REVIEW OF SYSTEMS:  Answers for HPI/ROS submitted by the patient on 1/17/2020   General Symptoms: Yes  Skin Symptoms: Yes  HENT Symptoms: No  EYE SYMPTOMS: No  HEART SYMPTOMS: No  LUNG SYMPTOMS: No  INTESTINAL SYMPTOMS: No  URINARY SYMPTOMS: No  GYNECOLOGIC SYMPTOMS: No  BREAST SYMPTOMS:  No  SKELETAL SYMPTOMS: No  BLOOD SYMPTOMS: No  NERVOUS SYSTEM SYMPTOMS: Yes  MENTAL HEALTH SYMPTOMS: No  Fever: No  Loss of appetite: Yes  Weight loss: No  Weight gain: Yes  Fatigue: No  Night sweats: No  Chills: No  Increased stress: No  Excessive hunger: No  Excessive thirst: No  Feeling hot or cold when others believe the temperature is normal: Yes  Loss of height: No  Post-operative complications: No  Surgical site pain: No  Hallucinations: No  Change in or Loss of Energy: No  Hyperactivity: No  Confusion: No  Changes in hair: No  Changes in moles/birth marks: No  Itching: Yes  Rashes: Yes  Changes in nails: No  Acne: No  Hair in places you don't want it: No  Change in facial hair: No  Warts: No  Non-healing sores: No  Scarring: No  Flaking of skin: No  Color changes of hands/feet in cold : No  Sun sensitivity: No  Skin thickening: No  Trouble with coordination: No  Dizziness or trouble with balance: Yes  Fainting or black-out spells: No  Memory loss: Yes  Headache: No  Seizures: No  Speech problems: No  Tingling: No  Tremor: No  Weakness: No  Difficulty walking: No  Paralysis: No  Numbness: No    PHYSICAL EXAM:  Patient is Data Unavailable and weighs 0 lbs 0 oz. There were no vitals taken for this visit.  Constitutional: Well-developed, well-nourished, healthy appearing female.  Skin: Warm, dry, and without rashes.   HEENT: Normal.  Cardiac: Well perfused extremities, strong 2+ peripheral pulses. No edema.   Pulmonary: Non-labored respirations on room air without audible wheezes.   Abdomen: Soft, nontender.  Musculoskeletal: Patient ambulates with a slow, symmetric, and steady gait. Active range of motion of the left shoulder is 165/75/45/L2 compared to 170/80/45/T10 on the right. Left shoulder has positive Neer, Soler, and Pedro, negative on the right side. No weakness with external rotation strength testing at the side bilaterally. No AC, SC, biceps signs, cross-body adduction, scars, atrophy, deformity,  belly-press, lift-off, external rotation lag sign, internal rotation lag sign, hornblower's bilaterally. No generalized ligamentous laxity. Neurovascular exam and cervical spine exam are normal. Normal Spurling's.    IMAGING:   Left shoulder 4 view radiographic series was personally reviewed by me.  This demonstrates well-maintained joint spaces, minimal glenohumeral arthrosis, no evidence of fracture or dislocation.    MRI of the left shoulder dated 12/2/2019 was personally reviewed by me.  This demonstrates a small full-thickness far anterior supraspinatus tendon tear.  Biceps tendinopathy.  Subacromial bursitis.  No significant cuff atrophy.  Degenerative changes about the labrum.  Intra-articular synovitis noted.    IMPRESSION: 69 year old-year-old left hand dominant female, with left shoulder small full-thickness supraspinatus tendon tear, subacromial bursitis.     PLAN:   I had a long discussion with Tatianna today.  At the present time she has a very small far anterior full-thickness supraspinatus tendon tear without any atrophy.  She has not tried any significant nonoperative management at this point.  I recommended a course of oral nonsteroidal anti-inflammatory drugs with meloxicam 15 mg daily for 30 days with no refills.  She should also started on a course of physical therapy with 1 of our shoulder therapists here in [place].  I have asked that she sees Joshua Castro if possible.  I would like to see her back in approximately 3 months to evaluate her progress.  If her symptoms are worsening at that point we may consider a corticosteroid injection versus discussion of possible surgical intervention.    At the conclusion of the office visit, Ciera verbally acknowledged that I answered all of her questions satisfactorily.

## 2020-01-17 NOTE — NURSING NOTE
Reason For Visit:   Chief Complaint   Patient presents with     Consult     Left shoulder pain.  Ref. Dr. Rincon in sports med       PCP: Sowmya Tinajero  Ref: Dr. Rincon    ?  No  Occupation Retired - worked in vocational evaluations.  Currently working? No.  Work status?  Retired.  Date of injury: September 2019  Type of injury: trying to move a dehumidifier  from the basement to up the stairs and it slipped in her arms. .  Date of surgery: NA  Type of surgery: NA.  Smoker: No  Request smoking cessation information: No    Left hand dominant    SANE score  Affected shoulder: Left  Right shoulder SANE: 100  Left shoulder SANE: 30    There were no vitals taken for this visit.      Pain Assessment  Patient Currently in Pain: Yes  0-10 Pain Scale: 6  Primary Pain Location: Shoulder(Left)  Pain Descriptors: Other (comment)(Comes and goes)  Alleviating Factors: Rest  Aggravating Factors: Other (comment), Reaching(Pain worse at night)    Bree Carballo, ATC

## 2020-01-18 ENCOUNTER — NURSE TRIAGE (OUTPATIENT)
Dept: NURSING | Facility: CLINIC | Age: 70
End: 2020-01-18

## 2020-01-19 NOTE — TELEPHONE ENCOUNTER
Patient states she is returning a call, not sure who it's from or what it's regarding. Nothing noted per [facility], advised to call PCP on Monday.  Sandi Amezcua RN [facility]        Reason for Disposition    [1] Follow-up call to recent contact AND [2] information only call, no triage required    Protocols used: INFORMATION ONLY CALL-A-

## 2020-01-24 ENCOUNTER — TELEPHONE (OUTPATIENT)
Dept: FAMILY MEDICINE | Facility: CLINIC | Age: 70
End: 2020-01-24

## 2020-01-24 NOTE — TELEPHONE ENCOUNTER
Reason for Call:  Other call back    Detailed comments: pt stated she wanted to speak to Dr. Moore nurse, said she is wondering where she is at within the cycle, what is she supposed to be doing today, where does she stand, does anything matter writer asked to clarify in regards to what, she said tests were done, also said someone called her and was asking her phone number and date of birth which scared her. Pt seemed very confused.    Phone Number Patient can be reached at: Home number on file [phone] (home)    Best Time: asap    Can we leave a detailed message on this number? YES    Call taken on 1/24/2020 at 8:33 AM by Karla Delgado

## 2020-01-27 NOTE — TELEPHONE ENCOUNTER
I called pt.  She talked at great length about her Physical Therapy on 2/4/2020 for her shoulder.  I did recommend completing this physical therapy.    I reviewed her next appt with Dr. Moore.  Pt says she wants to drop out of this program with neurology.  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  PT then said she is very private.  She said she would see Dr. Moore in June and then be done with neurology.    PT had not other questions for neurology.  PT would kind of ramble on with her thoughts.    I will forward to Dr. Moore.  No action needed.  ROS Cruz RN

## 2020-01-27 NOTE — TELEPHONE ENCOUNTER
Patient returned call on 1/27/20 but no one was available at the moment. Please reach out, thank you!

## 2020-01-27 NOTE — TELEPHONE ENCOUNTER
12/10/19 was last OV with Dr. Moore.    Left message on answering machine for patient to call clinic triage. I left this message returning pt's call.  FRANCESCO Cruz

## 2020-02-04 ENCOUNTER — THERAPY VISIT (OUTPATIENT)
Dept: PHYSICAL THERAPY | Facility: CLINIC | Age: 70
End: 2020-02-04
Attending: ORTHOPAEDIC SURGERY
Payer: COMMERCIAL

## 2020-02-04 DIAGNOSIS — M75.122 NONTRAUMATIC COMPLETE TEAR OF LEFT ROTATOR CUFF: ICD-10-CM

## 2020-02-04 PROCEDURE — 97110 THERAPEUTIC EXERCISES: CPT | Mod: GP | Performed by: PHYSICAL THERAPIST

## 2020-02-04 PROCEDURE — 97161 PT EVAL LOW COMPLEX 20 MIN: CPT | Mod: GP | Performed by: PHYSICAL THERAPIST

## 2020-02-04 NOTE — PROGRESS NOTES
Physical Therapy Initial Evaluation  February 4, 2020     MD Instructions/Precautions/Restrictions: PT eval and treat.     Therapist Impression:   Ciera Casas presents with findings consistent with L shoulder RC tear, with related impairments limiting her ability to reach in a variety of directions, lifting/carrying objects. Skilled PT services are necessary in order to reduce impairments and improve independent function.     Subjective:   CHIEF COMPLAINT: Left shoulder pain more than weakness (pt is LHD). Began in September of 2019, with recent event in which she was lifting a humidifier and exacerbated shoulder pain. PL 6/10.   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  Worsened by: Reaching out to side, lifting/carrying objects, reaching behind back.    Alleviated by: Avoid provocative positions.      General health as reported by patient: good  Pertinent medical/surgical history: Refer to health history in EMR.   Imaging: x-ray and MRI.   Prior treatment? PT   Patient's goals are: decrease pain.   Return to MD:  3 months.       Objective:  SHOULDER EXAMINATION    STATIC POSTURE  Forward head on neck, Increased thoracic kyphosis and Rounded shoulders     SHOULDER RANGE OF MOTION & STRENGTH  (*Indicates patient s pain)   AROM L AROM R MMT L MMT R   Flex 145* 145     * 145 4* 5-   ER 75* 65 4 4   IR   5 5   Ext/IR T10* T8       JOINT MOBILITY  NA    SPECIAL TESTS   (+) L: Full can   (-) L: Drop arm, belly press, lift off, ER lag  (+) R: NA   (-) R: NA        Assessment/Plan:    The patient is a 69 year old female with chief complaint of L shoulder pain.    The patient has the following significant findings with corresponding treatment plan.  Diagnosis 1:  Left shoulder full thickness rotator cuff tear (small, far  anterior supraspinatus tendon tear, no atrophy), subacromial bursitis    Pain -  hot/cold therapy, manual therapy, splint/taping/bracing/orthotics and self management  Decreased ROM/flexibility - manual therapy, therapeutic exercise and home program  Decreased joint mobility - manual therapy, therapeutic exercise and home program  Decreased strength - therapeutic exercise, therapeutic activities and home program  Impaired muscle performance - neuro re-education and home program  Decreased function - therapeutic activities and home program  Impaired posture - neuro re-education, therapeutic activities and home program      Therapy Evaluation Codes:   1) History comprised of:   Personal factors that impact the plan of care:      Please refer to health history in EMR.    Comorbidity factors that impact the plan of care are:      Please refer to health history in EMR.     Medications impacting care: None.  2) Examination of Body Systems comprised of:   Body structures and functions that impact the plan of care:      Shoulder.   Activity limitations that impact the plan of care are:      Bathing, Cooking, Dressing, Lifting and Reaching.   Clinical presentation characteristics are:    Stable/Uncomplicated.  3) Presentation comprised of:   Presentation scored as Low complexity with uncomplicated characteristics..  4) Decision-Making    Low complexity using standardized patient assessment instrument and/or measureable assessment of functional outcome.  Cumulative Therapy Evaluation is: Low complexity.    Previous and current functional limitations:  (See Goal Flow Sheet for this information)    Short term and Long term goals: (See Goal Flow Sheet for this information)     Communication ability:  Patient appears to be able to clearly communicate and understand verbal and written communication and follow directions correctly.  Treatment Explanation - The following has been discussed with the patient: RX ordered/plan of care,  anticipated outcomes, and possible risks and side effects.  This patient would benefit from PT intervention to resume normal activities.   Rehab potential is good.    Frequency:  1 X week, once daily  Duration:  for 2 weeks tapering to 2x/month for 3 months  Discharge Plan: Achieve all LTGs, be independent in home treatment program, and reach maximal therapeutic benefit.    Please refer to the daily flowsheet for treatment today, total treatment time and time spent performing 1:1 timed codes.

## 2020-02-11 ENCOUNTER — THERAPY VISIT (OUTPATIENT)
Dept: PHYSICAL THERAPY | Facility: CLINIC | Age: 70
End: 2020-02-11
Payer: COMMERCIAL

## 2020-02-11 DIAGNOSIS — M75.122 NONTRAUMATIC COMPLETE TEAR OF LEFT ROTATOR CUFF: ICD-10-CM

## 2020-02-11 PROCEDURE — 97110 THERAPEUTIC EXERCISES: CPT | Mod: GP | Performed by: PHYSICAL THERAPY ASSISTANT

## 2020-02-18 ENCOUNTER — THERAPY VISIT (OUTPATIENT)
Dept: PHYSICAL THERAPY | Facility: CLINIC | Age: 70
End: 2020-02-18
Payer: COMMERCIAL

## 2020-02-18 DIAGNOSIS — M75.122 NONTRAUMATIC COMPLETE TEAR OF LEFT ROTATOR CUFF: ICD-10-CM

## 2020-02-18 PROCEDURE — 97110 THERAPEUTIC EXERCISES: CPT | Mod: GP | Performed by: PHYSICAL THERAPIST

## 2020-03-03 ENCOUNTER — THERAPY VISIT (OUTPATIENT)
Dept: PHYSICAL THERAPY | Facility: CLINIC | Age: 70
End: 2020-03-03
Payer: COMMERCIAL

## 2020-03-03 DIAGNOSIS — M75.122 NONTRAUMATIC COMPLETE TEAR OF LEFT ROTATOR CUFF: ICD-10-CM

## 2020-03-03 PROCEDURE — 97112 NEUROMUSCULAR REEDUCATION: CPT | Mod: GP | Performed by: PHYSICAL THERAPIST

## 2020-03-03 PROCEDURE — 97110 THERAPEUTIC EXERCISES: CPT | Mod: GP | Performed by: PHYSICAL THERAPIST

## 2020-03-16 DIAGNOSIS — Z13.6 CARDIOVASCULAR SCREENING; LDL GOAL LESS THAN 160: ICD-10-CM

## 2020-03-16 RX ORDER — ATORVASTATIN CALCIUM 40 MG/1
TABLET, FILM COATED ORAL
Qty: 90 TABLET | Refills: 3 | Status: SHIPPED | OUTPATIENT
Start: 2020-03-16 | End: 2021-03-24

## 2020-03-19 ENCOUNTER — TELEPHONE (OUTPATIENT)
Dept: FAMILY MEDICINE | Facility: CLINIC | Age: 70
End: 2020-03-19

## 2020-03-19 ENCOUNTER — VIRTUAL VISIT (OUTPATIENT)
Dept: FAMILY MEDICINE | Facility: CLINIC | Age: 70
End: 2020-03-19
Payer: COMMERCIAL

## 2020-03-19 VITALS — WEIGHT: 169 LBS | BODY MASS INDEX: 28.12 KG/M2

## 2020-03-19 DIAGNOSIS — Z13.29 SCREENING FOR THYROID DISORDER: ICD-10-CM

## 2020-03-19 DIAGNOSIS — R41.89 COGNITIVE CHANGES: Primary | ICD-10-CM

## 2020-03-19 PROCEDURE — 99443 ZZC PHYSICIAN TELEPHONE EVALUATION 21-30 MIN: CPT | Performed by: PHYSICIAN ASSISTANT

## 2020-03-19 NOTE — PROGRESS NOTES
"Ciera Casas is a 69 year old female who is being evaluated via a billable telephone visit.      The patient has been notified of following:     \"This telephone visit will be conducted via a call between you and your physician/provider. We have found that certain health care needs can be provided without the need for a physical exam.  This service lets us provide the care you need with a short phone conversation.  If a prescription is necessary we can send it directly to your pharmacy.  If lab work is needed we can place an order for that and you can then stop by our lab to have the test done at a later time.    If during the course of the call the physician/provider feels a telephone visit is not appropriate, you will not be charged for this service.\"     Ciera Casas complains of    Chief Complaint   Patient presents with     Memory Loss       I have reviewed and updated the patient's Past Medical History, Social History, Family History and Medication List.    ALLERGIES  Oxybutynin    Additional provider notes:   Patient ready for next steps regarding her memory loss/cognitive changes.  Since seen by neurology 12/10/19, feels like memory in the morning is much worse than her baseline with increased hallucinations and inappropriate actions.  Neuro advised follow up in 6 months, but may need sooner follow up if other treatment options possible.    Patient taking Buspar but wondering if worsening symptoms related to medicine vs underlying cognitive impairment per neurology.    Patient hasn't been taking for the past 2-3 weeks - initially full 10 mg pill made her sleep for 20+ hours; then was taking 5 mg (half tab) maybe once a day but didn't try it to help her sleep and hasn't noticed any improvement or worsening in confusion symptoms throughout the day, especially in the am.    Wondering about next steps.    Talked with Candis, her partner, to clarify concerns - basically reported slow progression over the past " few months and ready to follow up with neurology to discuss treatment options to slow the progression if possible.    No fever, chills, night sweats, urinary symptoms, change in hydration or eating habits; no nausea, vomiting, diarrhea.      Assessment/Plan:  1. Cognitive changes  Patient to follow up with neurology as soon as possible. Appointment made today.    Phone call duration:  30 minutes    Sowmya Tinajero PA-C

## 2020-03-19 NOTE — TELEPHONE ENCOUNTER
"Would offer her a telephone visit, not sure what she means about parting ways with her PCP but she can see me or whoever she wants for suggested telephone visit.  I'd also briefly mention she should stop medicine is still having side effects with half a tab.    Thanks  Sowmya \"Julio\" SLAVA Tinajero   "

## 2020-03-19 NOTE — TELEPHONE ENCOUNTER
"Reason for Call:  Other call back    Detailed comments: Patient stopped into clinic and states that she has been taking buspirone since 2019. She states that after taking the medication she would sleep for 23 hour straight so in January she started to cut the pill in half. After taking half dosages she started to see people that aren't there, having conversations with her  mother, and calling the bank and reporting fraud on her bank account because she does not remember any of the transactions. She also mentioned that her and PCP \"had\" to part ways do to something she did. She needs to know what she should be doing.     Phone Number Patient can be reached at: Home number on file [phone] (home)    Best Time: Any     Can we leave a detailed message on this number? YES    Call taken on 3/19/2020 at 10:56 AM by Casey Frausto      "

## 2020-03-24 DIAGNOSIS — Z13.29 SCREENING FOR THYROID DISORDER: ICD-10-CM

## 2020-03-24 DIAGNOSIS — R41.89 COGNITIVE CHANGES: ICD-10-CM

## 2020-03-24 DIAGNOSIS — R82.90 ABNORMAL FINDING ON URINALYSIS: Primary | ICD-10-CM

## 2020-03-24 LAB
ALBUMIN SERPL-MCNC: 3.8 G/DL (ref 3.4–5)
ALBUMIN UR-MCNC: NEGATIVE MG/DL
ALP SERPL-CCNC: 77 U/L (ref 40–150)
ALT SERPL W P-5'-P-CCNC: 31 U/L (ref 0–50)
ANION GAP SERPL CALCULATED.3IONS-SCNC: 5 MMOL/L (ref 3–14)
APPEARANCE UR: CLEAR
AST SERPL W P-5'-P-CCNC: 15 U/L (ref 0–45)
BACTERIA #/AREA URNS HPF: ABNORMAL /HPF
BASOPHILS # BLD AUTO: 0 10E9/L (ref 0–0.2)
BASOPHILS NFR BLD AUTO: 0.4 %
BILIRUB SERPL-MCNC: 0.6 MG/DL (ref 0.2–1.3)
BILIRUB UR QL STRIP: NEGATIVE
BUN SERPL-MCNC: 20 MG/DL (ref 7–30)
CALCIUM SERPL-MCNC: 9.3 MG/DL (ref 8.5–10.1)
CHLORIDE SERPL-SCNC: 105 MMOL/L (ref 94–109)
CO2 SERPL-SCNC: 26 MMOL/L (ref 20–32)
COLOR UR AUTO: YELLOW
CREAT SERPL-MCNC: 0.8 MG/DL (ref 0.52–1.04)
DIFFERENTIAL METHOD BLD: ABNORMAL
EOSINOPHIL # BLD AUTO: 0.3 10E9/L (ref 0–0.7)
EOSINOPHIL NFR BLD AUTO: 3.8 %
ERYTHROCYTE [DISTWIDTH] IN BLOOD BY AUTOMATED COUNT: 12.9 % (ref 10–15)
GFR SERPL CREATININE-BSD FRML MDRD: 75 ML/MIN/{1.73_M2}
GLUCOSE SERPL-MCNC: 117 MG/DL (ref 70–99)
GLUCOSE UR STRIP-MCNC: NEGATIVE MG/DL
HCT VFR BLD AUTO: 42 % (ref 35–47)
HGB BLD-MCNC: 13.6 G/DL (ref 11.7–15.7)
HGB UR QL STRIP: NEGATIVE
KETONES UR STRIP-MCNC: NEGATIVE MG/DL
LEUKOCYTE ESTERASE UR QL STRIP: ABNORMAL
LYMPHOCYTES # BLD AUTO: 2.9 10E9/L (ref 0.8–5.3)
LYMPHOCYTES NFR BLD AUTO: 41.8 %
MCH RBC QN AUTO: 32.6 PG (ref 26.5–33)
MCHC RBC AUTO-ENTMCNC: 32.4 G/DL (ref 31.5–36.5)
MCV RBC AUTO: 101 FL (ref 78–100)
MONOCYTES # BLD AUTO: 0.5 10E9/L (ref 0–1.3)
MONOCYTES NFR BLD AUTO: 7.7 %
NEUTROPHILS # BLD AUTO: 3.2 10E9/L (ref 1.6–8.3)
NEUTROPHILS NFR BLD AUTO: 46.3 %
NITRATE UR QL: NEGATIVE
NON-SQ EPI CELLS #/AREA URNS LPF: ABNORMAL /LPF
PH UR STRIP: 5 PH (ref 5–7)
PLATELET # BLD AUTO: 259 10E9/L (ref 150–450)
POTASSIUM SERPL-SCNC: 4.2 MMOL/L (ref 3.4–5.3)
PROT SERPL-MCNC: 7.6 G/DL (ref 6.8–8.8)
RBC # BLD AUTO: 4.17 10E12/L (ref 3.8–5.2)
RBC #/AREA URNS AUTO: ABNORMAL /HPF
SODIUM SERPL-SCNC: 136 MMOL/L (ref 133–144)
SOURCE: ABNORMAL
SP GR UR STRIP: 1.01 (ref 1–1.03)
TSH SERPL DL<=0.005 MIU/L-ACNC: 0.43 MU/L (ref 0.4–4)
UROBILINOGEN UR STRIP-ACNC: 0.2 EU/DL (ref 0.2–1)
VIT B12 SERPL-MCNC: 367 PG/ML (ref 193–986)
WBC # BLD AUTO: 6.9 10E9/L (ref 4–11)
WBC #/AREA URNS AUTO: ABNORMAL /HPF

## 2020-03-24 PROCEDURE — 81001 URINALYSIS AUTO W/SCOPE: CPT | Performed by: PHYSICIAN ASSISTANT

## 2020-03-24 PROCEDURE — 82607 VITAMIN B-12: CPT | Performed by: PHYSICIAN ASSISTANT

## 2020-03-24 PROCEDURE — 80053 COMPREHEN METABOLIC PANEL: CPT | Performed by: PHYSICIAN ASSISTANT

## 2020-03-24 PROCEDURE — 84443 ASSAY THYROID STIM HORMONE: CPT | Performed by: PHYSICIAN ASSISTANT

## 2020-03-24 PROCEDURE — 85025 COMPLETE CBC W/AUTO DIFF WBC: CPT | Performed by: PHYSICIAN ASSISTANT

## 2020-03-24 PROCEDURE — 36415 COLL VENOUS BLD VENIPUNCTURE: CPT | Performed by: PHYSICIAN ASSISTANT

## 2020-03-24 PROCEDURE — 87086 URINE CULTURE/COLONY COUNT: CPT | Performed by: PHYSICIAN ASSISTANT

## 2020-03-24 NOTE — PROGRESS NOTES
"TELEPHONE ENCOUNTER NOTE    DATE OF VISIT: 3/25/2020  CLINIC LOCATION: [facility]  MRN: [id number]  PATIENT NAME: Ciera Casas  YOB: 1950  PCP: Sowmya Tinajero PA-C    Ciera Casas is a 69 year old female who is being evaluated via a billable telephone visit due to COVID-19 precautions.      The patient has been notified of following:     \"This telephone visit will be conducted via a call between you and your physician/provider. We have found that certain health care needs can be provided without the need for a physical exam.  This service lets us provide the care you need with a short phone conversation.  If a prescription is necessary we can send it directly to your pharmacy.  If lab work is needed we can place an order for that and you can then stop by our lab to have the test done at a later time.    If during the course of the call the physician/provider feels a telephone visit is not appropriate, you will not be charged for this service.\"     Ciera Casas complains of    Chief Complaint   Patient presents with     Follow Up     memory      I have reviewed and updated the patient's Past Medical History, Social History, Family History and Medication List.    ALLERGIES  Oxybutynin    Doroteo Denson MA on 3/25/2020 at 8:59 AM    Additional provider notes:    REASON FOR VISIT:   Chief Complaint   Patient presents with     Follow Up     memory      SUBJECTIVE:                                                      HISTORY OF PRESENT ILLNESS: This telephone encounter is regarding primary care provider's concerns regarding possible worsening of mild cognitive impairment.  The last visit was on 12/10/2019. Please refer to my initial/other prior notes for further information.    Since the last visit, the patient reports that her cognitive problems are stable.  She is not sure if her memory is any worse, though admits that she occasionally might forget to turn the oven off " when she cooks.  She believes that she will not get lost if she walks outside of her house.  Her significant other, Candis, is not available to talk because he is in dialysis today.  When asked about hallucinations, previously reported to primary care provider, the patient states that occasionally she talks to her  mother, but actually denies any visual hallucinations.  Also denies interval development of new focal neurological symptoms.    Screening work-up for reversible causes of cognitive impairment included unremarkable CMP (except slightly elevated glucose of 117), normal TSH (0.43), normal vitamin B12 (367), unremarkable CBC (except MCV of 101), and urinalysis notable for trace leukocyte esterase and a few bacteria.  Urine culture was sent to [facility] infectious disease laboratory.    On review of systems, patient endorses lightheadedness and possibly cough, which is slightly worse compared to yesterday.   ASSESSMENT AND PLAN:                                                    Assessment: 69-year-old female patient, evaluated via a telephone visit for mild cognitive impairment requested by her primary care provider, who is concerned about memory worsening.  We had a detailed discussion with the patient regarding her current symptoms and the plan.  The patient reports that her cognitive dysfunction is stable, and unfortunately her significant other is not available to talk.  Screening labs were unrevealing, except pending urine culture.  I discussed with the patient that after COVID-19 precautions are lifted, I would like to see her in office for MoCA in approximately 6 weeks from now.  We might consider to repeat her neuropsychologic evaluation early, based on results.  We should also discuss formal behind the wheel OT evaluation at that time.    Diagnoses:    ICD-10-CM    1. Mild cognitive impairment  G31.84    2. Cough  R05      Plan:  I advised the patient to contact my clinic with cognitive function  worsening.  Regarding patient's possible cough, I advised her to contact [facility] online versus at [phone] to discuss her symptoms and necessary testing.  Follow-up in 6 weeks or sooner if needed.    I have reviewed the note as documented above.  This accurately captures the substance of my conversation with the patient.    Phone call contact time:  Call Started at 9:16 AM.  Call Ended at 9:28 AM.    Tony Moore MD  HP/[facility] Neurology.

## 2020-03-25 ENCOUNTER — TELEPHONE (OUTPATIENT)
Dept: FAMILY MEDICINE | Facility: CLINIC | Age: 70
End: 2020-03-25

## 2020-03-25 ENCOUNTER — VIRTUAL VISIT (OUTPATIENT)
Dept: NEUROLOGY | Facility: CLINIC | Age: 70
End: 2020-03-25
Payer: COMMERCIAL

## 2020-03-25 DIAGNOSIS — G31.84 MILD COGNITIVE IMPAIRMENT: Primary | ICD-10-CM

## 2020-03-25 DIAGNOSIS — R05.9 COUGH: ICD-10-CM

## 2020-03-25 LAB
BACTERIA SPEC CULT: NORMAL
BACTERIA SPEC CULT: NORMAL
SPECIMEN SOURCE: NORMAL

## 2020-03-25 PROCEDURE — 99212 OFFICE O/P EST SF 10 MIN: CPT | Mod: TEL | Performed by: PSYCHIATRY & NEUROLOGY

## 2020-03-25 NOTE — TELEPHONE ENCOUNTER
"From 3/19/20 visit with Julio Tinajero    \"Patient taking Buspar but wondering if worsening symptoms related to medicine vs underlying cognitive impairment per neurology.     Patient hasn't been taking for the past 2-3 weeks - initially full 10 mg pill made her sleep for 20+ hours; then was taking 5 mg (half tab) maybe once a day but didn't try it to help her sleep and hasn't noticed any improvement or worsening in confusion symptoms throughout the day, especially in the am.\"    Julio, is she to be taking 1/2 dose daily - if so we can send new rx to pharmacy    Nida Munroe, RN, BSN       "

## 2020-03-25 NOTE — TELEPHONE ENCOUNTER
Pt is calling to report that she is only taking 1/2 a pill one time per day of Buspar. Directions state to take 1 pill 3 times a day. Pt wants to know if she should change that. Please assist. Thanks!    Ninfa Campbell  [facility] Patient Rep

## 2020-03-25 NOTE — PATIENT INSTRUCTIONS
Please contact my clinic with cognitive function worsening.  Please call [phone] to discuss your cough.  Follow-up in 6 weeks or sooner if needed.

## 2020-03-26 NOTE — RESULT ENCOUNTER NOTE
"Results discussed with patient at neurology visit.  Within normal limits, no acute changes.  Sowmya \"Julio\" SLAVA Tinajero   "

## 2020-03-26 NOTE — TELEPHONE ENCOUNTER
Patient informed as below.  Had been taking 1/2 a pill a day, so will try using as needed only.  Kimberley Denise RN

## 2020-03-26 NOTE — TELEPHONE ENCOUNTER
"Patient gets/is very confused.. it was unclear if this medicine was helping with anything... try to explain that this can be an as needed anxiety medicine - she can take a half a pill to a whole pill up to three times a day IF/WHEN needed.  See how that goes.    Thanks  Sowmya \"Julio\" SLAVA Tinajero   "

## 2020-03-27 ENCOUNTER — TELEPHONE (OUTPATIENT)
Dept: FAMILY MEDICINE | Facility: CLINIC | Age: 70
End: 2020-03-27

## 2020-03-27 NOTE — TELEPHONE ENCOUNTER
Reason for Call:  Other call back    Detailed comments: pt states confused what meds to take.     Phone Number Patient can be reached at: Home number on file [phone] (home)    Best Time: asap    Can we leave a detailed message on this number? YES    Call taken on 3/27/2020 at 9:31 AM by Karla Delgado

## 2020-03-27 NOTE — TELEPHONE ENCOUNTER
I talked to Candis.  He feels things have worsened in the last 30-45 days.  Pt will wake him at night to see kids on the garage or in the yard. The kids are not there.  Pt sits at a table and talks to her mother.(Mother has been dead for 9 years.)    Ciera doesn't talk about her daughter. Daughter is incarcerated. Daughter may be trying to get released to her mother's house. Candis says this would NOT be a good situation.   Daughter would take advantage of her mother.    Candis did move out of the house 2 days ago. PT is alone. Her son checks on her nightly.   Candis said he can check on her and move back into the house.    I did review recent negative labs.  I also confirmed that pt had telephone neuro appt this week. Candis didn't know about the neuro appt. He was at his own medical appt on 3/25/2020 am. Confirmed next neuro appt in May.    I did say that if hallucinations are worse or he can't calm her down, she should go to the [facility].  FRANCESCO Cruz                Consent to communicate from 2013 doesn't list anyone.  FRANCESCO Cruz

## 2020-03-27 NOTE — TELEPHONE ENCOUNTER
"Can you contact patient's  (Candis)- ask what he's seeing but if these are new/worsening symptoms, she needs to go to ED.  She has been worked up for acute reasons for memory changes, seen by neurology recently reporting stable memory changes, but is very confused and often calls during the day with these types of hallucinations....      Thanks  Sowmya \"Julio\" SLAVA Tinajero   "

## 2020-03-27 NOTE — TELEPHONE ENCOUNTER
"I talked to pt.  She stated,\"I feel psychotic.\"  PT stated she is babysitting 2 kids today. When I asked her more about this. She felt the kids were 3 or  4 years old and were deaf.   Upon further discussion, pt said the kids aren't real. She just sees them. She had been seeing them for quite a while. Other people don't see them.    PT is taking Buspar 1/2 tab daily prn. She says taking it more make her tired.  I did review the TE dated 3/25/2020.    I reviewed all meds- pt says she is not taking mobic or magnesium. I removed from med list.    MP- advice?  FRANCESCO Cruz    "

## 2020-04-16 ENCOUNTER — VIRTUAL VISIT (OUTPATIENT)
Dept: PHYSICAL THERAPY | Facility: CLINIC | Age: 70
End: 2020-04-16
Payer: COMMERCIAL

## 2020-04-16 DIAGNOSIS — M75.122 NONTRAUMATIC COMPLETE TEAR OF LEFT ROTATOR CUFF: Primary | ICD-10-CM

## 2020-04-16 PROCEDURE — 97530 THERAPEUTIC ACTIVITIES: CPT | Mod: 95 | Performed by: PHYSICAL THERAPIST

## 2020-04-16 PROCEDURE — 97110 THERAPEUTIC EXERCISES: CPT | Mod: 95 | Performed by: PHYSICAL THERAPIST

## 2020-04-16 NOTE — PROGRESS NOTES
"Physical Therapy Virtual Follow Up Visit      The patient has been notified of following:     \"This virtual visit will be conducted between you and your provider. We have found that certain health care needs can be provided without the need for physical presence.  This service lets us provide the care you need with a virtual visit.\"    Due to external, as well as internal [facility] management of the COVID-19 Virus, Ciera Casas was not seen in our clinic.  As a substitution, we implemented a virtual visit to manage this patient's condition utilizing the PTRx virtual visit platform via the patient s existing code.  The provider, Joshua Patterson, reviewed the patient's chart, PTRx prescription, and spoke with the patient to determine the following telemedicine visit is appropriate and effective for the patient's care.    The following type of visit was completed:   Telephone Visit:  A telephone visit was used in conjunction with the online PTRx exercise platform.        S: Ciera Casas is a 69 year old female. Connected virtually on the PTRx platform to discuss their condition/progress.  They noted ongoing pain or limitations with most daily movements of the arm. She has not had any follow up with Dr. Simeon yet, and plans to see if he can check in via phone call with her. She will wait until clinic reopens to resume formal physical therapy and wants to work on her revised home program until that time.     Current pain level: 7/10    O:  PTRx Content from today's visit:  Exercise Name: Shoulder Sidelying Abduction - Reps: 15  Exercise Name: Supine AAROM Shoulder Flexion - Reps: 10, Notes: Brief pause/hold at the end of the motion  Exercise Name: Wand Shoulder External Rotation in Neutral - Reps: 10, Notes: Brief pause/hold at the end of the motion  Exercise Name: Standing Passive Shoulder Flexion - Reps: 10, Notes: Brief pause/hold at the end of the motion  Exercise Name: Isometric Shoulder Flexion - Reps: " 10x 5-second holds  Exercise Name: Isometric Shoulder Extension - Reps: 10x 5-second holds  Exercise Name: Shoulder Isometric Internal Rotation - Reps: 10x 5-second holds  Exercise Name: Isometric Shoulder External Rotation - Reps: 10x 5-second holds    A:   Patient is not progressing as expected.    P: Patient will continue with the exercise program assigned on their PTRx code and will add the following measures to manage their pain/condition: She will wait until clinic reopens to resume formal physical therapy and wants to work on her revised home program until that time.       Virtual visit contact time    Time of service began: 10:00 AM  Time of service ended: 10:20 AM  Total Time for set up, visit, and documentation: 25 minutes    Payor: MEDICA / Plan: [employer] / Product Type: HMO /   .  Procedure Code/s   Therapeutic Exercise (25161): 10 minutes  Therapeutic Activities (16436): 10 minutes    I have reviewed the note as documented above.  This accurately captures the substance of my conversation with the patient.  Provider location: [place], [place] (City/State)  Patient location: Home

## 2020-08-05 DIAGNOSIS — F43.0 ACUTE REACTION TO STRESS: ICD-10-CM

## 2020-08-05 RX ORDER — BUSPIRONE HYDROCHLORIDE 10 MG/1
TABLET ORAL
Qty: 90 TABLET | Refills: 1 | Status: SHIPPED | OUTPATIENT
Start: 2020-08-05 | End: 2020-10-23

## 2020-10-09 ENCOUNTER — OFFICE VISIT (OUTPATIENT)
Dept: FAMILY MEDICINE | Facility: CLINIC | Age: 70
End: 2020-10-09
Payer: COMMERCIAL

## 2020-10-09 VITALS
HEART RATE: 68 BPM | HEIGHT: 65 IN | WEIGHT: 175 LBS | DIASTOLIC BLOOD PRESSURE: 71 MMHG | TEMPERATURE: 98 F | OXYGEN SATURATION: 99 % | RESPIRATION RATE: 16 BRPM | BODY MASS INDEX: 29.16 KG/M2 | SYSTOLIC BLOOD PRESSURE: 113 MMHG

## 2020-10-09 DIAGNOSIS — Z23 NEED FOR INFLUENZA VACCINATION: ICD-10-CM

## 2020-10-09 DIAGNOSIS — R21 RASH AND NONSPECIFIC SKIN ERUPTION: Primary | ICD-10-CM

## 2020-10-09 PROCEDURE — G0008 ADMIN INFLUENZA VIRUS VAC: HCPCS | Performed by: NURSE PRACTITIONER

## 2020-10-09 PROCEDURE — 90662 IIV NO PRSV INCREASED AG IM: CPT | Performed by: NURSE PRACTITIONER

## 2020-10-09 PROCEDURE — 99213 OFFICE O/P EST LOW 20 MIN: CPT | Performed by: NURSE PRACTITIONER

## 2020-10-09 RX ORDER — TRIAMCINOLONE ACETONIDE 1 MG/G
CREAM TOPICAL 2 TIMES DAILY
Qty: 10 G | Refills: 0 | Status: SHIPPED | OUTPATIENT
Start: 2020-10-09 | End: 2021-05-13

## 2020-10-09 RX ORDER — DONEPEZIL HYDROCHLORIDE 10 MG/1
10 TABLET, FILM COATED ORAL AT BEDTIME
COMMUNITY
End: 2021-03-24

## 2020-10-09 RX ORDER — MULTIVITAMIN WITH IRON
1 TABLET ORAL DAILY
COMMUNITY

## 2020-10-09 ASSESSMENT — ANXIETY QUESTIONNAIRES
GAD7 TOTAL SCORE: 5
2. NOT BEING ABLE TO STOP OR CONTROL WORRYING: SEVERAL DAYS
IF YOU CHECKED OFF ANY PROBLEMS ON THIS QUESTIONNAIRE, HOW DIFFICULT HAVE THESE PROBLEMS MADE IT FOR YOU TO DO YOUR WORK, TAKE CARE OF THINGS AT HOME, OR GET ALONG WITH OTHER PEOPLE: SOMEWHAT DIFFICULT
5. BEING SO RESTLESS THAT IT IS HARD TO SIT STILL: NOT AT ALL
6. BECOMING EASILY ANNOYED OR IRRITABLE: NOT AT ALL
1. FEELING NERVOUS, ANXIOUS, OR ON EDGE: SEVERAL DAYS
3. WORRYING TOO MUCH ABOUT DIFFERENT THINGS: SEVERAL DAYS
7. FEELING AFRAID AS IF SOMETHING AWFUL MIGHT HAPPEN: SEVERAL DAYS

## 2020-10-09 ASSESSMENT — MIFFLIN-ST. JEOR: SCORE: 1314.67

## 2020-10-09 ASSESSMENT — PAIN SCALES - GENERAL: PAINLEVEL: NO PAIN (0)

## 2020-10-09 ASSESSMENT — PATIENT HEALTH QUESTIONNAIRE - PHQ9
SUM OF ALL RESPONSES TO PHQ QUESTIONS 1-9: 5
5. POOR APPETITE OR OVEREATING: SEVERAL DAYS

## 2020-10-09 NOTE — PROGRESS NOTES
"Ms. Casas is a 70 year old female with history of CAD, depression, and hepatitis who presents for     History of Present Illness:    Rash right thigh X 2 weeks.  No pain, itching.  Rash increased gradually and is now improving. Initially had a raised linear appearance.  Now is red, blotchy. Denies scratching, injury, new soap or cosmetic products.  Thinks she may have had the same rash last year.  Used hydrocortisone cream and antifungal, which were effective.  Tried these again the last two nights and is resolving.      Past Medical History:  Past Medical History:   Diagnosis Date     Abnormal blood sugar      CAD (coronary artery disease)      CARDIOVASCULAR SCREENING; LDL GOAL LESS THAN 160 5/9/2010     Depression with anxiety      Hepatitis, unspecified 4/2004     Major depression      Overweight(278.02)        Active Meds:  Current Outpatient Medications   Medication     aspirin 81 MG tablet     atorvastatin (LIPITOR) 40 MG tablet     busPIRone (BUSPAR) 10 MG tablet     donepezil (ARICEPT) 10 MG tablet     magnesium 250 MG tablet     Cholecalciferol (VITAMIN D3 PO)     fish oil-omega-3 fatty acids 1000 MG capsule     nystatin (MYCOSTATIN) 804192 UNIT/GM external ointment     terbinafine (LAMISIL) 1 % external cream     No current facility-administered medications for this visit.         Allergies:  Reviewed, refer to EMR      A 6-pt Review of Systems was performed, verified and is negative except as documented in the HPI.  All health questionnaires were reviewed, verified and relevant information documented above.    Physical Exam:  Vitals: /71 (BP Location: Right arm, Patient Position: Sitting, Cuff Size: Adult Regular)   Pulse 68   Temp 98  F (36.7  C) (Oral)   Resp 16   Ht 1.651 m (5' 5\")   Wt 79.4 kg (175 lb)   SpO2 99%   BMI 29.12 kg/m    Constitutional: Alert, oriented, pleasant, no acute distress  Skin: Diffuse area of non-raised reddened skin with scattered petichiae and diagonal linear " pattern across the anterior right thigh.  Small area of similar appearing rash on the left thigh.   Psychiatric: normal mentation, affect and mood      Assessment and Plan:    (R21) Rash and nonspecific skin eruption  (primary encounter diagnosis)  Comment:  Has scratched and histamine activation appearance.  Plan:Claritin X1 tablet daily  triamcinolone (KENALOG) 0.1 % external cream        (Z23) Need for influenza vaccination  Plan: FLU VACCINE HIGH DOSE PRESERVATIVE FREE, AGE         =>65 YR           #Routine Health Maintenence:  Immunizations (zoster, pneumovax, flu, Tdap, Hep A/B):   Most Recent Immunizations   Administered Date(s) Administered     FLU 6-35 months 12/11/2013     Influenza (High Dose) 3 valent vaccine 10/16/2019     Influenza (IIV3) PF 03/02/2012     Influenza Vaccine IM > 6 months Valent IIV4 12/11/2013     Pneumo Conj 13-V (2010&after) 10/15/2015     Pneumococcal 23 valent 11/09/2016     TDAP Vaccine (Adacel) 03/02/2012     Td (Adult), Adsorbed 10/25/2004     Tdap (Adult) Unspecified Formulation 10/25/2004     Zoster vaccine, live 03/02/2012     Lipids:   Recent Labs   Lab Test 02/25/19  1437 02/08/18  1149 10/15/15  1007 10/15/15  1007 02/26/14  0920   CHOL 164 163   < > 147 197   HDL 71 84   < > 78 64   LDL 78 65   < > 56 118   TRIG 77 72   < > 66 73   CHOLHDLRATIO  --   --   --  1.9 3.1    < > = values in this interval not displayed.       Return to clinic:   As needed    Tonya Gardner, ANP, AGACNP  Nurse Practioner

## 2020-10-09 NOTE — PATIENT INSTRUCTIONS
Triamcinoline cream 3x/day X 1 week  Claritin X1 daily  If no improvement, first try changing laundry soap to scent-free/dye-free  If still no improvement, please make appointment with dermatology for evaluation      [facility] Medication Refill Request Information:  * Please contact your pharmacy regarding ANY request for medication refills.  ** NP Clinic Prescription Fax = [phone]  * Please allow 3 business days for routine medication refills.  * Please allow 5 business days for controlled substance medication refills.     [facility] Test Result notification information:  *You will be notified with in 7-10 days of your appointment day regarding the results of your test.  If you are on MyChart you will be notified as soon as the provider has reviewed the results and signed off on them.    [facility]: [phone]     ENRIQUE Romero, CNP  Nurse Practitioner

## 2020-10-09 NOTE — NURSING NOTE
"70 year old  Chief Complaint   Patient presents with     Derm Problem     Patient complains of a rash on the right thigh for the past 10 days.        Blood pressure 113/71, pulse 68, temperature 98  F (36.7  C), temperature source Oral, resp. rate 16, height 1.651 m (5' 5\"), weight 79.4 kg (175 lb), SpO2 99 %, not currently breastfeeding. Body mass index is 29.12 kg/m .  BP completed using cuff size:    Patient Active Problem List   Diagnosis     Overweight     Depression with anxiety     Coronary artery disease involving native coronary artery of native heart without angina pectoris     Cervical cancer screening     Cognitive changes     Nontraumatic complete tear of left rotator cuff       Wt Readings from Last 2 Encounters:   10/09/20 79.4 kg (175 lb)   20 76.7 kg (169 lb)     BP Readings from Last 3 Encounters:   10/09/20 113/71   12/10/19 112/72   19 114/80       Allergies   Allergen Reactions     Oxybutynin Difficulty breathing and Shortness Of Breath       Current Outpatient Medications   Medication     aspirin 81 MG tablet     atorvastatin (LIPITOR) 40 MG tablet     busPIRone (BUSPAR) 10 MG tablet     donepezil (ARICEPT) 10 MG tablet     magnesium 250 MG tablet     Cholecalciferol (VITAMIN D3 PO)     fish oil-omega-3 fatty acids 1000 MG capsule     nystatin (MYCOSTATIN) 967294 UNIT/GM external ointment     terbinafine (LAMISIL) 1 % external cream     No current facility-administered medications for this visit.        Social History     Tobacco Use     Smoking status: Former Smoker     Packs/day: 0.50     Years: 30.00     Pack years: 15.00     Types: Cigarettes     Quit date: 2009     Years since quittin.6     Smokeless tobacco: Never Used   Substance Use Topics     Alcohol use: Yes     Comment: a couple of drinks on the weekend     Drug use: No       Honoring Choices - Health Care Directive Guide offered to patient at time of visit.    Health Maintenance Due   Topic Date Due     ZOSTER " IMMUNIZATION (2 of 3) 04/27/2012     ADVANCE CARE PLANNING  11/09/2017     MEDICARE ANNUAL WELLNESS VISIT  02/08/2019     MAMMO SCREENING  11/06/2019     FALL RISK ASSESSMENT  04/18/2020     PHQ-9  06/09/2020     INFLUENZA VACCINE (1) 09/01/2020       Immunization History   Administered Date(s) Administered     FLU 6-35 months 03/02/2012, 12/11/2013     Influenza (High Dose) 3 valent vaccine 10/15/2015, 11/09/2016, 10/30/2017, 11/02/2018, 10/16/2019     Influenza (IIV3) PF 11/03/2003, 11/02/2007, 10/09/2009, 01/14/2010, 03/02/2012     Influenza Vaccine IM > 6 months Valent IIV4 12/11/2013     Pneumo Conj 13-V (2010&after) 10/15/2015     Pneumococcal 23 valent 11/09/2016     TDAP Vaccine (Adacel) 03/02/2012     Td (Adult), Adsorbed 10/25/2004     Tdap (Adult) Unspecified Formulation 10/25/2004     Zoster vaccine, live 03/02/2012       Lab Results   Component Value Date    PAP NIL 10/15/2015         Recent Labs   Lab Test 03/24/20  0900 09/08/19  1201 09/06/19  1151 02/25/19  1437 02/08/18  1149 02/08/18  1149 12/13/16  0909   A1C  --   --  5.3  --   --   --   --    LDL  --   --   --  78  --  65 56   HDL  --   --   --  71  --  84 80   TRIG  --   --   --  77  --  72 53   ALT 31 31  --  34   < > 76*  --    CR 0.80 0.84  --  0.96   < > 0.85  --    GFRESTIMATED 75 71  --  61   < > 67  --    GFRESTBLACK 87 83  --  70   < > 81  --    ALBUMIN 3.8 4.1  --  4.0   < > 4.2  --    POTASSIUM 4.2 3.4  --  4.8   < > 4.6  --    TSH 0.43  --  0.47  --    < > 0.53  --     < > = values in this interval not displayed.       PHQ-2 ( 1999 Pfizer) 12/9/2019 11/15/2019   Q1: Little interest or pleasure in doing things 0 0   Q2: Feeling down, depressed or hopeless 0 0   PHQ-2 Score 0 0       PHQ-9 SCORE 2/25/2019 3/14/2019 10/15/2019 12/9/2019   PHQ-9 Total Score - - - -   PHQ-9 Total Score MyChart - - 3 (Minimal depression) -   PHQ-9 Total Score 12 11 3 2       SAURABH-7 SCORE 3/14/2019 10/15/2019 12/9/2019   Total Score - 9 (mild anxiety) -    Total Score 17 9 2       No flowsheet data found.    Mason Hanson, EMT  October 9, 2020 12:32 PM

## 2020-10-10 ASSESSMENT — ANXIETY QUESTIONNAIRES: GAD7 TOTAL SCORE: 5

## 2020-10-20 DIAGNOSIS — F43.0 ACUTE REACTION TO STRESS: ICD-10-CM

## 2020-10-20 NOTE — LETTER
October 24, 2020      Ciera Casas  [address]  [facility] [address]        Su Vang,      We received a refill request for busPIRone (BUSPAR) 10 MG tablet; however, you are due for an annual physical visit to receive a refill. Please call us at [phone] at your earliest convenience to schedule an appointment.       We greatly appreciate the opportunity to serve you and thank you for trusting us with your health care.        Your health care team at [facility]               Sincerely,        Sowmya Tinajero PA-C

## 2020-10-23 DIAGNOSIS — F43.0 ACUTE REACTION TO STRESS: ICD-10-CM

## 2020-10-23 RX ORDER — BUSPIRONE HYDROCHLORIDE 10 MG/1
TABLET ORAL
Qty: 90 TABLET | Refills: 1 | Status: SHIPPED | OUTPATIENT
Start: 2020-10-23 | End: 2021-01-07

## 2020-10-23 RX ORDER — BUSPIRONE HYDROCHLORIDE 10 MG/1
TABLET ORAL
Qty: 90 TABLET | Refills: 1 | OUTPATIENT
Start: 2020-10-23

## 2020-10-24 NOTE — TELEPHONE ENCOUNTER
LM to call back and schedule annual visit. Pt has no active MyChart. Sent letter.    Aliza ARNETT  [facility]

## 2020-11-13 ENCOUNTER — OFFICE VISIT (OUTPATIENT)
Dept: FAMILY MEDICINE | Facility: CLINIC | Age: 70
End: 2020-11-13
Payer: COMMERCIAL

## 2020-11-13 VITALS
DIASTOLIC BLOOD PRESSURE: 85 MMHG | HEART RATE: 72 BPM | WEIGHT: 158.5 LBS | BODY MASS INDEX: 26.41 KG/M2 | RESPIRATION RATE: 16 BRPM | OXYGEN SATURATION: 98 % | HEIGHT: 65 IN | TEMPERATURE: 98.1 F | SYSTOLIC BLOOD PRESSURE: 151 MMHG

## 2020-11-13 DIAGNOSIS — R68.89 FORGETFULNESS: ICD-10-CM

## 2020-11-13 DIAGNOSIS — I10 BENIGN ESSENTIAL HYPERTENSION: Primary | ICD-10-CM

## 2020-11-13 DIAGNOSIS — I10 BENIGN ESSENTIAL HYPERTENSION: ICD-10-CM

## 2020-11-13 DIAGNOSIS — R21 RASH AND NONSPECIFIC SKIN ERUPTION: ICD-10-CM

## 2020-11-13 DIAGNOSIS — Z29.9 ENCOUNTER FOR PREVENTIVE MEASURE: ICD-10-CM

## 2020-11-13 PROBLEM — J44.9 COPD (CHRONIC OBSTRUCTIVE PULMONARY DISEASE) (H): Status: ACTIVE | Noted: 2020-11-13

## 2020-11-13 LAB
CHOLEST SERPL-MCNC: 152 MG/DL
HDLC SERPL-MCNC: 86 MG/DL
LDLC SERPL CALC-MCNC: 56 MG/DL
NONHDLC SERPL-MCNC: 65 MG/DL
TRIGL SERPL-MCNC: 49 MG/DL

## 2020-11-13 PROCEDURE — 80061 LIPID PANEL: CPT | Performed by: PATHOLOGY

## 2020-11-13 PROCEDURE — 99213 OFFICE O/P EST LOW 20 MIN: CPT | Performed by: NURSE PRACTITIONER

## 2020-11-13 PROCEDURE — 36415 COLL VENOUS BLD VENIPUNCTURE: CPT | Performed by: PATHOLOGY

## 2020-11-13 RX ORDER — LISINOPRIL 5 MG/1
5 TABLET ORAL DAILY
Qty: 60 TABLET | Refills: 0 | Status: SHIPPED | OUTPATIENT
Start: 2020-11-13 | End: 2021-01-05

## 2020-11-13 ASSESSMENT — MIFFLIN-ST. JEOR: SCORE: 1239.83

## 2020-11-13 ASSESSMENT — PAIN SCALES - GENERAL: PAINLEVEL: NO PAIN (0)

## 2020-11-13 NOTE — NURSING NOTE
"70 year old  Chief Complaint   Patient presents with     Recheck Medication     Patient comes in for a follow up and to discuss medications.        Blood pressure (!) 151/85, pulse 72, temperature 98.1  F (36.7  C), temperature source Oral, resp. rate 16, height 1.651 m (5' 5\"), weight 71.9 kg (158 lb 8 oz), SpO2 98 %, not currently breastfeeding. Body mass index is 26.38 kg/m .  BP completed using cuff size:    Patient Active Problem List   Diagnosis     Overweight     Depression with anxiety     Coronary artery disease involving native coronary artery of native heart without angina pectoris     Cervical cancer screening     Cognitive changes     Nontraumatic complete tear of left rotator cuff       Wt Readings from Last 2 Encounters:   20 71.9 kg (158 lb 8 oz)   10/09/20 79.4 kg (175 lb)     BP Readings from Last 3 Encounters:   20 (!) 151/85   10/09/20 113/71   12/10/19 112/72       Allergies   Allergen Reactions     Oxybutynin Difficulty breathing and Shortness Of Breath       Current Outpatient Medications   Medication     aspirin 81 MG tablet     atorvastatin (LIPITOR) 40 MG tablet     busPIRone (BUSPAR) 10 MG tablet     Cholecalciferol (VITAMIN D3 PO)     donepezil (ARICEPT) 10 MG tablet     fish oil-omega-3 fatty acids 1000 MG capsule     magnesium 250 MG tablet     triamcinolone (KENALOG) 0.1 % external cream     nystatin (MYCOSTATIN) 258099 UNIT/GM external ointment     terbinafine (LAMISIL) 1 % external cream     No current facility-administered medications for this visit.        Social History     Tobacco Use     Smoking status: Former Smoker     Packs/day: 0.50     Years: 30.00     Pack years: 15.00     Types: Cigarettes     Quit date: 2009     Years since quittin.7     Smokeless tobacco: Never Used   Substance Use Topics     Alcohol use: Yes     Comment: a couple of drinks on the weekend     Drug use: No       Honoring Choices - Health Care Directive Guide offered to patient at " time of visit.    Health Maintenance Due   Topic Date Due     ZOSTER IMMUNIZATION (2 of 3) 04/27/2012     ADVANCE CARE PLANNING  11/09/2017     MEDICARE ANNUAL WELLNESS VISIT  02/08/2019     MAMMO SCREENING  11/06/2019       Immunization History   Administered Date(s) Administered     FLU 6-35 months 03/02/2012, 12/11/2013     Influenza (High Dose) 3 valent vaccine 10/15/2015, 11/09/2016, 10/30/2017, 11/02/2018, 10/16/2019     Influenza (IIV3) PF 11/03/2003, 11/02/2007, 10/09/2009, 01/14/2010, 03/02/2012     Influenza Vaccine IM > 6 months Valent IIV4 12/11/2013     Influenza, Quad, High Dose, Pf, 65yr + 10/09/2020     Pneumo Conj 13-V (2010&after) 10/15/2015     Pneumococcal 23 valent 11/09/2016     TDAP Vaccine (Adacel) 03/02/2012     Td (Adult), Adsorbed 10/25/2004     Tdap (Adult) Unspecified Formulation 10/25/2004     Zoster vaccine, live 03/02/2012       Lab Results   Component Value Date    PAP NIL 10/15/2015         Recent Labs   Lab Test 03/24/20  0900 09/08/19  1201 09/06/19  1151 02/25/19  1437 02/08/18  1149 02/08/18  1149 12/13/16  0909   A1C  --   --  5.3  --   --   --   --    LDL  --   --   --  78  --  65 56   HDL  --   --   --  71  --  84 80   TRIG  --   --   --  77  --  72 53   ALT 31 31  --  34   < > 76*  --    CR 0.80 0.84  --  0.96   < > 0.85  --    GFRESTIMATED 75 71  --  61   < > 67  --    GFRESTBLACK 87 83  --  70   < > 81  --    ALBUMIN 3.8 4.1  --  4.0   < > 4.2  --    POTASSIUM 4.2 3.4  --  4.8   < > 4.6  --    TSH 0.43  --  0.47  --    < > 0.53  --     < > = values in this interval not displayed.       PHQ-2 ( 1999 Pfizer) 12/9/2019 11/15/2019   Q1: Little interest or pleasure in doing things 0 0   Q2: Feeling down, depressed or hopeless 0 0   PHQ-2 Score 0 0       PHQ-9 SCORE 3/14/2019 10/15/2019 12/9/2019 10/9/2020   PHQ-9 Total Score - - - -   PHQ-9 Total Score MyChart - 3 (Minimal depression) - -   PHQ-9 Total Score 11 3 2 5       SAURABH-7 SCORE 10/15/2019 12/9/2019 10/9/2020   Total  Score 9 (mild anxiety) - -   Total Score 9 2 5       No flowsheet data found.    Mason Hanson, EMT  November 13, 2020 1:01 PM

## 2020-11-13 NOTE — PROGRESS NOTES
"Ms. Casas is a 70 year old female with history of CAD and depression presents for rash follow-up.    History of Present Illness:    Rash right thigh - Has been using triamcinolone cream and rash has resolved.      Sees Dr. Shea, neurology for memory/thinking problems.         Past Medical History:  Past Medical History:   Diagnosis Date     Abnormal blood sugar      CAD (coronary artery disease)      CARDIOVASCULAR SCREENING; LDL GOAL LESS THAN 160 5/9/2010     Depression with anxiety      Hepatitis, unspecified 4/2004     Major depression      Overweight(278.02)        Active Meds:  Current Outpatient Medications   Medication     aspirin 81 MG tablet     atorvastatin (LIPITOR) 40 MG tablet     busPIRone (BUSPAR) 10 MG tablet     Cholecalciferol (VITAMIN D3 PO)     donepezil (ARICEPT) 10 MG tablet     fish oil-omega-3 fatty acids 1000 MG capsule     magnesium 250 MG tablet     triamcinolone (KENALOG) 0.1 % external cream     nystatin (MYCOSTATIN) 265820 UNIT/GM external ointment     terbinafine (LAMISIL) 1 % external cream     No current facility-administered medications for this visit.         Allergies:  Reviewed, refer to EMR      A full 10-pt Review of Systems was performed, verified and is negative except as documented in the HPI.  All health questionnaires were reviewed, verified and relevant information documented above.      Physical Exam:  Vitals: BP (!) 151/85   Pulse 72   Temp 98.1  F (36.7  C) (Oral)   Resp 16   Ht 1.651 m (5' 5\")   Wt 71.9 kg (158 lb 8 oz)   SpO2 98%   BMI 26.38 kg/m      Repeat 152/80    Constitutional: Alert, oriented, pleasant, no acute distress  Head: Normocephalic, atraumatic  Cardiovascular: Regular rate and rhythm, systolic murmur 2/6, peripheral pulses full/symmetric  Respiratory: Good air movement bilaterally, lungs clear, no wheezes/rales/rhonchi  GI: Abdomen soft, bowel sounds present, nondistended, nontender, no organomegaly or masses, no " rebound/guarding  Musculoskeletal: No edema, normal muscle tone, normal gait  Neurologic: Alert and oriented, circular conversation with some memory gaps.  Patient reports losing train of thought and word finding difficulty  Skin: No rashes/lesions  Psychiatric: normal mentation, affect and mood      Assessment and Plan:    (I10) Benign essential hypertension  (primary encounter diagnosis)  Comment: New diagnosis today's visit  Plan: Lipid panel reflex to direct LDL Non-fasting,         lisinopril (ZESTRIL) 5 MG tablet    (R21) Rash and nonspecific skin eruption  Comment: Right thigh rash has cleared  Plan: Monitor    (R68.89) Forgetfulness  Records release for Dr. Shea, neurology  Plan focused cognition screen      #Routine Health Maintenence:  Immunizations (zoster, pneumovax, flu, Tdap, Hep A/B):   Most Recent Immunizations   Administered Date(s) Administered     FLU 6-35 months 12/11/2013     Influenza (High Dose) 3 valent vaccine 10/16/2019     Influenza (IIV3) PF 03/02/2012     Influenza Vaccine IM > 6 months Valent IIV4 12/11/2013     Influenza, Quad, High Dose, Pf, 65yr + 10/09/2020     Pneumo Conj 13-V (2010&after) 10/15/2015     Pneumococcal 23 valent 11/09/2016     TDAP Vaccine (Adacel) 03/02/2012     Td (Adult), Adsorbed 10/25/2004     Tdap (Adult) Unspecified Formulation 10/25/2004     Zoster vaccine, live 03/02/2012     Lipids:   Recent Labs   Lab Test 02/25/19  1437 02/08/18  1149 10/15/15  1007 10/15/15  1007 02/26/14  0920   CHOL 164 163   < > 147 197   HDL 71 84   < > 78 64   LDL 78 65   < > 56 118   TRIG 77 72   < > 66 73   CHOLHDLRATIO  --   --   --  1.9 3.1    < > = values in this interval not displayed.       Return to clinic:  1 month, BP and cognition follow-up    Tonya Gardner, ANP, AGACNP  Nurse Practioner

## 2020-11-13 NOTE — PATIENT INSTRUCTIONS
Take BP at home daily in AM - bring readings with to next appointment  Vit D3 500 units daily  Lisinopril 5 mg/day  Exercise 30 min 5 days/week        [facility] Medication Refill Request Information:  * Please contact your pharmacy regarding ANY request for medication refills.  ** NP Clinic Prescription Fax = [phone]  * Please allow 3 business days for routine medication refills.  * Please allow 5 business days for controlled substance medication refills.     [facility] Test Result notification information:  *You will be notified with in 7-10 days of your appointment day regarding the results of your test.  If you are on MyChart you will be notified as soon as the provider has reviewed the results and signed off on them.    [facility]: [phone]

## 2020-12-18 ENCOUNTER — VIRTUAL VISIT (OUTPATIENT)
Dept: FAMILY MEDICINE | Facility: CLINIC | Age: 70
End: 2020-12-18
Payer: COMMERCIAL

## 2020-12-18 DIAGNOSIS — R68.83 CHILLS (WITHOUT FEVER): ICD-10-CM

## 2020-12-18 DIAGNOSIS — J34.89 RHINORRHEA: ICD-10-CM

## 2020-12-18 DIAGNOSIS — I10 BENIGN ESSENTIAL HYPERTENSION: Primary | ICD-10-CM

## 2020-12-18 PROCEDURE — 99214 OFFICE O/P EST MOD 30 MIN: CPT | Mod: 95 | Performed by: NURSE PRACTITIONER

## 2020-12-18 NOTE — PATIENT INSTRUCTIONS

## 2020-12-18 NOTE — PROGRESS NOTES
Ms. Casas is a 70 year old female with history of hypertension who presents for     History of Present Illness:      Hot and Cold sweats X 2 weeks.  Periodic, they come and go.  Last several minutes to an hour, occurring couple times per day. Positive for Rhinorrhea.    Denies fever, cough, SOB.  No nausea, vomiting, diarrhea. Wears a mask when she goes out.  Saw her brother the day before Thanksgiving. Partner goes in for dialysis treatment.      Hypertension:  Checking BP at home. 141/77 was highest, more typically about 100/70s  Currently 116/74, HR 74.  Denies light headedness, dizziness.    Cognition:  Recently had cognitive testing with Dr. Shea, [facility]   Will need release of records      Past Medical History:  Past Medical History:   Diagnosis Date     Abnormal blood sugar      CAD (coronary artery disease)      CARDIOVASCULAR SCREENING; LDL GOAL LESS THAN 160 5/9/2010     Depression with anxiety      Hepatitis, unspecified 4/2004     Major depression      Overweight(278.02)        Active Meds:  Current Outpatient Medications   Medication     aspirin 81 MG tablet     atorvastatin (LIPITOR) 40 MG tablet     busPIRone (BUSPAR) 10 MG tablet     Cholecalciferol (VITAMIN D3 PO)     donepezil (ARICEPT) 10 MG tablet     fish oil-omega-3 fatty acids 1000 MG capsule     lisinopril (ZESTRIL) 5 MG tablet     magnesium 250 MG tablet     triamcinolone (KENALOG) 0.1 % external cream     nystatin (MYCOSTATIN) 079894 UNIT/GM external ointment     terbinafine (LAMISIL) 1 % external cream     No current facility-administered medications for this visit.         Allergies:  Reviewed, refer to EMR    A 6-pt Review of Systems was performed, verified and is negative except as documented in the HPI.  All health questionnaires were reviewed, verified and relevant information documented above.      Physical Exam:  Vitals: There were no vitals taken for this visit.  Exam limited due to telephone visit  Constitutional: Alert,  oriented, pleasant, no acute distress  Respiratory: Speaking in full sentences.  No audible wheezing.    Psychiatric: normal mentation, affect and mood      Assessment and Plan:    (I10) Benign essential hypertension  (primary encounter diagnosis)  Comment:  Normotensive on low dose lisinopril  Plan: Comprehensive metabolic panel, Vitamin D Level,        Hemoglobin A1c          (R68.83) Chills (without fever)  (J34.89) Rhinorrhea  Plan: Symptomatic COVID-19 Virus (Coronavirus) by PCR          #Routine Health Maintenence:  Immunizations (zoster, pneumovax, flu, Tdap, Hep A/B):   Most Recent Immunizations   Administered Date(s) Administered     FLU 6-35 months 12/11/2013     Influenza (High Dose) 3 valent vaccine 10/16/2019     Influenza (IIV3) PF 03/02/2012     Influenza Vaccine IM > 6 months Valent IIV4 12/11/2013     Influenza, Quad, High Dose, Pf, 65yr + 10/09/2020     Pneumo Conj 13-V (2010&after) 10/15/2015     Pneumococcal 23 valent 11/09/2016     TDAP Vaccine (Adacel) 03/02/2012     Td (Adult), Adsorbed 10/25/2004     Tdap (Adult) Unspecified Formulation 10/25/2004     Zoster vaccine, live 03/02/2012     Lipids:   Recent Labs   Lab Test 11/13/20  1403 02/25/19  1437 10/15/15  1007 10/15/15  1007 02/26/14  0920   CHOL 152 164   < > 147 197   HDL 86 71   < > 78 64   LDL 56 78   < > 56 118   TRIG 49 77   < > 66 73   CHOLHDLRATIO  --   --   --  1.9 3.1    < > = values in this interval not displayed.       Return to clinic:  One month or as needed for new concerns    Options for treatment and follow-up care were reviewed with the patient. She  engaged in the decision making process and verbalized understanding of the options discussed and agreed with the final plan.    Tonya Gardner, ANP, AGACNP  Nurse Practitioner

## 2020-12-18 NOTE — NURSING NOTE
Chief Complaint   Patient presents with     Recheck Medication     Patient calls in for a routine follow up.         Mason Hanson MA on 12/18/2020 at 12:15 PM

## 2020-12-18 NOTE — PROGRESS NOTES
"Ciera Casas is a 70 year old female who is being evaluated via a billable telephone visit.      The patient has been notified of following:     \"This telephone visit will be conducted via a call between you and your physician/provider. We have found that certain health care needs can be provided without the need for a physical exam.  This service lets us provide the care you need with a short phone conversation.  If a prescription is necessary we can send it directly to your pharmacy.  If lab work is needed we can place an order for that and you can then stop by our lab to have the test done at a later time.    Telephone visits are billed at different rates depending on your insurance coverage. During this emergency period, for some insurers they may be billed the same as an in-person visit.  Please reach out to your insurance provider with any questions.    If during the course of the call the physician/provider feels a telephone visit is not appropriate, you will not be charged for this service.\"    Patient has given verbal consent for Telephone visit?  Yes    What phone number would you like to be contacted at? [phone]    How would you like to obtain your AVS? Mail a copy    Phone call duration: 15 minutes    MARIA C Mares, AGACNP  Nurse Practitioner      "

## 2020-12-19 DIAGNOSIS — J34.89 RHINORRHEA: ICD-10-CM

## 2020-12-19 DIAGNOSIS — R68.83 CHILLS (WITHOUT FEVER): ICD-10-CM

## 2020-12-19 PROCEDURE — U0003 INFECTIOUS AGENT DETECTION BY NUCLEIC ACID (DNA OR RNA); SEVERE ACUTE RESPIRATORY SYNDROME CORONAVIRUS 2 (SARS-COV-2) (CORONAVIRUS DISEASE [COVID-19]), AMPLIFIED PROBE TECHNIQUE, MAKING USE OF HIGH THROUGHPUT TECHNOLOGIES AS DESCRIBED BY CMS-2020-01-R: HCPCS | Performed by: NURSE PRACTITIONER

## 2020-12-20 LAB
SARS-COV-2 RNA SPEC QL NAA+PROBE: NOT DETECTED
SPECIMEN SOURCE: NORMAL

## 2020-12-21 NOTE — RESULT ENCOUNTER NOTE
Negative covid-19 PCR.  Patient notified.  Advised Vit C, Vit D3, and Zinc supplementation to help reduce severity of infection should an exposure occur.  Also advised to maintain social distancing, masking, and good hand hygiene.    Tonya Gardner ANP, AGAP  Nurse Practitioner

## 2020-12-29 DIAGNOSIS — F43.0 ACUTE REACTION TO STRESS: ICD-10-CM

## 2020-12-30 DIAGNOSIS — I10 BENIGN ESSENTIAL HYPERTENSION: ICD-10-CM

## 2021-01-05 NOTE — TELEPHONE ENCOUNTER
lisinopril (ZESTRIL) 5 MG tablet  Last Written Prescription Date:  11/13/20  Last Fill Quantity: 60,   # refills: 0  Last Office Visit : 12/18/20 [facility]  Future Office visit: none    Routing refill request to provider for review/approval because: med date  1/12/21

## 2021-01-07 RX ORDER — BUSPIRONE HYDROCHLORIDE 10 MG/1
TABLET ORAL
Qty: 90 TABLET | Refills: 1 | Status: SHIPPED | OUTPATIENT
Start: 2021-01-07 | End: 2021-03-24

## 2021-01-07 RX ORDER — LISINOPRIL 5 MG/1
5 TABLET ORAL DAILY
Qty: 60 TABLET | Refills: 0 | Status: SHIPPED | OUTPATIENT
Start: 2021-01-07 | End: 2021-02-12

## 2021-01-20 ENCOUNTER — TELEPHONE (OUTPATIENT)
Dept: FAMILY MEDICINE | Facility: CLINIC | Age: 71
End: 2021-01-20

## 2021-01-20 NOTE — TELEPHONE ENCOUNTER
Patient Quality Outreach      Summary:    Patient has the following on her problem list/HM: None    Patient is due/failing the following:   Breast Cancer Screening - Mammogram    Type of outreach:    Sent letter.    Questions for provider review:    None                                                                                         Priya BEY RN

## 2021-01-20 NOTE — LETTER
January 20, 2021      Ciera Casas  [address]  [place] [facility] [address]    Hi Ciera,    We care about your health and have reviewed your health plan and are making recommendations based on this review to optimize your health.    You are in particular need of attention regarding:   Breast Cancer Screening    At this time Julio Tinajero asked that we reach out and provide you information to schedule your yearly mammogram.     Screening Mammogram Scheduling:  [facility] [place] [phone]  [facility] [phone]  [facility] [phone]  Central Scheduling for All Locations [phone]    If you are underinsured or uninsured, we recommend you contact [facility].   They offer mammograms on a sliding fee charge.   You can schedule with them at [phone].    Sincerely,  Priya BEY RN

## 2021-02-23 ENCOUNTER — TELEPHONE (OUTPATIENT)
Dept: FAMILY MEDICINE | Facility: CLINIC | Age: 71
End: 2021-02-23

## 2021-02-23 NOTE — TELEPHONE ENCOUNTER
LVM to call clinic back. Not sure what pt is asking for.     Tamara Stout, HUSSAIN 10:32 AM  2/23/2021

## 2021-03-16 ENCOUNTER — TELEPHONE (OUTPATIENT)
Dept: FAMILY MEDICINE | Facility: CLINIC | Age: 71
End: 2021-03-16

## 2021-03-24 DIAGNOSIS — Z13.6 CARDIOVASCULAR SCREENING; LDL GOAL LESS THAN 160: ICD-10-CM

## 2021-03-24 DIAGNOSIS — R41.89 COGNITIVE CHANGES: Primary | ICD-10-CM

## 2021-03-24 RX ORDER — ATORVASTATIN CALCIUM 40 MG/1
TABLET, FILM COATED ORAL
Start: 2021-03-24

## 2021-03-24 RX ORDER — DONEPEZIL HYDROCHLORIDE 10 MG/1
10 TABLET, FILM COATED ORAL AT BEDTIME
Qty: 90 TABLET | Refills: 0 | Status: SHIPPED | OUTPATIENT
Start: 2021-03-24

## 2021-03-24 RX ORDER — ATORVASTATIN CALCIUM 40 MG/1
40 TABLET, FILM COATED ORAL DAILY
Qty: 90 TABLET | Refills: 0 | Status: SHIPPED | OUTPATIENT
Start: 2021-03-24 | End: 2021-03-26

## 2021-03-26 ENCOUNTER — OFFICE VISIT (OUTPATIENT)
Dept: FAMILY MEDICINE | Facility: CLINIC | Age: 71
End: 2021-03-26
Payer: COMMERCIAL

## 2021-03-26 VITALS
OXYGEN SATURATION: 99 % | BODY MASS INDEX: 26.65 KG/M2 | TEMPERATURE: 98 F | HEART RATE: 93 BPM | SYSTOLIC BLOOD PRESSURE: 103 MMHG | WEIGHT: 156.08 LBS | DIASTOLIC BLOOD PRESSURE: 71 MMHG | HEIGHT: 64 IN

## 2021-03-26 DIAGNOSIS — Z13.1 SCREENING FOR DIABETES MELLITUS: ICD-10-CM

## 2021-03-26 DIAGNOSIS — F33.1 MAJOR DEPRESSIVE DISORDER, RECURRENT EPISODE, MODERATE (H): ICD-10-CM

## 2021-03-26 DIAGNOSIS — I10 BENIGN ESSENTIAL HYPERTENSION: ICD-10-CM

## 2021-03-26 DIAGNOSIS — Z12.31 ENCOUNTER FOR SCREENING MAMMOGRAM FOR BREAST CANCER: ICD-10-CM

## 2021-03-26 DIAGNOSIS — F43.0 ACUTE REACTION TO STRESS: ICD-10-CM

## 2021-03-26 DIAGNOSIS — E78.5 HYPERLIPIDEMIA, UNSPECIFIED HYPERLIPIDEMIA TYPE: ICD-10-CM

## 2021-03-26 DIAGNOSIS — R41.89 COGNITIVE CHANGES: ICD-10-CM

## 2021-03-26 PROCEDURE — 99214 OFFICE O/P EST MOD 30 MIN: CPT | Performed by: FAMILY MEDICINE

## 2021-03-26 RX ORDER — ATORVASTATIN CALCIUM 40 MG/1
40 TABLET, FILM COATED ORAL DAILY
Qty: 90 TABLET | Refills: 1 | Status: SHIPPED | OUTPATIENT
Start: 2021-03-26 | End: 2021-07-07

## 2021-03-26 RX ORDER — LISINOPRIL 5 MG/1
5 TABLET ORAL DAILY
Qty: 90 TABLET | Refills: 1 | Status: SHIPPED | OUTPATIENT
Start: 2021-03-26 | End: 2021-09-13

## 2021-03-26 RX ORDER — BUSPIRONE HYDROCHLORIDE 10 MG/1
TABLET ORAL
Qty: 90 TABLET | Refills: 1 | Status: SHIPPED | OUTPATIENT
Start: 2021-03-26 | End: 2021-04-14

## 2021-03-26 ASSESSMENT — MIFFLIN-ST. JEOR: SCORE: 1211.38

## 2021-04-09 ENCOUNTER — ANCILLARY PROCEDURE (OUTPATIENT)
Dept: MAMMOGRAPHY | Facility: CLINIC | Age: 71
End: 2021-04-09
Attending: FAMILY MEDICINE
Payer: COMMERCIAL

## 2021-04-09 DIAGNOSIS — Z12.31 ENCOUNTER FOR SCREENING MAMMOGRAM FOR BREAST CANCER: ICD-10-CM

## 2021-04-09 PROCEDURE — 77067 SCR MAMMO BI INCL CAD: CPT | Mod: GC | Performed by: RADIOLOGY

## 2021-04-20 DIAGNOSIS — F43.0 ACUTE REACTION TO STRESS: ICD-10-CM

## 2021-04-21 RX ORDER — BUSPIRONE HYDROCHLORIDE 10 MG/1
TABLET ORAL
Qty: 270 TABLET | Refills: 1 | Status: SHIPPED | OUTPATIENT
Start: 2021-04-21 | End: 2022-04-04

## 2021-04-22 ENCOUNTER — ANCILLARY PROCEDURE (OUTPATIENT)
Dept: MAMMOGRAPHY | Facility: CLINIC | Age: 71
End: 2021-04-22
Attending: FAMILY MEDICINE
Payer: COMMERCIAL

## 2021-04-22 DIAGNOSIS — R92.8 ABNORMAL FINDING ON BREAST IMAGING: Primary | ICD-10-CM

## 2021-04-22 DIAGNOSIS — R92.8 ABNORMAL MAMMOGRAM OF LEFT BREAST: ICD-10-CM

## 2021-04-22 PROCEDURE — 77065 DX MAMMO INCL CAD UNI: CPT | Performed by: RADIOLOGY

## 2021-04-22 PROCEDURE — G0279 TOMOSYNTHESIS, MAMMO: HCPCS | Performed by: RADIOLOGY

## 2021-04-30 DIAGNOSIS — R92.8 ABNORMAL FINDING ON BREAST IMAGING: ICD-10-CM

## 2021-04-30 LAB
LABORATORY COMMENT REPORT: NORMAL
SARS-COV-2 RNA RESP QL NAA+PROBE: NEGATIVE
SARS-COV-2 RNA RESP QL NAA+PROBE: NORMAL
SPECIMEN SOURCE: NORMAL
SPECIMEN SOURCE: NORMAL

## 2021-04-30 PROCEDURE — U0005 INFEC AGEN DETEC AMPLI PROBE: HCPCS | Performed by: PATHOLOGY

## 2021-04-30 PROCEDURE — U0003 INFECTIOUS AGENT DETECTION BY NUCLEIC ACID (DNA OR RNA); SEVERE ACUTE RESPIRATORY SYNDROME CORONAVIRUS 2 (SARS-COV-2) (CORONAVIRUS DISEASE [COVID-19]), AMPLIFIED PROBE TECHNIQUE, MAKING USE OF HIGH THROUGHPUT TECHNOLOGIES AS DESCRIBED BY CMS-2020-01-R: HCPCS | Performed by: PATHOLOGY

## 2021-05-04 ENCOUNTER — ANCILLARY PROCEDURE (OUTPATIENT)
Dept: MAMMOGRAPHY | Facility: CLINIC | Age: 71
End: 2021-05-04
Attending: FAMILY MEDICINE
Payer: COMMERCIAL

## 2021-05-04 DIAGNOSIS — R92.1 BREAST CALCIFICATIONS: Primary | ICD-10-CM

## 2021-05-04 DIAGNOSIS — R92.8 ABNORMAL MAMMOGRAM OF LEFT BREAST: ICD-10-CM

## 2021-05-04 PROCEDURE — 77065 DX MAMMO INCL CAD UNI: CPT | Mod: LT | Performed by: STUDENT IN AN ORGANIZED HEALTH CARE EDUCATION/TRAINING PROGRAM

## 2021-05-04 PROCEDURE — 88305 TISSUE EXAM BY PATHOLOGIST: CPT | Performed by: PATHOLOGY

## 2021-05-04 PROCEDURE — 19081 BX BREAST 1ST LESION STRTCTC: CPT | Mod: LT | Performed by: STUDENT IN AN ORGANIZED HEALTH CARE EDUCATION/TRAINING PROGRAM

## 2021-05-04 PROCEDURE — 88360 TUMOR IMMUNOHISTOCHEM/MANUAL: CPT | Performed by: PATHOLOGY

## 2021-05-04 RX ORDER — LIDOCAINE HYDROCHLORIDE AND EPINEPHRINE 10; 10 MG/ML; UG/ML
10 INJECTION, SOLUTION INFILTRATION; PERINEURAL ONCE
Status: COMPLETED | OUTPATIENT
Start: 2021-05-04 | End: 2021-05-04

## 2021-05-04 RX ADMIN — LIDOCAINE HYDROCHLORIDE AND EPINEPHRINE 10 ML: 10; 10 INJECTION, SOLUTION INFILTRATION; PERINEURAL at 13:25

## 2021-05-07 ENCOUNTER — TELEPHONE (OUTPATIENT)
Dept: MAMMOGRAPHY | Facility: CLINIC | Age: 71
End: 2021-05-07

## 2021-05-07 DIAGNOSIS — D05.10 DUCTAL CARCINOMA IN SITU (DCIS) OF BREAST: Primary | ICD-10-CM

## 2021-05-07 LAB — COPATH REPORT: NORMAL

## 2021-05-07 NOTE — TELEPHONE ENCOUNTER
Spoke to Ciera about her new finding of Ductal Carcinoma In Situ found during her breast biopsy done earlier this week.  We discussed the Radiologist's recommendation of surgical consultation.  A referral has been placed and someone from their office will reach out to her and help coordinate the appointment.  Ciera verbalized understanding and all questions and concerns were answered at this time.

## 2021-05-08 ENCOUNTER — HEALTH MAINTENANCE LETTER (OUTPATIENT)
Age: 71
End: 2021-05-08

## 2021-05-11 ENCOUNTER — TELEPHONE (OUTPATIENT)
Dept: FAMILY MEDICINE | Facility: CLINIC | Age: 71
End: 2021-05-11

## 2021-05-11 NOTE — TELEPHONE ENCOUNTER
Call received from patient askin. What are breast biopsy results?    Writer reviewed 21 telephone encounter and reviewed with patient biopsy findings and next steps.    Patient verbalized understanding and stated she remembered speaking with nurse, Bree.    Patient stated she thinks she missed a call from someone yesterday.    Per review of 21 Oncology referral, a message was left for patient on 5/10/21.    Writer reviewed scheduling number for Oncology with patient more than once and patient was unable to take down number.  Patient had writer speak with significant other, Candis.  Writer reviewed plan for Oncology scheduling, phone number to schedule with Oncology and offered to transfer call to [phone].    Candis verbalized understanding and in agreement with plan.    Candis's call transferred to Oncology scheduling.    CHARLIE SladeN, RN  [facility]

## 2021-05-13 ENCOUNTER — OFFICE VISIT (OUTPATIENT)
Dept: RADIATION THERAPY | Facility: OUTPATIENT CENTER | Age: 71
End: 2021-05-13
Attending: FAMILY MEDICINE
Payer: COMMERCIAL

## 2021-05-13 VITALS
DIASTOLIC BLOOD PRESSURE: 71 MMHG | OXYGEN SATURATION: 100 % | SYSTOLIC BLOOD PRESSURE: 137 MMHG | HEART RATE: 68 BPM | RESPIRATION RATE: 16 BRPM

## 2021-05-13 DIAGNOSIS — D05.10 DUCTAL CARCINOMA IN SITU (DCIS) OF BREAST: ICD-10-CM

## 2021-05-13 ASSESSMENT — PAIN SCALES - GENERAL: PAINLEVEL: NO PAIN (0)

## 2021-05-13 NOTE — PROGRESS NOTES
"HISTORY OF PRESENT ILLNESS:  Today I had an in-person visit with Ciera Casas to discuss management of her recent diagnosis of DCIS.  Ciera is 70 years old and had a screening mammogram on 04/09/2021, which demonstrated a new area of microcalcifications in her left breast.  She had a diagnostic mammogram on 04/22/2021, which confirmed the small area of microcalcifications.  It looks to me that it measures about 1-1.5 cm.  On 05/04/2021, she underwent a stereotactic needle biopsy, which demonstrated DCIS, grade 2.  It was estrogen receptor positive.  She is now here to talk about treatment options.  She has no breast symptoms.  When she was younger, she had a birthmark of her left breast that was \"burned off\" and has left a chronic bruise.  She has otherwise had no breast problems.    PAST MEDICAL HISTORY:  Heart murmur, hyperlipidemia, hypertension and Alzheimer's, which is pretty well-controlled.    FAMILY HISTORY:  Negative for breast cancer.    PHYSICAL EXAMINATION:  Cognitively, she seems very alert.  She is accompanied by her partner.  Left breast examination does reveal this bruise on her left breast, which she says is secondary to the procedure she had a number of years ago.  She otherwise has no palpable masses, skin changes, nipple changes or axillary adenopathy.  Right breast examination reveals no dominant masses, skin changes, nipple changes or axillary adenopathy.    IMPRESSION:  Small grade 2 DCIS, left breast.    PLAN:  I talked to the patient and her partner today.  I did recommend wire localized lumpectomy.  They asked about a watch and wait approach.  I told them that was reasonable given the size of the tumor, her age and the grade.  I told her about ongoing randomized clinical trials and she would like to not have this removed.  So we are going to plan for a followup left-sided diagnostic mammogram in 6 months.  I will see her after that mammogram.    Total time was 30 minutes, which included " reviewing her imaging, the in-person visit and coordinating her care.

## 2021-05-13 NOTE — LETTER
"    5/13/2021         RE: Ciera Casas  [address]  [facility] [address]        Dear Colleague,    Thank you for referring your patient, Ciera Casas, to [facility]. Please see a copy of my visit note below.    HISTORY OF PRESENT ILLNESS:  Today I had an in-person visit with Ciera Casas to discuss management of her recent diagnosis of DCIS.  Ciera is 70 years old and had a screening mammogram on 04/09/2021, which demonstrated a new area of microcalcifications in her left breast.  She had a diagnostic mammogram on 04/22/2021, which confirmed the small area of microcalcifications.  It looks to me that it measures about 1-1.5 cm.  On 05/04/2021, she underwent a stereotactic needle biopsy, which demonstrated DCIS, grade 2.  It was estrogen receptor positive.  She is now here to talk about treatment options.  She has no breast symptoms.  When she was younger, she had a birthmark of her left breast that was \"burned off\" and has left a chronic bruise.  She has otherwise had no breast problems.    PAST MEDICAL HISTORY:  Heart murmur, hyperlipidemia, hypertension and Alzheimer's, which is pretty well-controlled.    FAMILY HISTORY:  Negative for breast cancer.    PHYSICAL EXAMINATION:  Cognitively, she seems very alert.  She is accompanied by her partner.  Left breast examination does reveal this bruise on her left breast, which she says is secondary to the procedure she had a number of years ago.  She otherwise has no palpable masses, skin changes, nipple changes or axillary adenopathy.  Right breast examination reveals no dominant masses, skin changes, nipple changes or axillary adenopathy.    IMPRESSION:  Small grade 2 DCIS, left breast.    PLAN:  I talked to the patient and her partner today.  I did recommend wire localized lumpectomy.  They asked about a watch and wait approach.  I told them that was reasonable given the size of the tumor, her age and the grade.  I told her about ongoing " "randomized clinical trials and she would like to not have this removed.  So we are going to plan for a followup left-sided diagnostic mammogram in 6 months.  I will see her after that mammogram.    Total time was 30 minutes, which included reviewing her imaging, the in-person visit and coordinating her care.        Oncology Rooming Note    May 13, 2021 11:27 AM   Ciera Casas is a 70 year old female who presents for:    Chief Complaint   Patient presents with     Oncology Clinic Visit     New Surgical Oncology consult with Dr. Johnson- JOSE LUIS L breast     Initial Vitals: There were no vitals taken for this visit. Estimated body mass index is 26.87 kg/m  as calculated from the following:    Height as of 3/26/21: 1.623 m (5' 3.9\").    Weight as of 3/26/21: 70.8 kg (156 lb 1.3 oz). There is no height or weight on file to calculate BSA.  Data Unavailable Comment: Data Unavailable   No LMP recorded. Patient is postmenopausal.  Allergies reviewed: Yes  Medications reviewed: Yes    Medications: Medication refills not needed today.  Pharmacy name entered into [employer]:    [facility] [place], [place] - [facility] [address]  WRITTEN PRESCRIPTION REQUESTED  [facility] [place] [place] - [place], [place] - [address]  [facility] [place], [place] - [address] [facility] [phone]    Clinical concerns: L breast DCIS Dr. Johnson was notified.      Kimberly Colon RN    Again, thank you for allowing me to participate in the care of your patient.      Sincerely,        Harrison Johnson MD    "

## 2021-05-13 NOTE — PROGRESS NOTES
"Oncology Rooming Note    May 13, 2021 11:27 AM   Ciera Casas is a 70 year old female who presents for:    Chief Complaint   Patient presents with     Oncology Clinic Visit     New Surgical Oncology consult with Dr. Johnson- JOSE LUIS L breast     Initial Vitals: There were no vitals taken for this visit. Estimated body mass index is 26.87 kg/m  as calculated from the following:    Height as of 3/26/21: 1.623 m (5' 3.9\").    Weight as of 3/26/21: 70.8 kg (156 lb 1.3 oz). There is no height or weight on file to calculate BSA.  Data Unavailable Comment: Data Unavailable   No LMP recorded. Patient is postmenopausal.  Allergies reviewed: Yes  Medications reviewed: Yes    Medications: Medication refills not needed today.  Pharmacy name entered into EPIC:    [facility] [place], [place] - [facility] [address]  WRITTEN PRESCRIPTION REQUESTED  [facility] - [place], [place] - [address]  [facility] [place], [place] - [address] [facility] [phone]    Clinical concerns: L breast DCIS Dr. Johnson was notified.      Kimberly Colon RN              "

## 2021-05-18 ENCOUNTER — PATIENT OUTREACH (OUTPATIENT)
Dept: ONCOLOGY | Facility: CLINIC | Age: 71
End: 2021-05-18

## 2021-05-18 NOTE — PROGRESS NOTES
Surgical Oncology RN Care Coordination Note:     Returned call to patient and left her a message. Informed her I would try her again tomorrow to discuss her questions.     Shawna Rincon RN, BSN  Care Coordinator

## 2021-05-19 NOTE — PROGRESS NOTES
Surgical Oncology RN Care Coordination Note:     Called and spoke regarding her questions regarding post visit survey. All questions answered and confirmed plan for 6 month follow up and that our office will arrange for visit.      Shawna Rincon RN, BSN  Care Coordinator

## 2021-10-23 ENCOUNTER — HEALTH MAINTENANCE LETTER (OUTPATIENT)
Age: 71
End: 2021-10-23

## 2022-04-01 ENCOUNTER — PATIENT OUTREACH (OUTPATIENT)
Dept: ONCOLOGY | Facility: CLINIC | Age: 72
End: 2022-04-01

## 2022-04-01 NOTE — PROGRESS NOTES
RN CARE COORDINATION    Outgoing Call:  Spoke with daughter, Saumya, regarding plan for overdue mammogram and visit with Dr. Johnson. Saumya explained her mother has dementia and has an upcoming appointment with her PCP. She stated she does not want to overschedule her mother, as this can lead to increased confusion. She would like follow-up scheduled, but in May.                   Plan:    Writer will message scheduling team at the end of April to get Ciera scheduled for her mammogram and visit with Dr. Johnson to review.  Saumya verbalized agreement to plan and confirmed she is point of contact for all appointments.     Encouraged Saumya to call with any additional questions or concerns.           TIMOTHY Lewis, RN  RN Care Coordinator  [facility]

## 2022-04-02 DIAGNOSIS — F43.0 ACUTE REACTION TO STRESS: ICD-10-CM

## 2022-04-04 RX ORDER — BUSPIRONE HYDROCHLORIDE 10 MG/1
TABLET ORAL
Qty: 270 TABLET | Refills: 0 | Status: SHIPPED | OUTPATIENT
Start: 2022-04-04

## 2022-04-04 NOTE — TELEPHONE ENCOUNTER
Patient scheduled for a visit with Dr. Morillo on 4/12/22.    Prescription approved per [facility] Refill Protocol.    CHARLIE SladeN, RN  [facility]

## 2022-04-12 ENCOUNTER — OFFICE VISIT (OUTPATIENT)
Dept: FAMILY MEDICINE | Facility: CLINIC | Age: 72
End: 2022-04-12
Payer: COMMERCIAL

## 2022-04-12 VITALS
BODY MASS INDEX: 23.76 KG/M2 | SYSTOLIC BLOOD PRESSURE: 110 MMHG | DIASTOLIC BLOOD PRESSURE: 68 MMHG | HEART RATE: 84 BPM | TEMPERATURE: 97.2 F | OXYGEN SATURATION: 99 % | WEIGHT: 138 LBS

## 2022-04-12 DIAGNOSIS — F33.1 MAJOR DEPRESSIVE DISORDER, RECURRENT EPISODE, MODERATE (H): Primary | ICD-10-CM

## 2022-04-12 DIAGNOSIS — D05.12 DUCTAL CARCINOMA IN SITU (DCIS) OF LEFT BREAST: ICD-10-CM

## 2022-04-12 DIAGNOSIS — F03.91 DEMENTIA WITH BEHAVIORAL DISTURBANCE, UNSPECIFIED DEMENTIA TYPE: ICD-10-CM

## 2022-04-12 DIAGNOSIS — R45.851 PASSIVE SUICIDAL IDEATIONS: ICD-10-CM

## 2022-04-12 PROCEDURE — 99214 OFFICE O/P EST MOD 30 MIN: CPT | Performed by: FAMILY MEDICINE

## 2022-04-12 NOTE — PROGRESS NOTES
Major depressive disorder  Passive suicidal ideations  Dementia with behavioral disturbance  Daughter is unsure of Ciera insurance and coverage options. Today's visit might be out of pocket. Family including Ciera's  and son are concerned about recent cognitive changes and want for Ciera to reestablish care with neurologist. I discussed with Ciera and her daughter that I saw her for visit March 2021 to establish care. Refill of Aricept was done and Ciera was going to establish care with neurologist. No recent follow up with neurologist (Dr. Cuellar). I gave daughter Dr. Cuellar clinic information and she will schedule follow up to restart medications.   Ciera has been experiencing passive SI, no active thoughts. Family takes care of her 24/7, no periods where she is alone. I did not restart Ciera on medications. I did recommend for Ciera to establish care with psychiatrist once insurance is active. Hospital coordinators are in touch with daughter to help with insurance information.     Ductal carcinoma in situ (DCIS) of left breast  Family is interested in Ciera having mammogram and follow up with Dr. Johnson. I will message RN coordinator (Elsa Layton RN) to reach out to Ciera to help schedule those appointments.     Subjective   Ciera is a 71 year old who presents for the following health issues     HPI     She is here with her daughter.   Per daughter she is stuck more in 1970's.   She is constantly stressed about bills or assets.   She is worried, pacing, restless and irritable.   Sometimes there is no calming her down with what she is thinking in her head.   Sometimes she can not articulate what she is going to say.   She hasn't been taking Aricept.   Daughter and  take care of patient 24 hours.   She does have passive SI, no active thoughts.   Daughter unsure of Ciera insurance and coverage.   No follow up for DCIS.     Review of Systems         Objective    /68 (BP Location: Right arm,  Patient Position: Chair, Cuff Size: Adult Regular)   Pulse 84   Temp 97.2  F (36.2  C) (Temporal)   Wt 62.6 kg (138 lb)   SpO2 99%   BMI 23.76 kg/m    Body mass index is 23.76 kg/m .  Physical Exam

## 2022-04-19 ASSESSMENT — PATIENT HEALTH QUESTIONNAIRE - PHQ9
SUM OF ALL RESPONSES TO PHQ QUESTIONS 1-9: 13
5. POOR APPETITE OR OVEREATING: MORE THAN HALF THE DAYS

## 2022-04-19 ASSESSMENT — ANXIETY QUESTIONNAIRES
1. FEELING NERVOUS, ANXIOUS, OR ON EDGE: NEARLY EVERY DAY
5. BEING SO RESTLESS THAT IT IS HARD TO SIT STILL: SEVERAL DAYS
IF YOU CHECKED OFF ANY PROBLEMS ON THIS QUESTIONNAIRE, HOW DIFFICULT HAVE THESE PROBLEMS MADE IT FOR YOU TO DO YOUR WORK, TAKE CARE OF THINGS AT HOME, OR GET ALONG WITH OTHER PEOPLE: SOMEWHAT DIFFICULT
GAD7 TOTAL SCORE: 17
3. WORRYING TOO MUCH ABOUT DIFFERENT THINGS: NEARLY EVERY DAY
6. BECOMING EASILY ANNOYED OR IRRITABLE: NEARLY EVERY DAY
2. NOT BEING ABLE TO STOP OR CONTROL WORRYING: MORE THAN HALF THE DAYS
7. FEELING AFRAID AS IF SOMETHING AWFUL MIGHT HAPPEN: NEARLY EVERY DAY

## 2022-04-20 ASSESSMENT — ANXIETY QUESTIONNAIRES: GAD7 TOTAL SCORE: 17

## 2022-04-22 ENCOUNTER — PATIENT OUTREACH (OUTPATIENT)
Dept: ONCOLOGY | Facility: CLINIC | Age: 72
End: 2022-04-22

## 2022-06-04 ENCOUNTER — HEALTH MAINTENANCE LETTER (OUTPATIENT)
Age: 72
End: 2022-06-04

## 2022-10-09 ENCOUNTER — HEALTH MAINTENANCE LETTER (OUTPATIENT)
Age: 72
End: 2022-10-09

## 2023-01-01 ENCOUNTER — HEALTH MAINTENANCE LETTER (OUTPATIENT)
Age: 73
End: 2023-01-01